# Patient Record
Sex: FEMALE | Race: WHITE | Employment: OTHER | URBAN - METROPOLITAN AREA
[De-identification: names, ages, dates, MRNs, and addresses within clinical notes are randomized per-mention and may not be internally consistent; named-entity substitution may affect disease eponyms.]

---

## 2018-07-20 ENCOUNTER — HOSPITAL ENCOUNTER (OUTPATIENT)
Age: 83
Setting detail: OBSERVATION
Discharge: HOME | End: 2018-07-21
Attending: EMERGENCY MEDICINE | Admitting: INTERNAL MEDICINE
Payer: MEDICARE

## 2018-07-20 ENCOUNTER — APPOINTMENT (OUTPATIENT)
Dept: GENERAL RADIOLOGY | Age: 83
End: 2018-07-20
Payer: MEDICARE

## 2018-07-20 ENCOUNTER — APPOINTMENT (OUTPATIENT)
Dept: CT IMAGING | Age: 83
End: 2018-07-20
Payer: MEDICARE

## 2018-07-20 DIAGNOSIS — S42.021A: ICD-10-CM

## 2018-07-20 DIAGNOSIS — M25.552 ACUTE HIP PAIN, LEFT: ICD-10-CM

## 2018-07-20 DIAGNOSIS — S09.90XA INJURY OF HEAD, INITIAL ENCOUNTER: ICD-10-CM

## 2018-07-20 DIAGNOSIS — R55 SYNCOPE AND COLLAPSE: Primary | ICD-10-CM

## 2018-07-20 LAB
ANION GAP SERPL CALCULATED.3IONS-SCNC: 14 MMOL/L (ref 7–16)
BASOPHILS ABSOLUTE: 0.06 E9/L (ref 0–0.2)
BASOPHILS RELATIVE PERCENT: 0.6 % (ref 0–2)
BUN BLDV-MCNC: 14 MG/DL (ref 8–23)
CALCIUM SERPL-MCNC: 9.4 MG/DL (ref 8.6–10.2)
CHLORIDE BLD-SCNC: 98 MMOL/L (ref 98–107)
CO2: 24 MMOL/L (ref 22–29)
CREAT SERPL-MCNC: 0.6 MG/DL (ref 0.5–1)
EOSINOPHILS ABSOLUTE: 0.08 E9/L (ref 0.05–0.5)
EOSINOPHILS RELATIVE PERCENT: 0.8 % (ref 0–6)
GFR AFRICAN AMERICAN: >60
GFR NON-AFRICAN AMERICAN: >60 ML/MIN/1.73
GLUCOSE BLD-MCNC: 122 MG/DL (ref 74–109)
HCT VFR BLD CALC: 44.8 % (ref 34–48)
HEMOGLOBIN: 14.9 G/DL (ref 11.5–15.5)
IMMATURE GRANULOCYTES #: 0.03 E9/L
IMMATURE GRANULOCYTES %: 0.3 % (ref 0–5)
LYMPHOCYTES ABSOLUTE: 1.58 E9/L (ref 1.5–4)
LYMPHOCYTES RELATIVE PERCENT: 15.1 % (ref 20–42)
MCH RBC QN AUTO: 29.7 PG (ref 26–35)
MCHC RBC AUTO-ENTMCNC: 33.3 % (ref 32–34.5)
MCV RBC AUTO: 89.4 FL (ref 80–99.9)
MONOCYTES ABSOLUTE: 0.61 E9/L (ref 0.1–0.95)
MONOCYTES RELATIVE PERCENT: 5.8 % (ref 2–12)
NEUTROPHILS ABSOLUTE: 8.13 E9/L (ref 1.8–7.3)
NEUTROPHILS RELATIVE PERCENT: 77.4 % (ref 43–80)
PDW BLD-RTO: 13.5 FL (ref 11.5–15)
PLATELET # BLD: 272 E9/L (ref 130–450)
PMV BLD AUTO: 8.9 FL (ref 7–12)
POTASSIUM SERPL-SCNC: 4.2 MMOL/L (ref 3.5–5)
RBC # BLD: 5.01 E12/L (ref 3.5–5.5)
SODIUM BLD-SCNC: 136 MMOL/L (ref 132–146)
TROPONIN: <0.01 NG/ML (ref 0–0.03)
WBC # BLD: 10.5 E9/L (ref 4.5–11.5)

## 2018-07-20 PROCEDURE — 96376 TX/PRO/DX INJ SAME DRUG ADON: CPT

## 2018-07-20 PROCEDURE — 99285 EMERGENCY DEPT VISIT HI MDM: CPT

## 2018-07-20 PROCEDURE — G0378 HOSPITAL OBSERVATION PER HR: HCPCS

## 2018-07-20 PROCEDURE — 73030 X-RAY EXAM OF SHOULDER: CPT

## 2018-07-20 PROCEDURE — 96374 THER/PROPH/DIAG INJ IV PUSH: CPT

## 2018-07-20 PROCEDURE — 85025 COMPLETE CBC W/AUTO DIFF WBC: CPT

## 2018-07-20 PROCEDURE — 70450 CT HEAD/BRAIN W/O DYE: CPT

## 2018-07-20 PROCEDURE — 84484 ASSAY OF TROPONIN QUANT: CPT

## 2018-07-20 PROCEDURE — 73502 X-RAY EXAM HIP UNI 2-3 VIEWS: CPT

## 2018-07-20 PROCEDURE — 80048 BASIC METABOLIC PNL TOTAL CA: CPT

## 2018-07-20 PROCEDURE — 6360000002 HC RX W HCPCS: Performed by: STUDENT IN AN ORGANIZED HEALTH CARE EDUCATION/TRAINING PROGRAM

## 2018-07-20 RX ORDER — LEVOTHYROXINE SODIUM 0.07 MG/1
75 TABLET ORAL DAILY
COMMUNITY

## 2018-07-20 RX ORDER — MORPHINE SULFATE 2 MG/ML
2 INJECTION, SOLUTION INTRAMUSCULAR; INTRAVENOUS ONCE
Status: COMPLETED | OUTPATIENT
Start: 2018-07-20 | End: 2018-07-20

## 2018-07-20 RX ORDER — HYDROCODONE BITARTRATE AND ACETAMINOPHEN 5; 325 MG/1; MG/1
1 TABLET ORAL 4 TIMES DAILY PRN
Status: ON HOLD | COMMUNITY
End: 2022-03-09 | Stop reason: HOSPADM

## 2018-07-20 RX ADMIN — MORPHINE SULFATE 2 MG: 2 INJECTION, SOLUTION INTRAMUSCULAR; INTRAVENOUS at 21:05

## 2018-07-20 RX ADMIN — MORPHINE SULFATE 2 MG: 2 INJECTION, SOLUTION INTRAMUSCULAR; INTRAVENOUS at 22:39

## 2018-07-20 ASSESSMENT — PAIN DESCRIPTION - LOCATION: LOCATION: NECK;SHOULDER;HIP

## 2018-07-20 ASSESSMENT — ENCOUNTER SYMPTOMS
SINUS PAIN: 0
SINUS PRESSURE: 0
ABDOMINAL PAIN: 0
APNEA: 0
EYE REDNESS: 0
EYE PAIN: 0
BOWEL INCONTINENCE: 0
CHOKING: 0
COUGH: 0
SHORTNESS OF BREATH: 0
BACK PAIN: 0
CHEST TIGHTNESS: 0
PHOTOPHOBIA: 0
ABDOMINAL DISTENTION: 0
FACIAL SWELLING: 0
NAUSEA: 1
VOMITING: 0
SORE THROAT: 0

## 2018-07-20 ASSESSMENT — PAIN DESCRIPTION - FREQUENCY
FREQUENCY: CONTINUOUS
FREQUENCY: CONTINUOUS

## 2018-07-20 ASSESSMENT — PAIN DESCRIPTION - ORIENTATION: ORIENTATION: RIGHT;LEFT

## 2018-07-20 ASSESSMENT — PAIN DESCRIPTION - DESCRIPTORS
DESCRIPTORS: ACHING;CONSTANT;DISCOMFORT;TENDER
DESCRIPTORS: ACHING

## 2018-07-20 ASSESSMENT — PAIN DESCRIPTION - PAIN TYPE
TYPE: ACUTE PAIN
TYPE: ACUTE PAIN

## 2018-07-20 ASSESSMENT — PAIN SCALES - GENERAL
PAINLEVEL_OUTOF10: 2
PAINLEVEL_OUTOF10: 4
PAINLEVEL_OUTOF10: 2
PAINLEVEL_OUTOF10: 3

## 2018-07-20 ASSESSMENT — PAIN DESCRIPTION - ONSET: ONSET: ON-GOING

## 2018-07-20 ASSESSMENT — PAIN DESCRIPTION - PROGRESSION: CLINICAL_PROGRESSION: NOT CHANGED

## 2018-07-21 VITALS
TEMPERATURE: 97.8 F | DIASTOLIC BLOOD PRESSURE: 90 MMHG | RESPIRATION RATE: 18 BRPM | WEIGHT: 145 LBS | BODY MASS INDEX: 25.69 KG/M2 | SYSTOLIC BLOOD PRESSURE: 180 MMHG | OXYGEN SATURATION: 93 % | HEART RATE: 68 BPM | HEIGHT: 63 IN

## 2018-07-21 LAB
T4 TOTAL: 7.1 MCG/DL (ref 4.5–11.7)
TSH SERPL DL<=0.05 MIU/L-ACNC: 2.1 UIU/ML (ref 0.27–4.2)

## 2018-07-21 PROCEDURE — 23500 CLTX CLAVICULAR FX W/O MNPJ: CPT | Performed by: ORTHOPAEDIC SURGERY

## 2018-07-21 PROCEDURE — 6370000000 HC RX 637 (ALT 250 FOR IP): Performed by: INTERNAL MEDICINE

## 2018-07-21 PROCEDURE — 84436 ASSAY OF TOTAL THYROXINE: CPT

## 2018-07-21 PROCEDURE — G0378 HOSPITAL OBSERVATION PER HR: HCPCS

## 2018-07-21 PROCEDURE — 84443 ASSAY THYROID STIM HORMONE: CPT

## 2018-07-21 PROCEDURE — 99202 OFFICE O/P NEW SF 15 MIN: CPT | Performed by: ORTHOPAEDIC SURGERY

## 2018-07-21 PROCEDURE — 96372 THER/PROPH/DIAG INJ SC/IM: CPT

## 2018-07-21 PROCEDURE — 36415 COLL VENOUS BLD VENIPUNCTURE: CPT

## 2018-07-21 PROCEDURE — 6360000002 HC RX W HCPCS: Performed by: INTERNAL MEDICINE

## 2018-07-21 PROCEDURE — 2580000003 HC RX 258: Performed by: INTERNAL MEDICINE

## 2018-07-21 RX ORDER — HYDROCODONE BITARTRATE AND ACETAMINOPHEN 5; 325 MG/1; MG/1
1 TABLET ORAL 4 TIMES DAILY PRN
Status: DISCONTINUED | OUTPATIENT
Start: 2018-07-21 | End: 2018-07-21 | Stop reason: HOSPADM

## 2018-07-21 RX ORDER — SODIUM CHLORIDE 0.9 % (FLUSH) 0.9 %
10 SYRINGE (ML) INJECTION PRN
Status: DISCONTINUED | OUTPATIENT
Start: 2018-07-21 | End: 2018-07-21 | Stop reason: HOSPADM

## 2018-07-21 RX ORDER — CHOLECALCIFEROL (VITAMIN D3) 50 MCG
10000 TABLET ORAL DAILY
Status: DISCONTINUED | OUTPATIENT
Start: 2018-07-21 | End: 2018-07-21 | Stop reason: HOSPADM

## 2018-07-21 RX ORDER — ACETAMINOPHEN 325 MG/1
650 TABLET ORAL EVERY 4 HOURS PRN
Status: DISCONTINUED | OUTPATIENT
Start: 2018-07-21 | End: 2018-07-21 | Stop reason: HOSPADM

## 2018-07-21 RX ORDER — LEVOTHYROXINE SODIUM 0.07 MG/1
75 TABLET ORAL DAILY
Status: DISCONTINUED | OUTPATIENT
Start: 2018-07-21 | End: 2018-07-21 | Stop reason: HOSPADM

## 2018-07-21 RX ORDER — SODIUM CHLORIDE 0.9 % (FLUSH) 0.9 %
10 SYRINGE (ML) INJECTION EVERY 12 HOURS SCHEDULED
Status: DISCONTINUED | OUTPATIENT
Start: 2018-07-21 | End: 2018-07-21 | Stop reason: HOSPADM

## 2018-07-21 RX ADMIN — CHOLECALCIFEROL TAB 50 MCG (2000 UNIT) 10000 UNITS: 50 TAB at 08:38

## 2018-07-21 RX ADMIN — ENOXAPARIN SODIUM 40 MG: 40 INJECTION, SOLUTION INTRAVENOUS; SUBCUTANEOUS at 08:38

## 2018-07-21 RX ADMIN — Medication 10 ML: at 08:38

## 2018-07-21 RX ADMIN — LEVOTHYROXINE SODIUM 75 MCG: 75 TABLET ORAL at 06:45

## 2018-07-21 RX ADMIN — HYDROCODONE BITARTRATE AND ACETAMINOPHEN 1 TABLET: 5; 325 TABLET ORAL at 08:37

## 2018-07-21 RX ADMIN — HYDROCODONE BITARTRATE AND ACETAMINOPHEN 1 TABLET: 5; 325 TABLET ORAL at 03:58

## 2018-07-21 ASSESSMENT — PAIN SCALES - GENERAL
PAINLEVEL_OUTOF10: 0
PAINLEVEL_OUTOF10: 5
PAINLEVEL_OUTOF10: 6
PAINLEVEL_OUTOF10: 2
PAINLEVEL_OUTOF10: 3

## 2018-07-21 ASSESSMENT — PAIN DESCRIPTION - LOCATION: LOCATION: HEAD;SHOULDER

## 2018-07-21 ASSESSMENT — PAIN DESCRIPTION - ONSET: ONSET: ON-GOING

## 2018-07-21 ASSESSMENT — PAIN DESCRIPTION - PROGRESSION: CLINICAL_PROGRESSION: GRADUALLY WORSENING

## 2018-07-21 ASSESSMENT — PAIN DESCRIPTION - FREQUENCY: FREQUENCY: CONTINUOUS

## 2018-07-21 ASSESSMENT — PAIN DESCRIPTION - DESCRIPTORS: DESCRIPTORS: HEADACHE;ACHING;DISCOMFORT;SORE

## 2018-07-21 ASSESSMENT — PAIN DESCRIPTION - PAIN TYPE: TYPE: ACUTE PAIN

## 2018-07-21 ASSESSMENT — PAIN DESCRIPTION - ORIENTATION: ORIENTATION: ANTERIOR;RIGHT

## 2018-07-21 NOTE — ED PROVIDER NOTES
non distended  Musc-Skel:  full range of motion, normal appearing, no deformities/edema/ ecchymosis  Skin:  warm and dry, normal turgor, no rashes   Neurologic: awake and alert, cooperative, Cranial Nerves II-XII grossly intact, motor& sensory grossly intact x4   Psychiatric: orientated x3, answers questions appropriately, judgment & affect grossly appropriate  Heme/ Lymph: no visible adenopathy, ecchymosis, pettichiae    ------------------------- NURSING NOTES AND VITALS REVIEWED ---------------------------  Date / Time Roomed:  7/20/2018  7:07 PM  ED Bed Assignment:  01/01    The nursing notes within the ED encounter and vital signs as below have been reviewed. Patient Vitals for the past 24 hrs:   BP Temp Temp src Pulse Resp SpO2 Height Weight   07/20/18 1804 (!) 145/77 98.2 °F (36.8 °C) Oral 71 16 92 % 5' 3\" (1.6 m) 141 lb (64 kg)       Oxygen Saturation Interpretation: Normal      Assessment and Plan: Will admit for syncope workup and observation      Impression:   1. Syncope and collapse    2. Injury of head, initial encounter    3. Fracture of clavicular shaft, closed, right, closed, initial encounter    4. Acute hip pain, left            I have reviewed the patient's medications, vital signs, and diagnostic studies available to me in the medical record at the time of the patient encounter.           Darby Gleason MD  08/27/18 0528
mmol/L    Glucose 122 (H) 74 - 109 mg/dL    BUN 14 8 - 23 mg/dL    CREATININE 0.6 0.5 - 1.0 mg/dL    GFR Non-African American >60 >=60 mL/min/1.73    GFR African American >60     Calcium 9.4 8.6 - 10.2 mg/dL   Troponin   Result Value Ref Range    Troponin <0.01 0.00 - 0.03 ng/mL   EKG 12 Lead   Result Value Ref Range    Ventricular Rate 73 BPM    Atrial Rate 73 BPM    P-R Interval 186 ms    QRS Duration 82 ms    Q-T Interval 380 ms    QTc Calculation (Bazett) 418 ms    P Axis 72 degrees    R Axis -26 degrees    T Axis 67 degrees       RADIOLOGY:  XR SHOULDER RIGHT (MIN 2 VIEWS)   Final Result   Fracture at the junction of the middle and lateral right clavicle with   minimal offset. Probable atelectasis in the lower right chest.      XR HIP LEFT (2-3 VIEWS)   Final Result   Degenerative changes in the left hip joint in mild to   moderate degree. No acute fractures or dislocations in the left hip. CT HEAD WO CONTRAST   Final Result          EKG Interpretation    Interpreted by emergency department physician    Rhythm: normal sinus   Rate: normal  Axis: normal  Ectopy: none  Conduction: normal  ST Segments: no acute change  T Waves: no acute change  Q Waves: none    Clinical Impression: no acute changes, normal EKG    Biju Noonan      ------------------------- NURSING NOTES AND VITALS REVIEWED ---------------------------  Date / Time Roomed:  7/20/2018  7:07 PM  ED Bed Assignment:  01/01    The nursing notes within the ED encounter and vital signs as below have been reviewed.      Patient Vitals for the past 24 hrs:   BP Temp Temp src Pulse Resp SpO2 Height Weight   07/20/18 1804 (!) 145/77 98.2 °F (36.8 °C) Oral 71 16 92 % 5' 3\" (1.6 m) 141 lb (64 kg)       Oxygen Saturation Interpretation: Normal    ------------------------------------------ PROGRESS NOTES ------------------------------------------  Re-evaluation(s):  Time: 2100  Patients symptoms are improving  Repeat physical examination is not

## 2018-07-21 NOTE — PROGRESS NOTES
[facility] Quality Flow/Interdisciplinary Rounds Progress Note        Quality Flow Rounds held on July 21, 2018    Disciplines Attending:  Bedside Nurse, ,  and Nursing Unit Leadership    Westley Lord was admitted on 7/20/2018  7:07 PM    Anticipated Discharge Date:  Expected Discharge Date: 07/22/18    Disposition:    Wilner Score:  Wilner Scale Score: 20    Readmission Risk              Risk of Unplanned Readmission:        8             Discussed patient goal for the day, patient clinical progression, and barriers to discharge. The following Goal(s) of the Day/Commitment(s) have been identified: discharge today.      Stefanie Lam  July 21, 2018

## 2018-07-21 NOTE — H&P
CHIEF COMPLAINT: fall        HISTORY OF PRESENT ILLNESS:      The patient is a 80 y.o. female patient with sudden onset of vertigo. She had this before. Awoke and when turned head to side she became dizzy. She has no chest pain. No vision issues. No stroke or central symptoms. She fell and broke collarbone. She ambulates fine some slight hip pain    Past Medical History:    Past Medical History:   Diagnosis Date    Thyroid disease        Past Surgical History:    Past Surgical History:   Procedure Laterality Date    HERNIA REPAIR         Medications Prior to Admission:    Prescriptions Prior to Admission: Cholecalciferol (VITAMIN D3) 24918 units CAPS, Take 10,000 Units by mouth daily  levothyroxine (SYNTHROID) 75 MCG tablet, Take 75 mcg by mouth Daily  HYDROcodone-acetaminophen (NORCO) 5-325 MG per tablet, Take 1 tablet by mouth 4 times daily as needed for Pain. .    Allergies:    Patient has no known allergies. Social History:    reports that she has quit smoking. She has never used smokeless tobacco. She reports that she does not drink alcohol or use drugs. Family History:   family history is not on file. REVIEW OF SYSTEMS:  As above in the HPI, otherwise negative    PHYSICAL EXAM:    Vitals:  BP (!) 155/74   Pulse 69   Temp 98.4 °F (36.9 °C) (Oral)   Resp 16   Ht 5' 3\" (1.6 m)   Wt 145 lb (65.8 kg)   SpO2 93%   BMI 25.69 kg/m²     General:  Awake, alert, oriented X 3. Well developed, well nourished, well groomed. No apparent distress. HEENT:  Normocephalic, atraumatic. Pupils equal, round, reactive to light. No scleral icterus. No conjunctival injection. Normal lips, teeth, and gums. No nasal discharge. Neck:  Supple  Heart:  RRR, no murmurs, gallops, rubs  Lungs:  CTA bilaterally, bilat symmetrical expansion, no wheeze, rales, or rhonchi  Abdomen:   Bowel sounds present, soft, nontender, no masses, no organomegaly, no peritoneal signs  Extremities:  No clubbing, cyanosis, or edema  Skin:  Warm and dry, no open lesions or rash  Neuro:  Cranial nerves 2-12 intact, no focal deficits  Breast: deferred  Rectal: deferred  Genitalia:  deferred    LABS:  CBC with Differential:  Lab Results   Component Value Date    WBC 10.5 07/20/2018    RBC 5.01 07/20/2018    HGB 14.9 07/20/2018    HCT 44.8 07/20/2018     07/20/2018    MCV 89.4 07/20/2018    MCH 29.7 07/20/2018    MCHC 33.3 07/20/2018    RDW 13.5 07/20/2018    LYMPHOPCT 15.1 07/20/2018    MONOPCT 5.8 07/20/2018    BASOPCT 0.6 07/20/2018    MONOSABS 0.61 07/20/2018    LYMPHSABS 1.58 07/20/2018    EOSABS 0.08 07/20/2018    BASOSABS 0.06 07/20/2018     CMP:  Lab Results   Component Value Date     07/20/2018    K 4.2 07/20/2018    CL 98 07/20/2018    CO2 24 07/20/2018    BUN 14 07/20/2018    CREATININE 0.6 07/20/2018    GFRAA >60 07/20/2018    LABGLOM >60 07/20/2018    GLUCOSE 122 07/20/2018    CALCIUM 9.4 07/20/2018     PT/INR:  No results found for: PROTIME, INR  @cktotal:3,ckmb:3,ckmbindex:3,troponini:3@  U/A:  No results found for: NITRU, COLORU, PHUR, LABCAST, WBCUA, RBCUA, MUCUS, TRICHOMONAS, YEAST, BACTERIA, CLARITYU, SPECGRAV, UROBILINOGEN, BILIRUBINUR, BLOODU, GLUCOSEU, KETUA, AMORPHOUS       ASSESSMENT:      Active Problems:    Near syncope  Resolved Problems:    * No resolved hospital problems.  *            PLAN:    Fall from benign postional vertigo  Now resolved  Broken clavicle and left hip pain  Ask ortho to address fracture home today    Aguilar Whitney DO  7:14 AM  7/21/2018

## 2018-07-26 LAB
EKG ATRIAL RATE: 73 BPM
EKG P AXIS: 72 DEGREES
EKG P-R INTERVAL: 186 MS
EKG Q-T INTERVAL: 380 MS
EKG QRS DURATION: 82 MS
EKG QTC CALCULATION (BAZETT): 418 MS
EKG R AXIS: -26 DEGREES
EKG T AXIS: 67 DEGREES
EKG VENTRICULAR RATE: 73 BPM

## 2018-08-07 ENCOUNTER — OFFICE VISIT (OUTPATIENT)
Dept: ORTHOPEDIC SURGERY | Age: 83
End: 2018-08-07

## 2018-08-07 VITALS
WEIGHT: 141 LBS | DIASTOLIC BLOOD PRESSURE: 78 MMHG | HEIGHT: 62 IN | HEART RATE: 84 BPM | BODY MASS INDEX: 25.95 KG/M2 | TEMPERATURE: 98.2 F | SYSTOLIC BLOOD PRESSURE: 138 MMHG

## 2018-08-07 DIAGNOSIS — M25.552 ACUTE HIP PAIN, LEFT: ICD-10-CM

## 2018-08-07 DIAGNOSIS — S42.024D CLOSED NONDISPLACED FRACTURE OF SHAFT OF RIGHT CLAVICLE WITH ROUTINE HEALING, SUBSEQUENT ENCOUNTER: Primary | ICD-10-CM

## 2018-08-07 DIAGNOSIS — G89.29 OTHER CHRONIC PAIN: ICD-10-CM

## 2018-08-07 PROCEDURE — 99024 POSTOP FOLLOW-UP VISIT: CPT | Performed by: ORTHOPAEDIC SURGERY

## 2018-09-27 ENCOUNTER — OFFICE VISIT (OUTPATIENT)
Dept: ORTHOPEDIC SURGERY | Age: 83
End: 2018-09-27

## 2018-09-27 VITALS
DIASTOLIC BLOOD PRESSURE: 80 MMHG | WEIGHT: 137 LBS | HEIGHT: 62 IN | BODY MASS INDEX: 25.21 KG/M2 | HEART RATE: 87 BPM | SYSTOLIC BLOOD PRESSURE: 134 MMHG | TEMPERATURE: 97.8 F

## 2018-09-27 DIAGNOSIS — S42.024D CLOSED NONDISPLACED FRACTURE OF SHAFT OF RIGHT CLAVICLE WITH ROUTINE HEALING, SUBSEQUENT ENCOUNTER: Primary | ICD-10-CM

## 2018-09-27 PROCEDURE — 99024 POSTOP FOLLOW-UP VISIT: CPT | Performed by: ORTHOPAEDIC SURGERY

## 2018-09-27 NOTE — PROGRESS NOTES
Enrique Mccloud  Is about 8 weeks after right midshaft clavicle fracture but steady clinical progress does not feel impaired by her right shoulder although she is having some discomfort especially at night, no numbness tingling in the hand or other joint complaints. Allergies; medications; past medical, surgical, family, and social history; and problem list have been reviewed today and updated as indicated in this encounter. Current Outpatient Prescriptions   Medication Sig Dispense Refill    Coenzyme Q10 (COQ10 PO) Take 1 capsule by mouth daily      B Complex Vitamins (VITAMIN B COMPLEX PO) Take 1 capsule by mouth daily      Cholecalciferol (VITAMIN D3) 16285 units CAPS Take 10,000 Units by mouth daily      levothyroxine (SYNTHROID) 75 MCG tablet Take 75 mcg by mouth Daily      HYDROcodone-acetaminophen (NORCO) 5-325 MG per tablet Take 1 tablet by mouth 4 times daily as needed for Pain. .       No current facility-administered medications for this visit. Exam: Right shoulder shows no visible or palpable deformity, the fracture site is palpable with mild tenderness but this appears to be stable with stress testing no crepitus. Glenohumeral joint shows good tolerance for passive and active rotation without pain mild subacromial crepitus. Radiographs: Two-view x-rays right clavicle today show the midshaft clavicle fracture remain well aligned with some early callus formation incomplete as yet. Teresa Meyersing was seen today for clavicle injury. Diagnoses and all orders for this visit:    Closed nondisplaced fracture of shaft of right clavicle with routine healing, subsequent encounter  -     XR Clavicle Right; Future     Patient continue with protected activities And she will follow-up here in 6 weeks for clinical and x-ray exams right clavicle. Return in about 6 weeks (around 11/8/2018) for xray.

## 2018-11-08 ENCOUNTER — OFFICE VISIT (OUTPATIENT)
Dept: ORTHOPEDIC SURGERY | Age: 83
End: 2018-11-08
Payer: MEDICARE

## 2018-11-08 VITALS
HEART RATE: 74 BPM | DIASTOLIC BLOOD PRESSURE: 90 MMHG | HEIGHT: 62 IN | SYSTOLIC BLOOD PRESSURE: 148 MMHG | TEMPERATURE: 96.9 F | WEIGHT: 137 LBS | BODY MASS INDEX: 25.21 KG/M2

## 2018-11-08 DIAGNOSIS — S42.024D CLOSED NONDISPLACED FRACTURE OF SHAFT OF RIGHT CLAVICLE WITH ROUTINE HEALING, SUBSEQUENT ENCOUNTER: Primary | ICD-10-CM

## 2018-11-08 PROCEDURE — 1101F PT FALLS ASSESS-DOCD LE1/YR: CPT | Performed by: ORTHOPAEDIC SURGERY

## 2018-11-08 PROCEDURE — 1090F PRES/ABSN URINE INCON ASSESS: CPT | Performed by: ORTHOPAEDIC SURGERY

## 2018-11-08 PROCEDURE — 99213 OFFICE O/P EST LOW 20 MIN: CPT | Performed by: ORTHOPAEDIC SURGERY

## 2018-11-08 PROCEDURE — G8427 DOCREV CUR MEDS BY ELIG CLIN: HCPCS | Performed by: ORTHOPAEDIC SURGERY

## 2018-11-08 PROCEDURE — 4040F PNEUMOC VAC/ADMIN/RCVD: CPT | Performed by: ORTHOPAEDIC SURGERY

## 2018-11-08 PROCEDURE — 1123F ACP DISCUSS/DSCN MKR DOCD: CPT | Performed by: ORTHOPAEDIC SURGERY

## 2018-11-08 PROCEDURE — G8484 FLU IMMUNIZE NO ADMIN: HCPCS | Performed by: ORTHOPAEDIC SURGERY

## 2018-11-08 PROCEDURE — G8419 CALC BMI OUT NRM PARAM NOF/U: HCPCS | Performed by: ORTHOPAEDIC SURGERY

## 2018-11-08 PROCEDURE — 1036F TOBACCO NON-USER: CPT | Performed by: ORTHOPAEDIC SURGERY

## 2018-11-08 RX ORDER — DEXTROAMPHETAMINE SACCHARATE, AMPHETAMINE ASPARTATE MONOHYDRATE, DEXTROAMPHETAMINE SULFATE AND AMPHETAMINE SULFATE 2.5; 2.5; 2.5; 2.5 MG/1; MG/1; MG/1; MG/1
CAPSULE, EXTENDED RELEASE ORAL
Refills: 0 | COMMUNITY
Start: 2018-09-28

## 2018-11-08 RX ORDER — LEVOTHYROXINE SODIUM 100 MCG
100 TABLET ORAL
Refills: 0 | COMMUNITY
Start: 2018-10-09

## 2018-11-14 ENCOUNTER — EVALUATION (OUTPATIENT)
Dept: PHYSICAL THERAPY | Age: 83
End: 2018-11-14
Payer: MEDICARE

## 2018-11-14 DIAGNOSIS — R55 NEAR SYNCOPE: ICD-10-CM

## 2018-11-14 PROCEDURE — G8984 CARRY CURRENT STATUS: HCPCS | Performed by: PHYSICAL THERAPIST

## 2018-11-14 PROCEDURE — 97110 THERAPEUTIC EXERCISES: CPT | Performed by: PHYSICAL THERAPIST

## 2018-11-14 PROCEDURE — G8985 CARRY GOAL STATUS: HCPCS | Performed by: PHYSICAL THERAPIST

## 2018-11-14 PROCEDURE — 97140 MANUAL THERAPY 1/> REGIONS: CPT | Performed by: PHYSICAL THERAPIST

## 2018-11-14 PROCEDURE — 97535 SELF CARE MNGMENT TRAINING: CPT | Performed by: PHYSICAL THERAPIST

## 2018-11-14 PROCEDURE — 97161 PT EVAL LOW COMPLEX 20 MIN: CPT | Performed by: PHYSICAL THERAPIST

## 2018-11-14 NOTE — PROGRESS NOTES
INITIAL EVALUATION:               Date:  2018   Patient: Lorie Jones  : 1931  MRN: [id number]  Physician: Rachel Mendez MD  [address] [place] [address]       Medical Diagnosis:   Y76.823G (ICD-10-CM) - Closed nondisplaced fracture of shaft of right clavicle with routine healing, subsequent encounter       Treatment Diagnosis: M25.611    Reason for referral/Mechanism of Injury: Patient states she became ill in the evening. She got up to walk and \"blacked out\" and fell. She then felt a cracking sensation. Onset date: 18     SUBJECTIVE:       Past Medical History:   Diagnosis Date    Thyroid disease      Past Surgical History:   Procedure Laterality Date    HERNIA REPAIR         Pain:  Current 0/10 Best  0/10  Worst 7/10    Symptom Type/Quality/Location/Behavior:  stiffness, intermittent pain R shoulder    Current ADL/Functional Status: Modified independence dressing, grooming. She is using her can in her R arm now. Modified independence sweeping/vacuuming. She is having a difficulty carrying things in the house. Unable to reach into cupboards for items and uses LUE right now. Unable to     Pre-morbid ADL/Functional Status:  Lives alone, ambulates with a SC. She normally is able to use RUE to reachin into cupboards, do housework independently. Occupation:Retired    ; Status:    Medical Management for Current Problem:  [] Chiro  []  CT:  []  Injection:   [x]  Meds: no longer taking anything for her shoulder  []  MRI:  [x]  Ortho  []  PCP  []  PT/OT  [x]  X-ray:  []  Surgery:  [x]  Other: heating pad at night. Outcome Measure: PT G-Codes  Functional Assessment Tool Used: QDASH  Score: 45%  Functional Limitation: Carrying, moving and handling objects  Carrying, Moving and Handling Objects Current Status (): At least 40 percent but less than 60 percent impaired, limited or restricted  Carrying, Moving and Handling Objects Goal Status ():  At least 20 exercise program    Method of Education: [x] Verbal  [x] Demo  [x] Written  Comprehension of Education:  [x] Verbalizes understanding. [] Demonstrates understanding. [x] Needs Review. [] Demonstrates/verbalizes understanding of HEP/Ed previously given. Frequency  3 x/week for 9 visits    Patient understands diagnosis/prognosis and consents to treatment, plan and goals: [x] Yes    [] No         Electronically signed by: Irma Miller, [address] Medicare Patients Only     Please sign Physician's Certification and return to: [address] [place] [address]  Dept: [phone]  Dept Fax: [phone] Certification / Comments     Frequency/Duration 3 / week for 9 visits. Certification period From: 18  To: 19    I have reviewed the Plan of Care established for skilled therapy services and certify that the services are required and that they will be provided while the patient is under my care.     Physician's Comments/Revisions:               Physician's Printed Name:                                           [de-identified] Signature:                                                               Date:

## 2018-11-19 ENCOUNTER — TREATMENT (OUTPATIENT)
Dept: PHYSICAL THERAPY | Age: 83
End: 2018-11-19
Payer: MEDICARE

## 2018-11-19 DIAGNOSIS — M25.611 DECREASED RANGE OF MOTION OF RIGHT SHOULDER: ICD-10-CM

## 2018-11-19 DIAGNOSIS — S42.024D: Primary | ICD-10-CM

## 2018-11-19 PROCEDURE — 97140 MANUAL THERAPY 1/> REGIONS: CPT | Performed by: PHYSICAL THERAPIST

## 2018-11-19 PROCEDURE — 97110 THERAPEUTIC EXERCISES: CPT | Performed by: PHYSICAL THERAPIST

## 2018-11-26 ENCOUNTER — TREATMENT (OUTPATIENT)
Dept: PHYSICAL THERAPY | Age: 83
End: 2018-11-26
Payer: MEDICARE

## 2018-11-26 DIAGNOSIS — S42.024D: Primary | ICD-10-CM

## 2018-11-26 DIAGNOSIS — M25.611 DECREASED RANGE OF MOTION OF RIGHT SHOULDER: ICD-10-CM

## 2018-11-26 DIAGNOSIS — R55 NEAR SYNCOPE: ICD-10-CM

## 2018-11-26 PROCEDURE — 97140 MANUAL THERAPY 1/> REGIONS: CPT

## 2018-11-26 PROCEDURE — 97110 THERAPEUTIC EXERCISES: CPT

## 2018-11-26 PROCEDURE — 97150 GROUP THERAPEUTIC PROCEDURES: CPT

## 2018-11-30 ENCOUNTER — TREATMENT (OUTPATIENT)
Dept: PHYSICAL THERAPY | Age: 83
End: 2018-11-30
Payer: MEDICARE

## 2018-11-30 DIAGNOSIS — M25.611 DECREASED RANGE OF MOTION OF RIGHT SHOULDER: ICD-10-CM

## 2018-11-30 DIAGNOSIS — R55 NEAR SYNCOPE: ICD-10-CM

## 2018-11-30 DIAGNOSIS — S42.024D: Primary | ICD-10-CM

## 2018-11-30 PROCEDURE — G8985 CARRY GOAL STATUS: HCPCS | Performed by: PHYSICAL THERAPIST

## 2018-11-30 PROCEDURE — 97140 MANUAL THERAPY 1/> REGIONS: CPT

## 2018-11-30 PROCEDURE — G8986 CARRY D/C STATUS: HCPCS | Performed by: PHYSICAL THERAPIST

## 2018-11-30 PROCEDURE — 97110 THERAPEUTIC EXERCISES: CPT

## 2019-09-15 ENCOUNTER — HOSPITAL ENCOUNTER (EMERGENCY)
Age: 84
Discharge: HOME | End: 2019-09-15
Attending: EMERGENCY MEDICINE
Payer: MEDICARE

## 2019-09-15 VITALS
HEIGHT: 61 IN | RESPIRATION RATE: 14 BRPM | HEART RATE: 82 BPM | SYSTOLIC BLOOD PRESSURE: 156 MMHG | TEMPERATURE: 98 F | BODY MASS INDEX: 26.24 KG/M2 | OXYGEN SATURATION: 93 % | WEIGHT: 139 LBS | DIASTOLIC BLOOD PRESSURE: 99 MMHG

## 2019-09-15 DIAGNOSIS — R53.83 FATIGUE, UNSPECIFIED TYPE: ICD-10-CM

## 2019-09-15 DIAGNOSIS — I10 ESSENTIAL HYPERTENSION: Primary | ICD-10-CM

## 2019-09-15 LAB
ALBUMIN SERPL-MCNC: 4.3 G/DL (ref 3.5–5.2)
ALP BLD-CCNC: 67 U/L (ref 35–104)
ALT SERPL-CCNC: 12 U/L (ref 0–32)
ANION GAP SERPL CALCULATED.3IONS-SCNC: 11 MMOL/L (ref 7–16)
AST SERPL-CCNC: 14 U/L (ref 0–31)
BACTERIA: ABNORMAL /HPF
BASOPHILS ABSOLUTE: 0.06 E9/L (ref 0–0.2)
BASOPHILS RELATIVE PERCENT: 0.8 % (ref 0–2)
BILIRUB SERPL-MCNC: 0.4 MG/DL (ref 0–1.2)
BILIRUBIN DIRECT: <0.2 MG/DL (ref 0–0.3)
BILIRUBIN URINE: NEGATIVE
BILIRUBIN, INDIRECT: NORMAL MG/DL (ref 0–1)
BLOOD, URINE: NEGATIVE
BUN BLDV-MCNC: 10 MG/DL (ref 8–23)
CALCIUM SERPL-MCNC: 9.1 MG/DL (ref 8.6–10.2)
CHLORIDE BLD-SCNC: 105 MMOL/L (ref 98–107)
CLARITY: CLEAR
CO2: 25 MMOL/L (ref 22–29)
COLOR: YELLOW
CREAT SERPL-MCNC: 0.6 MG/DL (ref 0.5–1)
EOSINOPHILS ABSOLUTE: 0.1 E9/L (ref 0.05–0.5)
EOSINOPHILS RELATIVE PERCENT: 1.4 % (ref 0–6)
GFR AFRICAN AMERICAN: >60
GFR NON-AFRICAN AMERICAN: >60 ML/MIN/1.73
GLUCOSE BLD-MCNC: 106 MG/DL (ref 74–99)
GLUCOSE URINE: NEGATIVE MG/DL
HCT VFR BLD CALC: 44.3 % (ref 34–48)
HEMOGLOBIN: 14.6 G/DL (ref 11.5–15.5)
IMMATURE GRANULOCYTES #: 0.03 E9/L
IMMATURE GRANULOCYTES %: 0.4 % (ref 0–5)
KETONES, URINE: NEGATIVE MG/DL
LEUKOCYTE ESTERASE, URINE: ABNORMAL
LIPASE: 24 U/L (ref 13–60)
LYMPHOCYTES ABSOLUTE: 1.27 E9/L (ref 1.5–4)
LYMPHOCYTES RELATIVE PERCENT: 17.7 % (ref 20–42)
MCH RBC QN AUTO: 29.4 PG (ref 26–35)
MCHC RBC AUTO-ENTMCNC: 33 % (ref 32–34.5)
MCV RBC AUTO: 89.1 FL (ref 80–99.9)
MONOCYTES ABSOLUTE: 0.55 E9/L (ref 0.1–0.95)
MONOCYTES RELATIVE PERCENT: 7.7 % (ref 2–12)
NEUTROPHILS ABSOLUTE: 5.17 E9/L (ref 1.8–7.3)
NEUTROPHILS RELATIVE PERCENT: 72 % (ref 43–80)
NITRITE, URINE: NEGATIVE
PDW BLD-RTO: 14.4 FL (ref 11.5–15)
PH UA: 7 (ref 5–9)
PLATELET # BLD: 257 E9/L (ref 130–450)
PMV BLD AUTO: 9 FL (ref 7–12)
POTASSIUM REFLEX MAGNESIUM: 3.8 MMOL/L (ref 3.5–5)
PROTEIN UA: NEGATIVE MG/DL
RBC # BLD: 4.97 E12/L (ref 3.5–5.5)
RBC UA: ABNORMAL /HPF (ref 0–2)
REASON FOR REJECTION: NORMAL
REJECTED TEST: NORMAL
SODIUM BLD-SCNC: 141 MMOL/L (ref 132–146)
SPECIFIC GRAVITY UA: 1.01 (ref 1–1.03)
TOTAL PROTEIN: 7.2 G/DL (ref 6.4–8.3)
UROBILINOGEN, URINE: 0.2 E.U./DL
WBC # BLD: 7.2 E9/L (ref 4.5–11.5)
WBC UA: ABNORMAL /HPF (ref 0–5)

## 2019-09-15 PROCEDURE — 36415 COLL VENOUS BLD VENIPUNCTURE: CPT

## 2019-09-15 PROCEDURE — 93005 ELECTROCARDIOGRAM TRACING: CPT | Performed by: EMERGENCY MEDICINE

## 2019-09-15 PROCEDURE — 85025 COMPLETE CBC W/AUTO DIFF WBC: CPT

## 2019-09-15 PROCEDURE — 80076 HEPATIC FUNCTION PANEL: CPT

## 2019-09-15 PROCEDURE — 99284 EMERGENCY DEPT VISIT MOD MDM: CPT

## 2019-09-15 PROCEDURE — 80048 BASIC METABOLIC PNL TOTAL CA: CPT

## 2019-09-15 PROCEDURE — 2580000003 HC RX 258: Performed by: EMERGENCY MEDICINE

## 2019-09-15 PROCEDURE — 83690 ASSAY OF LIPASE: CPT

## 2019-09-15 PROCEDURE — 81001 URINALYSIS AUTO W/SCOPE: CPT

## 2019-09-15 PROCEDURE — 6370000000 HC RX 637 (ALT 250 FOR IP): Performed by: EMERGENCY MEDICINE

## 2019-09-15 RX ORDER — ACETAMINOPHEN 500 MG
1000 TABLET ORAL ONCE
Status: COMPLETED | OUTPATIENT
Start: 2019-09-15 | End: 2019-09-15

## 2019-09-15 RX ORDER — 0.9 % SODIUM CHLORIDE 0.9 %
500 INTRAVENOUS SOLUTION INTRAVENOUS ONCE
Status: COMPLETED | OUTPATIENT
Start: 2019-09-15 | End: 2019-09-15

## 2019-09-15 RX ADMIN — ACETAMINOPHEN 1000 MG: 500 TABLET ORAL at 13:02

## 2019-09-15 RX ADMIN — SODIUM CHLORIDE 500 ML: 9 INJECTION, SOLUTION INTRAVENOUS at 13:05

## 2019-09-15 ASSESSMENT — ENCOUNTER SYMPTOMS
SHORTNESS OF BREATH: 0
DIARRHEA: 0
SORE THROAT: 0
ABDOMINAL PAIN: 0
TROUBLE SWALLOWING: 0
NAUSEA: 0
PHOTOPHOBIA: 0
WHEEZING: 0
COUGH: 0
VOMITING: 0
BLOOD IN STOOL: 0
ABDOMINAL DISTENTION: 0
SINUS PAIN: 0

## 2019-09-15 ASSESSMENT — PAIN DESCRIPTION - DESCRIPTORS
DESCRIPTORS: HEADACHE
DESCRIPTORS: HEADACHE

## 2019-09-15 ASSESSMENT — PAIN SCALES - GENERAL
PAINLEVEL_OUTOF10: 4
PAINLEVEL_OUTOF10: 2
PAINLEVEL_OUTOF10: 4

## 2019-09-15 ASSESSMENT — PAIN DESCRIPTION - LOCATION
LOCATION: HEAD
LOCATION: HEAD

## 2019-09-15 ASSESSMENT — PAIN DESCRIPTION - PAIN TYPE
TYPE: ACUTE PAIN
TYPE: ACUTE PAIN

## 2019-09-15 ASSESSMENT — PAIN DESCRIPTION - PROGRESSION: CLINICAL_PROGRESSION: GRADUALLY IMPROVING

## 2019-09-15 ASSESSMENT — PAIN DESCRIPTION - FREQUENCY
FREQUENCY: CONTINUOUS
FREQUENCY: CONTINUOUS

## 2019-09-16 LAB
EKG ATRIAL RATE: 69 BPM
EKG P AXIS: 65 DEGREES
EKG P-R INTERVAL: 192 MS
EKG Q-T INTERVAL: 386 MS
EKG QRS DURATION: 72 MS
EKG QTC CALCULATION (BAZETT): 413 MS
EKG R AXIS: -27 DEGREES
EKG T AXIS: 55 DEGREES
EKG VENTRICULAR RATE: 69 BPM

## 2019-09-16 PROCEDURE — 93010 ELECTROCARDIOGRAM REPORT: CPT | Performed by: INTERNAL MEDICINE

## 2019-12-08 ENCOUNTER — APPOINTMENT (OUTPATIENT)
Dept: NUCLEAR MEDICINE | Age: 84
End: 2019-12-08
Payer: MEDICARE

## 2019-12-08 ENCOUNTER — HOSPITAL ENCOUNTER (OUTPATIENT)
Age: 84
Setting detail: OBSERVATION
Discharge: HOME | End: 2019-12-08
Attending: EMERGENCY MEDICINE | Admitting: INTERNAL MEDICINE
Payer: MEDICARE

## 2019-12-08 ENCOUNTER — APPOINTMENT (OUTPATIENT)
Dept: GENERAL RADIOLOGY | Age: 84
End: 2019-12-08
Payer: MEDICARE

## 2019-12-08 VITALS
OXYGEN SATURATION: 93 % | WEIGHT: 137 LBS | RESPIRATION RATE: 18 BRPM | SYSTOLIC BLOOD PRESSURE: 131 MMHG | TEMPERATURE: 97.9 F | HEART RATE: 72 BPM | HEIGHT: 62 IN | BODY MASS INDEX: 25.21 KG/M2 | DIASTOLIC BLOOD PRESSURE: 61 MMHG

## 2019-12-08 DIAGNOSIS — R11.0 NAUSEA: ICD-10-CM

## 2019-12-08 DIAGNOSIS — R07.9 CHEST PAIN, UNSPECIFIED TYPE: Primary | ICD-10-CM

## 2019-12-08 PROBLEM — E78.5 HYPERLIPIDEMIA: Status: ACTIVE | Noted: 2019-12-08

## 2019-12-08 PROBLEM — I10 HYPERTENSION: Status: ACTIVE | Noted: 2019-12-08

## 2019-12-08 LAB
ALBUMIN SERPL-MCNC: 4 G/DL (ref 3.5–5.2)
ALP BLD-CCNC: 66 U/L (ref 35–104)
ALT SERPL-CCNC: 13 U/L (ref 0–32)
ANION GAP SERPL CALCULATED.3IONS-SCNC: 10 MMOL/L (ref 7–16)
AST SERPL-CCNC: 21 U/L (ref 0–31)
BACTERIA: ABNORMAL /HPF
BASOPHILS ABSOLUTE: 0.04 E9/L (ref 0–0.2)
BASOPHILS RELATIVE PERCENT: 0.6 % (ref 0–2)
BILIRUB SERPL-MCNC: 0.4 MG/DL (ref 0–1.2)
BILIRUBIN URINE: NEGATIVE
BLOOD, URINE: NEGATIVE
BUN BLDV-MCNC: 13 MG/DL (ref 8–23)
CALCIUM SERPL-MCNC: 9.1 MG/DL (ref 8.6–10.2)
CHLORIDE BLD-SCNC: 99 MMOL/L (ref 98–107)
CLARITY: CLEAR
CO2: 26 MMOL/L (ref 22–29)
COLOR: YELLOW
CREAT SERPL-MCNC: 0.6 MG/DL (ref 0.5–1)
EKG ATRIAL RATE: 71 BPM
EKG P AXIS: 89 DEGREES
EKG P-R INTERVAL: 190 MS
EKG Q-T INTERVAL: 374 MS
EKG QRS DURATION: 76 MS
EKG QTC CALCULATION (BAZETT): 406 MS
EKG R AXIS: 36 DEGREES
EKG T AXIS: 71 DEGREES
EKG VENTRICULAR RATE: 71 BPM
EOSINOPHILS ABSOLUTE: 0.1 E9/L (ref 0.05–0.5)
EOSINOPHILS RELATIVE PERCENT: 1.5 % (ref 0–6)
EPITHELIAL CELLS, UA: ABNORMAL /HPF
GFR AFRICAN AMERICAN: >60
GFR NON-AFRICAN AMERICAN: >60 ML/MIN/1.73
GLUCOSE BLD-MCNC: 119 MG/DL (ref 74–99)
GLUCOSE URINE: NEGATIVE MG/DL
HCT VFR BLD CALC: 42.4 % (ref 34–48)
HEMOGLOBIN: 13.4 G/DL (ref 11.5–15.5)
IMMATURE GRANULOCYTES #: 0.02 E9/L
IMMATURE GRANULOCYTES %: 0.3 % (ref 0–5)
KETONES, URINE: NEGATIVE MG/DL
LACTIC ACID: 1 MMOL/L (ref 0.5–2.2)
LEUKOCYTE ESTERASE, URINE: ABNORMAL
LIPASE: 29 U/L (ref 13–60)
LV EF: 82 %
LVEF MODALITY: NORMAL
LYMPHOCYTES ABSOLUTE: 1.28 E9/L (ref 1.5–4)
LYMPHOCYTES RELATIVE PERCENT: 18.7 % (ref 20–42)
MCH RBC QN AUTO: 28.6 PG (ref 26–35)
MCHC RBC AUTO-ENTMCNC: 31.6 % (ref 32–34.5)
MCV RBC AUTO: 90.4 FL (ref 80–99.9)
MONOCYTES ABSOLUTE: 0.51 E9/L (ref 0.1–0.95)
MONOCYTES RELATIVE PERCENT: 7.4 % (ref 2–12)
NEUTROPHILS ABSOLUTE: 4.9 E9/L (ref 1.8–7.3)
NEUTROPHILS RELATIVE PERCENT: 71.5 % (ref 43–80)
NITRITE, URINE: NEGATIVE
PDW BLD-RTO: 13.2 FL (ref 11.5–15)
PH UA: 5.5 (ref 5–9)
PLATELET # BLD: 258 E9/L (ref 130–450)
PMV BLD AUTO: 8.9 FL (ref 7–12)
POTASSIUM REFLEX MAGNESIUM: 4.4 MMOL/L (ref 3.5–5)
PROTEIN UA: NEGATIVE MG/DL
RBC # BLD: 4.69 E12/L (ref 3.5–5.5)
RBC UA: ABNORMAL /HPF (ref 0–2)
SODIUM BLD-SCNC: 135 MMOL/L (ref 132–146)
SPECIFIC GRAVITY UA: <=1.005 (ref 1–1.03)
TOTAL PROTEIN: 7 G/DL (ref 6.4–8.3)
TROPONIN: <0.01 NG/ML (ref 0–0.03)
TROPONIN: <0.01 NG/ML (ref 0–0.03)
UROBILINOGEN, URINE: 0.2 E.U./DL
WBC # BLD: 6.9 E9/L (ref 4.5–11.5)
WBC UA: ABNORMAL /HPF (ref 0–5)

## 2019-12-08 PROCEDURE — 84484 ASSAY OF TROPONIN QUANT: CPT

## 2019-12-08 PROCEDURE — 93016 CV STRESS TEST SUPVJ ONLY: CPT | Performed by: INTERNAL MEDICINE

## 2019-12-08 PROCEDURE — 81001 URINALYSIS AUTO W/SCOPE: CPT

## 2019-12-08 PROCEDURE — G0378 HOSPITAL OBSERVATION PER HR: HCPCS

## 2019-12-08 PROCEDURE — 93018 CV STRESS TEST I&R ONLY: CPT | Performed by: INTERNAL MEDICINE

## 2019-12-08 PROCEDURE — A9500 TC99M SESTAMIBI: HCPCS | Performed by: RADIOLOGY

## 2019-12-08 PROCEDURE — 93005 ELECTROCARDIOGRAM TRACING: CPT | Performed by: STUDENT IN AN ORGANIZED HEALTH CARE EDUCATION/TRAINING PROGRAM

## 2019-12-08 PROCEDURE — 93010 ELECTROCARDIOGRAM REPORT: CPT | Performed by: INTERNAL MEDICINE

## 2019-12-08 PROCEDURE — 80053 COMPREHEN METABOLIC PANEL: CPT

## 2019-12-08 PROCEDURE — 6360000002 HC RX W HCPCS: Performed by: STUDENT IN AN ORGANIZED HEALTH CARE EDUCATION/TRAINING PROGRAM

## 2019-12-08 PROCEDURE — 3430000000 HC RX DIAGNOSTIC RADIOPHARMACEUTICAL: Performed by: RADIOLOGY

## 2019-12-08 PROCEDURE — 96374 THER/PROPH/DIAG INJ IV PUSH: CPT

## 2019-12-08 PROCEDURE — 6370000000 HC RX 637 (ALT 250 FOR IP): Performed by: INTERNAL MEDICINE

## 2019-12-08 PROCEDURE — 99284 EMERGENCY DEPT VISIT MOD MDM: CPT

## 2019-12-08 PROCEDURE — 93017 CV STRESS TEST TRACING ONLY: CPT

## 2019-12-08 PROCEDURE — 71046 X-RAY EXAM CHEST 2 VIEWS: CPT

## 2019-12-08 PROCEDURE — 36415 COLL VENOUS BLD VENIPUNCTURE: CPT

## 2019-12-08 PROCEDURE — 83605 ASSAY OF LACTIC ACID: CPT

## 2019-12-08 PROCEDURE — 83690 ASSAY OF LIPASE: CPT

## 2019-12-08 PROCEDURE — 78452 HT MUSCLE IMAGE SPECT MULT: CPT

## 2019-12-08 PROCEDURE — 85025 COMPLETE CBC W/AUTO DIFF WBC: CPT

## 2019-12-08 RX ORDER — LEVOTHYROXINE SODIUM 0.1 MG/1
100 TABLET ORAL
Status: DISCONTINUED | OUTPATIENT
Start: 2019-12-14 | End: 2019-12-08 | Stop reason: HOSPADM

## 2019-12-08 RX ORDER — HYDROCODONE BITARTRATE AND ACETAMINOPHEN 5; 325 MG/1; MG/1
1 TABLET ORAL EVERY 6 HOURS PRN
Status: DISCONTINUED | OUTPATIENT
Start: 2019-12-08 | End: 2019-12-08 | Stop reason: HOSPADM

## 2019-12-08 RX ORDER — PANTOPRAZOLE SODIUM 40 MG/1
40 TABLET, DELAYED RELEASE ORAL
Qty: 30 TABLET | Refills: 0 | Status: SHIPPED | OUTPATIENT
Start: 2019-12-08

## 2019-12-08 RX ORDER — ASPIRIN 81 MG/1
81 TABLET, CHEWABLE ORAL DAILY
Status: DISCONTINUED | OUTPATIENT
Start: 2019-12-09 | End: 2019-12-08 | Stop reason: HOSPADM

## 2019-12-08 RX ORDER — ACETAMINOPHEN 325 MG/1
650 TABLET ORAL EVERY 4 HOURS PRN
Status: DISCONTINUED | OUTPATIENT
Start: 2019-12-08 | End: 2019-12-08 | Stop reason: HOSPADM

## 2019-12-08 RX ORDER — ONDANSETRON 2 MG/ML
4 INJECTION INTRAMUSCULAR; INTRAVENOUS EVERY 6 HOURS PRN
Status: DISCONTINUED | OUTPATIENT
Start: 2019-12-08 | End: 2019-12-08 | Stop reason: HOSPADM

## 2019-12-08 RX ORDER — ATORVASTATIN CALCIUM 40 MG/1
40 TABLET, FILM COATED ORAL NIGHTLY
Status: DISCONTINUED | OUTPATIENT
Start: 2019-12-08 | End: 2019-12-08 | Stop reason: HOSPADM

## 2019-12-08 RX ORDER — LEVOTHYROXINE SODIUM 0.03 MG/1
75 TABLET ORAL
Status: DISCONTINUED | OUTPATIENT
Start: 2019-12-09 | End: 2019-12-08 | Stop reason: HOSPADM

## 2019-12-08 RX ORDER — SODIUM CHLORIDE 0.9 % (FLUSH) 0.9 %
10 SYRINGE (ML) INJECTION EVERY 12 HOURS SCHEDULED
Status: DISCONTINUED | OUTPATIENT
Start: 2019-12-08 | End: 2019-12-08 | Stop reason: HOSPADM

## 2019-12-08 RX ORDER — ONDANSETRON 2 MG/ML
4 INJECTION INTRAMUSCULAR; INTRAVENOUS ONCE
Status: COMPLETED | OUTPATIENT
Start: 2019-12-08 | End: 2019-12-08

## 2019-12-08 RX ORDER — SODIUM CHLORIDE 0.9 % (FLUSH) 0.9 %
10 SYRINGE (ML) INJECTION PRN
Status: DISCONTINUED | OUTPATIENT
Start: 2019-12-08 | End: 2019-12-08 | Stop reason: HOSPADM

## 2019-12-08 RX ORDER — VALSARTAN 160 MG/1
160 TABLET ORAL DAILY
COMMUNITY

## 2019-12-08 RX ADMIN — ONDANSETRON 4 MG: 2 INJECTION INTRAMUSCULAR; INTRAVENOUS at 07:53

## 2019-12-08 RX ADMIN — Medication 10 MILLICURIE: at 12:05

## 2019-12-08 RX ADMIN — ACETAMINOPHEN 650 MG: 325 TABLET, FILM COATED ORAL at 14:01

## 2019-12-08 RX ADMIN — Medication 35 MILLICURIE: at 13:40

## 2019-12-08 ASSESSMENT — ENCOUNTER SYMPTOMS
PHOTOPHOBIA: 0
WHEEZING: 0
ABDOMINAL PAIN: 1
STRIDOR: 0
TROUBLE SWALLOWING: 0
SHORTNESS OF BREATH: 0
NAUSEA: 1
BACK PAIN: 0
SORE THROAT: 0
COUGH: 0

## 2019-12-08 ASSESSMENT — PAIN SCALES - GENERAL
PAINLEVEL_OUTOF10: 9
PAINLEVEL_OUTOF10: 0

## 2020-11-12 ENCOUNTER — APPOINTMENT (OUTPATIENT)
Dept: GENERAL RADIOLOGY | Age: 85
End: 2020-11-12
Payer: MEDICARE

## 2020-11-12 ENCOUNTER — HOSPITAL ENCOUNTER (EMERGENCY)
Age: 85
Discharge: HOME | End: 2020-11-12
Attending: EMERGENCY MEDICINE
Payer: MEDICARE

## 2020-11-12 ENCOUNTER — APPOINTMENT (OUTPATIENT)
Dept: CT IMAGING | Age: 85
End: 2020-11-12
Payer: MEDICARE

## 2020-11-12 VITALS
DIASTOLIC BLOOD PRESSURE: 69 MMHG | RESPIRATION RATE: 14 BRPM | WEIGHT: 130 LBS | HEART RATE: 80 BPM | BODY MASS INDEX: 23.92 KG/M2 | HEIGHT: 62 IN | SYSTOLIC BLOOD PRESSURE: 158 MMHG | TEMPERATURE: 98.3 F | OXYGEN SATURATION: 95 %

## 2020-11-12 LAB
ALBUMIN SERPL-MCNC: 4 G/DL (ref 3.5–5.2)
ALP BLD-CCNC: 54 U/L (ref 35–104)
ALT SERPL-CCNC: 13 U/L (ref 0–32)
ANION GAP SERPL CALCULATED.3IONS-SCNC: 11 MMOL/L (ref 7–16)
AST SERPL-CCNC: 30 U/L (ref 0–31)
BASOPHILS ABSOLUTE: 0.05 E9/L (ref 0–0.2)
BASOPHILS RELATIVE PERCENT: 0.7 % (ref 0–2)
BILIRUB SERPL-MCNC: 0.4 MG/DL (ref 0–1.2)
BILIRUBIN URINE: ABNORMAL
BLOOD, URINE: NEGATIVE
BUN BLDV-MCNC: 16 MG/DL (ref 8–23)
CALCIUM SERPL-MCNC: 9 MG/DL (ref 8.6–10.2)
CHLORIDE BLD-SCNC: 100 MMOL/L (ref 98–107)
CLARITY: CLEAR
CO2: 25 MMOL/L (ref 22–29)
COLOR: YELLOW
CREAT SERPL-MCNC: 0.6 MG/DL (ref 0.5–1)
EKG ATRIAL RATE: 65 BPM
EKG P AXIS: 25 DEGREES
EKG P-R INTERVAL: 166 MS
EKG Q-T INTERVAL: 404 MS
EKG QRS DURATION: 76 MS
EKG QTC CALCULATION (BAZETT): 420 MS
EKG R AXIS: -14 DEGREES
EKG T AXIS: 60 DEGREES
EKG VENTRICULAR RATE: 65 BPM
EOSINOPHILS ABSOLUTE: 0.05 E9/L (ref 0.05–0.5)
EOSINOPHILS RELATIVE PERCENT: 0.7 % (ref 0–6)
GFR AFRICAN AMERICAN: >60
GFR NON-AFRICAN AMERICAN: >60 ML/MIN/1.73
GLUCOSE BLD-MCNC: 81 MG/DL (ref 74–99)
GLUCOSE URINE: NEGATIVE MG/DL
HCT VFR BLD CALC: 41.1 % (ref 34–48)
HEMOGLOBIN: 13 G/DL (ref 11.5–15.5)
IMMATURE GRANULOCYTES #: 0.03 E9/L
IMMATURE GRANULOCYTES %: 0.4 % (ref 0–5)
KETONES, URINE: 40 MG/DL
LEUKOCYTE ESTERASE, URINE: NEGATIVE
LYMPHOCYTES ABSOLUTE: 1.03 E9/L (ref 1.5–4)
LYMPHOCYTES RELATIVE PERCENT: 14.6 % (ref 20–42)
MCH RBC QN AUTO: 28.8 PG (ref 26–35)
MCHC RBC AUTO-ENTMCNC: 31.6 % (ref 32–34.5)
MCV RBC AUTO: 91.1 FL (ref 80–99.9)
MONOCYTES ABSOLUTE: 0.46 E9/L (ref 0.1–0.95)
MONOCYTES RELATIVE PERCENT: 6.5 % (ref 2–12)
NEUTROPHILS ABSOLUTE: 5.43 E9/L (ref 1.8–7.3)
NEUTROPHILS RELATIVE PERCENT: 77.1 % (ref 43–80)
NITRITE, URINE: NEGATIVE
PDW BLD-RTO: 13.3 FL (ref 11.5–15)
PH UA: 5.5 (ref 5–9)
PLATELET # BLD: 264 E9/L (ref 130–450)
PMV BLD AUTO: 8.9 FL (ref 7–12)
POTASSIUM REFLEX MAGNESIUM: 5.3 MMOL/L (ref 3.5–5)
PROTEIN UA: NEGATIVE MG/DL
RBC # BLD: 4.51 E12/L (ref 3.5–5.5)
SARS-COV-2, NAAT: NOT DETECTED
SODIUM BLD-SCNC: 136 MMOL/L (ref 132–146)
SPECIFIC GRAVITY UA: 1.02 (ref 1–1.03)
TOTAL PROTEIN: 7.1 G/DL (ref 6.4–8.3)
TROPONIN: <0.01 NG/ML (ref 0–0.03)
UROBILINOGEN, URINE: 0.2 E.U./DL
WBC # BLD: 7.1 E9/L (ref 4.5–11.5)

## 2020-11-12 PROCEDURE — 71045 X-RAY EXAM CHEST 1 VIEW: CPT

## 2020-11-12 PROCEDURE — 71260 CT THORAX DX C+: CPT

## 2020-11-12 PROCEDURE — 93010 ELECTROCARDIOGRAM REPORT: CPT | Performed by: INTERNAL MEDICINE

## 2020-11-12 PROCEDURE — 93005 ELECTROCARDIOGRAM TRACING: CPT | Performed by: STUDENT IN AN ORGANIZED HEALTH CARE EDUCATION/TRAINING PROGRAM

## 2020-11-12 PROCEDURE — U0002 COVID-19 LAB TEST NON-CDC: HCPCS

## 2020-11-12 PROCEDURE — 6360000004 HC RX CONTRAST MEDICATION: Performed by: RADIOLOGY

## 2020-11-12 PROCEDURE — 99284 EMERGENCY DEPT VISIT MOD MDM: CPT

## 2020-11-12 PROCEDURE — 80053 COMPREHEN METABOLIC PANEL: CPT

## 2020-11-12 PROCEDURE — 84484 ASSAY OF TROPONIN QUANT: CPT

## 2020-11-12 PROCEDURE — 85025 COMPLETE CBC W/AUTO DIFF WBC: CPT

## 2020-11-12 PROCEDURE — 81003 URINALYSIS AUTO W/O SCOPE: CPT

## 2020-11-12 PROCEDURE — 70450 CT HEAD/BRAIN W/O DYE: CPT

## 2020-11-12 RX ORDER — ALBUTEROL SULFATE 90 UG/1
2 AEROSOL, METERED RESPIRATORY (INHALATION) 4 TIMES DAILY PRN
Qty: 1 INHALER | Refills: 0 | Status: SHIPPED | OUTPATIENT
Start: 2020-11-12

## 2020-11-12 RX ORDER — DOXYCYCLINE HYCLATE 100 MG
100 TABLET ORAL 2 TIMES DAILY
Qty: 14 TABLET | Refills: 0 | Status: SHIPPED | OUTPATIENT
Start: 2020-11-12 | End: 2020-11-19

## 2020-11-12 RX ADMIN — IOPAMIDOL 75 ML: 755 INJECTION, SOLUTION INTRAVENOUS at 11:27

## 2020-11-12 ASSESSMENT — ENCOUNTER SYMPTOMS
TROUBLE SWALLOWING: 0
EYE PAIN: 0
COUGH: 0
PHOTOPHOBIA: 0
DIARRHEA: 0
CONSTIPATION: 0
SORE THROAT: 0
ABDOMINAL DISTENTION: 0
SHORTNESS OF BREATH: 0
ABDOMINAL PAIN: 0
VOMITING: 0
NAUSEA: 0

## 2020-11-12 ASSESSMENT — PAIN SCALES - GENERAL: PAINLEVEL_OUTOF10: 6

## 2020-11-12 ASSESSMENT — PAIN DESCRIPTION - LOCATION: LOCATION: HEAD

## 2020-11-12 ASSESSMENT — PAIN DESCRIPTION - PAIN TYPE: TYPE: ACUTE PAIN

## 2020-11-12 ASSESSMENT — PAIN DESCRIPTION - DESCRIPTORS: DESCRIPTORS: THROBBING

## 2020-11-12 NOTE — ED NOTES
Pt ambulated without difficulty, SpO2 95% during ambulation, resident notified     Nikki Zamorano RN  11/12/20 22 [phone]

## 2020-11-12 NOTE — ED PROVIDER NOTES
Patient is a 22-year-old female with a past medical history of hypothyroidism, hypertension, hyperlipidemia, arthritis who presented to the ED via ambulance for generalized weakness. She reports it has been going on for the past week. She had recently seen her primary physician who had switched her antihypertensive medication and is currently being treated for thrush. She reports feeling of upset stomach with the new hypertensive medication and has not been taking it. Vitals were within normal limit for the ambulance personnel except blood pressure 190/95. Reports having headache and suppressed appetite. Patient denies any visual disturbances, chest pain, shortness of breath, abdominal pain, dysuria, hematuria, diarrhea, lower leg swelling, or dizziness. Review of Systems   Constitutional: Negative for activity change, appetite change, chills, fatigue and fever. HENT: Negative for congestion, sore throat and trouble swallowing. Eyes: Negative for photophobia, pain and visual disturbance. Respiratory: Negative for cough and shortness of breath. Cardiovascular: Negative for chest pain, palpitations and leg swelling. Gastrointestinal: Negative for abdominal distention, abdominal pain, constipation, diarrhea, nausea and vomiting. Endocrine: Negative for heat intolerance and polydipsia. Genitourinary: Negative for difficulty urinating, dysuria, frequency and hematuria. Musculoskeletal: Negative for joint swelling, neck pain and neck stiffness. Skin: Negative for rash and wound. Neurological: Negative for dizziness, syncope, weakness, light-headedness, numbness and headaches. Psychiatric/Behavioral: Negative for agitation, behavioral problems and confusion. The patient is not nervous/anxious. Physical Exam  Constitutional:       General: She is not in acute distress. Appearance: Normal appearance. She is not ill-appearing.    HENT:      Head: Normocephalic and were unremarkable. Troponin negative. Urinalysis showed small bilirubin and ketones but otherwise unremarkable. COVID-19 negative. CT head negative for any acute intracranial abnormality. Atrophy and periventricular leukomalacia. Chest x-ray showed right lung base infiltrate. CT chest with contrast showed Small infiltrate in the right lower lobe. Significant chronic elevation of right hemidiaphragm favoring adjacent atelectasis. Large hiatal hernia. She ambulated on room air at 97% with no difficulty. Patient will be sent home on antibiotic for 7 weeks and Albuterol inhaler. Vies patient to take Tylenol for headache. Likely secondary to uncontrolled blood pressure. Patient resistant to taking BP medications at home. Will need to follow-up with PCP outpatient.                     --------------------------------------------- PAST HISTORY ---------------------------------------------  Past Medical History:  has a past medical history of Hyperlipidemia, Hypertension, Osteoporosis, Spinal stenosis, and Thyroid disease. Past Surgical History:  has a past surgical history that includes hernia repair. Social History:  reports that she has quit smoking. She has never used smokeless tobacco. She reports that she does not drink alcohol or use drugs. Family History: family history is not on file. The patients home medications have been reviewed. Allergies: Patient has no known allergies.     -------------------------------------------------- RESULTS -------------------------------------------------  Labs:  Results for orders placed or performed during the hospital encounter of 11/12/20   CBC Auto Differential   Result Value Ref Range    WBC 7.1 4.5 - 11.5 E9/L    RBC 4.51 3.50 - 5.50 E12/L    Hemoglobin 13.0 11.5 - 15.5 g/dL    Hematocrit 41.1 34.0 - 48.0 %    MCV 91.1 80.0 - 99.9 fL    MCH 28.8 26.0 - 35.0 pg    MCHC 31.6 (L) 32.0 - 34.5 %    RDW 13.3 11.5 - 15.0 fL    Platelets 078 587 - 667 QTc Calculation (Bazett) 420 ms    P Axis 25 degrees    R Axis -14 degrees    T Axis 60 degrees       Radiology:  CT CHEST W CONTRAST   Final Result   1. Small infiltrate seen within the right lower lobe best appreciated on the   sagittal images. 2. Significant chronic elevation of the right hemidiaphragm. There is   adjacent alveolar consolidation which could represent either an infiltrate or   adjacent compression atelectasis. I would favor adjacent compression   atelectasis. 3. Large hiatal hernia possibly a paraesophageal or Bochdalek hernia. There   is minimal adjacent atelectasis seen within the medial left lung base. CT Head WO Contrast   Final Result   1. There is no acute intracranial abnormality. Specifically, there is no   intracranial hemorrhage. 2. Atrophy and periventricular leukomalacia,         XR CHEST PORTABLE   Final Result   Right lung base infiltrate      Chronic elevation right hemidiaphragm      Large hiatal hernia             ------------------------- NURSING NOTES AND VITALS REVIEWED ---------------------------  Date / Time Roomed:  11/12/2020  7:53 AM  ED Bed Assignment:  15/15    The nursing notes within the ED encounter and vital signs as below have been reviewed. BP (!) 158/69   Pulse 80   Temp 98.3 °F (36.8 °C) (Oral)   Resp 14   Ht 5' 2\" (1.575 m)   Wt 130 lb (59 kg)   SpO2 95%   BMI 23.78 kg/m²   Oxygen Saturation Interpretation: Normal      ------------------------------------------ PROGRESS NOTES ------------------------------------------  12:59 PM EST  I have spoken with the patient and discussed todays results, in addition to providing specific details for the plan of care and counseling regarding the diagnosis and prognosis. Their questions are answered at this time and they are agreeable with the plan. I discussed at length with them reasons for immediate return here for re evaluation.  They will followup with their primary care physician by calling their office tomorrow. --------------------------------- ADDITIONAL PROVIDER NOTES ---------------------------------  At this time the patient is without objective evidence of an acute process requiring hospitalization or inpatient management. They have remained hemodynamically stable throughout their entire ED visit and are stable for discharge with outpatient follow-up. The plan has been discussed in detail and they are aware of the specific conditions for emergent return, as well as the importance of follow-up. New Prescriptions    ALBUTEROL SULFATE HFA (VENTOLIN HFA) 108 (90 BASE) MCG/ACT INHALER    Inhale 2 puffs into the lungs 4 times daily as needed for Wheezing    DOXYCYCLINE HYCLATE (VIBRA-TABS) 100 MG TABLET    Take 1 tablet by mouth 2 times daily for 7 days       Diagnosis:  1. Bronchitis    2. Chronic nonintractable headache, unspecified headache type        Disposition:  Patient's disposition: Discharge to home  Patient's condition is stable.          Mary Ellen Sims MD  Resident  11/12/20 [id number]

## 2020-11-12 NOTE — ED NOTES
Bed: 15  Expected date:   Expected time:   Means of arrival:   Comments:  Carlos Alberto Phipps RN  11/12/20 [id number]

## 2020-11-14 ENCOUNTER — APPOINTMENT (OUTPATIENT)
Dept: CT IMAGING | Age: 85
End: 2020-11-14
Payer: MEDICARE

## 2020-11-14 ENCOUNTER — HOSPITAL ENCOUNTER (EMERGENCY)
Age: 85
Discharge: HOME | End: 2020-11-14
Attending: EMERGENCY MEDICINE
Payer: MEDICARE

## 2020-11-14 VITALS
HEART RATE: 72 BPM | OXYGEN SATURATION: 93 % | TEMPERATURE: 98.1 F | RESPIRATION RATE: 16 BRPM | WEIGHT: 130 LBS | SYSTOLIC BLOOD PRESSURE: 164 MMHG | DIASTOLIC BLOOD PRESSURE: 83 MMHG | BODY MASS INDEX: 23.92 KG/M2 | HEIGHT: 62 IN

## 2020-11-14 LAB
ALBUMIN SERPL-MCNC: 3.9 G/DL (ref 3.5–5.2)
ALP BLD-CCNC: 58 U/L (ref 35–104)
ALT SERPL-CCNC: 12 U/L (ref 0–32)
ANION GAP SERPL CALCULATED.3IONS-SCNC: 12 MMOL/L (ref 7–16)
AST SERPL-CCNC: 18 U/L (ref 0–31)
BACTERIA: ABNORMAL /HPF
BASOPHILS ABSOLUTE: 0.05 E9/L (ref 0–0.2)
BASOPHILS RELATIVE PERCENT: 0.8 % (ref 0–2)
BILIRUB SERPL-MCNC: 0.3 MG/DL (ref 0–1.2)
BILIRUBIN URINE: NEGATIVE
BLOOD, URINE: NEGATIVE
BUN BLDV-MCNC: 11 MG/DL (ref 8–23)
CALCIUM SERPL-MCNC: 8.9 MG/DL (ref 8.6–10.2)
CHLORIDE BLD-SCNC: 100 MMOL/L (ref 98–107)
CLARITY: CLEAR
CO2: 24 MMOL/L (ref 22–29)
COLOR: YELLOW
CREAT SERPL-MCNC: 0.6 MG/DL (ref 0.5–1)
EOSINOPHILS ABSOLUTE: 0.08 E9/L (ref 0.05–0.5)
EOSINOPHILS RELATIVE PERCENT: 1.3 % (ref 0–6)
EPITHELIAL CELLS, UA: ABNORMAL /HPF
GFR AFRICAN AMERICAN: >60
GFR NON-AFRICAN AMERICAN: >60 ML/MIN/1.73
GLUCOSE BLD-MCNC: 80 MG/DL (ref 74–99)
GLUCOSE URINE: NEGATIVE MG/DL
HCT VFR BLD CALC: 42 % (ref 34–48)
HEMOGLOBIN: 13.2 G/DL (ref 11.5–15.5)
IMMATURE GRANULOCYTES #: 0.02 E9/L
IMMATURE GRANULOCYTES %: 0.3 % (ref 0–5)
KETONES, URINE: 15 MG/DL
LACTIC ACID: 0.7 MMOL/L (ref 0.5–2.2)
LEUKOCYTE ESTERASE, URINE: NEGATIVE
LIPASE: 30 U/L (ref 13–60)
LYMPHOCYTES ABSOLUTE: 1.02 E9/L (ref 1.5–4)
LYMPHOCYTES RELATIVE PERCENT: 16.5 % (ref 20–42)
MCH RBC QN AUTO: 28.7 PG (ref 26–35)
MCHC RBC AUTO-ENTMCNC: 31.4 % (ref 32–34.5)
MCV RBC AUTO: 91.3 FL (ref 80–99.9)
METER GLUCOSE: 171 MG/DL (ref 74–99)
MONOCYTES ABSOLUTE: 0.55 E9/L (ref 0.1–0.95)
MONOCYTES RELATIVE PERCENT: 8.9 % (ref 2–12)
NEUTROPHILS ABSOLUTE: 4.47 E9/L (ref 1.8–7.3)
NEUTROPHILS RELATIVE PERCENT: 72.2 % (ref 43–80)
NITRITE, URINE: NEGATIVE
PDW BLD-RTO: 13.3 FL (ref 11.5–15)
PH UA: 5.5 (ref 5–9)
PLATELET # BLD: 286 E9/L (ref 130–450)
PMV BLD AUTO: 8.9 FL (ref 7–12)
POTASSIUM REFLEX MAGNESIUM: 4.1 MMOL/L (ref 3.5–5)
PROTEIN UA: NEGATIVE MG/DL
RBC # BLD: 4.6 E12/L (ref 3.5–5.5)
RBC UA: ABNORMAL /HPF (ref 0–2)
SODIUM BLD-SCNC: 136 MMOL/L (ref 132–146)
SPECIFIC GRAVITY UA: 1.01 (ref 1–1.03)
TOTAL PROTEIN: 6.9 G/DL (ref 6.4–8.3)
TROPONIN: <0.01 NG/ML (ref 0–0.03)
UROBILINOGEN, URINE: 0.2 E.U./DL
WBC # BLD: 6.2 E9/L (ref 4.5–11.5)
WBC UA: ABNORMAL /HPF (ref 0–5)

## 2020-11-14 PROCEDURE — 96375 TX/PRO/DX INJ NEW DRUG ADDON: CPT

## 2020-11-14 PROCEDURE — 87088 URINE BACTERIA CULTURE: CPT

## 2020-11-14 PROCEDURE — 36415 COLL VENOUS BLD VENIPUNCTURE: CPT

## 2020-11-14 PROCEDURE — 6360000004 HC RX CONTRAST MEDICATION: Performed by: RADIOLOGY

## 2020-11-14 PROCEDURE — 83605 ASSAY OF LACTIC ACID: CPT

## 2020-11-14 PROCEDURE — 6360000002 HC RX W HCPCS: Performed by: EMERGENCY MEDICINE

## 2020-11-14 PROCEDURE — 99283 EMERGENCY DEPT VISIT LOW MDM: CPT

## 2020-11-14 PROCEDURE — 82962 GLUCOSE BLOOD TEST: CPT

## 2020-11-14 PROCEDURE — 80053 COMPREHEN METABOLIC PANEL: CPT

## 2020-11-14 PROCEDURE — 96374 THER/PROPH/DIAG INJ IV PUSH: CPT

## 2020-11-14 PROCEDURE — 85025 COMPLETE CBC W/AUTO DIFF WBC: CPT

## 2020-11-14 PROCEDURE — 84484 ASSAY OF TROPONIN QUANT: CPT

## 2020-11-14 PROCEDURE — 81001 URINALYSIS AUTO W/SCOPE: CPT

## 2020-11-14 PROCEDURE — 83690 ASSAY OF LIPASE: CPT

## 2020-11-14 PROCEDURE — 74177 CT ABD & PELVIS W/CONTRAST: CPT

## 2020-11-14 PROCEDURE — 6360000002 HC RX W HCPCS: Performed by: PHYSICIAN ASSISTANT

## 2020-11-14 RX ORDER — ONDANSETRON 2 MG/ML
4 INJECTION INTRAMUSCULAR; INTRAVENOUS ONCE
Status: COMPLETED | OUTPATIENT
Start: 2020-11-14 | End: 2020-11-14

## 2020-11-14 RX ORDER — GUAIFENESIN 400 MG/1
400 TABLET ORAL 4 TIMES DAILY PRN
Status: DISCONTINUED | OUTPATIENT
Start: 2020-11-14 | End: 2020-11-14 | Stop reason: HOSPADM

## 2020-11-14 RX ORDER — ONDANSETRON 4 MG/1
4 TABLET, FILM COATED ORAL 3 TIMES DAILY PRN
Qty: 15 TABLET | Refills: 0 | Status: SHIPPED | OUTPATIENT
Start: 2020-11-14

## 2020-11-14 RX ORDER — KETOROLAC TROMETHAMINE 30 MG/ML
15 INJECTION, SOLUTION INTRAMUSCULAR; INTRAVENOUS ONCE
Status: COMPLETED | OUTPATIENT
Start: 2020-11-14 | End: 2020-11-14

## 2020-11-14 RX ADMIN — IOPAMIDOL 75 ML: 755 INJECTION, SOLUTION INTRAVENOUS at 15:30

## 2020-11-14 RX ADMIN — ONDANSETRON 4 MG: 2 INJECTION INTRAMUSCULAR; INTRAVENOUS at 14:51

## 2020-11-14 RX ADMIN — KETOROLAC TROMETHAMINE 15 MG: 30 INJECTION, SOLUTION INTRAMUSCULAR at 16:14

## 2020-11-14 ASSESSMENT — PAIN DESCRIPTION - DESCRIPTORS: DESCRIPTORS: CRAMPING;ACHING;SHARP

## 2020-11-14 ASSESSMENT — ENCOUNTER SYMPTOMS
VOMITING: 1
DIARRHEA: 0
SHORTNESS OF BREATH: 0
NAUSEA: 1
CHEST TIGHTNESS: 0
WHEEZING: 0
COUGH: 0
EYE PAIN: 0
BACK PAIN: 0
ABDOMINAL PAIN: 1
RHINORRHEA: 1
TROUBLE SWALLOWING: 0
BLOOD IN STOOL: 0

## 2020-11-14 ASSESSMENT — PAIN DESCRIPTION - LOCATION: LOCATION: ABDOMEN

## 2020-11-14 ASSESSMENT — PAIN SCALES - GENERAL
PAINLEVEL_OUTOF10: 5
PAINLEVEL_OUTOF10: 5

## 2020-11-14 ASSESSMENT — PAIN DESCRIPTION - PAIN TYPE: TYPE: ACUTE PAIN

## 2020-11-14 ASSESSMENT — PAIN DESCRIPTION - FREQUENCY: FREQUENCY: INTERMITTENT

## 2020-11-14 NOTE — ED NOTES
FIRST PROVIDER CONTACT ASSESSMENT NOTE      Department of Emergency Medicine   ED  First Provider Note   11/14/20  1:37 PM EST    Chief Complaint: Dizziness (Was seen here 2 days ago, not getting better, got script but has not taken them yet) and Abdominal Pain      History of Present Illness:    Chemo Uribe is a 80 y.o. female who presents to the ED by private car for this, nausea, vomiting and diarrhea. Patient was seen previously over the last 2 days and was diagnosed with bronchitis. She was given doxycycline and adjust started the antibiotic last night. Patient's son is present to help give history. He states that she is waking up with severe abdominal pain and clutching it. He is concerned about her abdomen and his scan was not done the last time she was in the emergency department. Focused Screening Exam:  Constitutional:  Alert, appears stated age and is in no distress. *ALLERGIES*     Patient has no known allergies.      ED Triage Vitals [11/14/20 1334]   BP Temp Temp Source Pulse Resp SpO2 Height Weight   (!) 164/83 98.1 °F (36.7 °C) Temporal 72 16 93 % 5' 2\" (1.575 m) 130 lb (59 kg)        Initial Plan of Care:  Initiate Treatment-Testing, Proceed toTreatment Area When Bed Available for ED Attending/MLP to Continue Care    -----------------END OF FIRST PROVIDER CONTACT ASSESSMENT NOTE--------------  Electronically signed by Rachna Elder PA-C   DD: 11/14/20       Rachna Elder PA-C  11/14/20 [phone]

## 2020-11-14 NOTE — ED PROVIDER NOTES
[facility]  eMERGENCY dEPARTMENT eNCOUnter      Pt Name: Prabhjot Mirza  MRN: [id number]  [place] 12/19/1931  Date of evaluation: 11/14/2020  Provider: Dolores Carver MD     CHIEF COMPLAINT       Chief Complaint   Patient presents with    Dizziness     Was seen here 2 days ago, not getting better, got script but has not taken them yet    Abdominal Pain         HISTORY OF PRESENT ILLNESS   (Location/Symptom, Timing/Onset,Context/Setting, Quality, Duration, Modifying Factors, Severity) Note limiting factors. Patient presents here with multiple complaints. Patient was seen here 2 days ago for the same things. She states that she has nausea today and continues to have abdominal pain. She also has mouth pain secondary to thrush. She is very difficult to focus on her history. She states that she did get prescriptions when she was here 2 days ago but has not taken them yet. She thinks her lisinopril caused her to have thrush. She will not accept any other alternative diagnosis. She has not had a fever. She has not had a cough. She states that she threw up once today. She has not had any diarrhea. She denies any abdominal pain. She does have a headache. She says she has postnasal drainage and if she does not have Mucinex it gets worse and then she throws up. Prabhjot Mirza is a 80 y.o. female who presents to the emergency department      Nursing Notes were reviewed. REVIEW OF SYSTEMS    (2+ for4; 10+ for level 5)     Review of Systems   Constitutional: Positive for fatigue. Negative for appetite change, chills, fever and unexpected weight change. HENT: Positive for congestion, postnasal drip and rhinorrhea. Negative for drooling, nosebleeds and trouble swallowing. Eyes: Negative for pain and visual disturbance. Respiratory: Negative for cough, chest tightness, shortness of breath and wheezing.     Cardiovascular: Negative for chest pain, palpitations and leg swelling. Gastrointestinal: Positive for abdominal pain, nausea and vomiting. Negative for blood in stool and diarrhea. Endocrine: Negative for polydipsia and polyuria. Genitourinary: Negative for difficulty urinating, dysuria, flank pain, frequency, hematuria and urgency. Musculoskeletal: Negative for arthralgias, back pain, myalgias and neck pain. Skin: Negative for pallor and rash. Allergic/Immunologic: Negative for environmental allergies. Neurological: Positive for dizziness and weakness. Negative for syncope, speech difficulty, light-headedness, numbness and headaches. Hematological: Does not bruise/bleed easily. Psychiatric/Behavioral: Negative for confusion and decreased concentration. All other systems reviewed and are negative. PAST MEDICAL HISTORY     Past Medical History:   Diagnosis Date    Hyperlipidemia     Hypertension     Osteoporosis     Spinal stenosis     Thyroid disease        SURGICALHISTORY       Past Surgical History:   Procedure Laterality Date    HERNIA REPAIR         CURRENT MEDICATIONS       Previous Medications    ALBUTEROL SULFATE HFA (VENTOLIN HFA) 108 (90 BASE) MCG/ACT INHALER    Inhale 2 puffs into the lungs 4 times daily as needed for Wheezing    AMPHETAMINE-DEXTROAMPHETAMINE (ADDERALL XR) 10 MG EXTENDED RELEASE CAPSULE    take 1 capsule by mouth once daily    B COMPLEX VITAMINS (VITAMIN B COMPLEX PO)    Take 1 capsule by mouth daily    CHOLECALCIFEROL (VITAMIN D3) 10847 UNITS CAPS    Take 10,000 Units by mouth daily    COENZYME Q10 (COQ10 PO)    Take 1 capsule by mouth daily    DOXYCYCLINE HYCLATE (VIBRA-TABS) 100 MG TABLET    Take 1 tablet by mouth 2 times daily for 7 days    HYDROCODONE-ACETAMINOPHEN (NORCO) 5-325 MG PER TABLET    Take 1 tablet by mouth 4 times daily as needed for Pain. Lindsay Ochoa     LEVOTHYROXINE (SYNTHROID) 75 MCG TABLET    Take 75 mcg by mouth Daily    PANTOPRAZOLE (PROTONIX) 40 MG TABLET    Take 1 tablet by mouth every morning (before breakfast)    SYNTHROID 100 MCG TABLET    Take 100 mcg by mouth Takes 2 days per week Saturday and Sunday    VALSARTAN (DIOVAN) 160 MG TABLET    Take 160 mg by mouth daily            Patient has no known allergies. FAMILY HISTORY     History reviewed. No pertinent family history. SOCIAL HISTORY       Social History     Socioeconomic History    Marital status:      Spouse name: None    Number of children: None    Years of education: None    Highest education level: None   Occupational History    None   Social Needs    Financial resource strain: None    Food insecurity     Worry: None     Inability: None    Transportation needs     Medical: None     Non-medical: None   Tobacco Use    Smoking status: Former Smoker    Smokeless tobacco: Never Used   Substance and Sexual Activity    Alcohol use: No    Drug use: No    Sexual activity: None   Lifestyle    Physical activity     Days per week: None     Minutes per session: None    Stress: None   Relationships    Social connections     Talks on phone: None     Gets together: None     Attends Anabaptism service: None     Active member of club or organization: None     Attends meetings of clubs or organizations: None     Relationship status: None    Intimate partner violence     Fear of current or ex partner: None     Emotionally abused: None     Physically abused: None     Forced sexual activity: None   Other Topics Concern    None   Social History Narrative    None       SCREENINGS      @FLOW([id number])@    PHYSICAL EXAM    (5+ for level 4, 8+ for level 5)     ED Triage Vitals [11/14/20 1334]   BP Temp Temp Source Pulse Resp SpO2 Height Weight   (!) 164/83 98.1 °F (36.7 °C) Temporal 72 16 93 % 5' 2\" (1.575 m) 130 lb (59 kg)       Physical Exam  Vitals signs and nursing note reviewed. Constitutional:       General: She is not in acute distress. Appearance: She is well-developed.  She is not ill-appearing or diaphoretic. HENT:      Head: Normocephalic and atraumatic. Nose: Nose normal.      Mouth/Throat:      Mouth: Mucous membranes are moist.      Pharynx: No oropharyngeal exudate. Comments: I do not see any evidence of acute candidiasis intraorally. Eyes:      General: No scleral icterus. Right eye: No discharge. Left eye: No discharge. Conjunctiva/sclera: Conjunctivae normal.      Pupils: Pupils are equal, round, and reactive to light. Neck:      Musculoskeletal: Normal range of motion and neck supple. Thyroid: No thyromegaly. Vascular: No JVD. Trachea: No tracheal deviation. Cardiovascular:      Rate and Rhythm: Normal rate and regular rhythm. Heart sounds: Normal heart sounds. No murmur. No gallop. Pulmonary:      Effort: Pulmonary effort is normal. No respiratory distress. Breath sounds: Normal breath sounds. No stridor. No wheezing or rales. Chest:      Chest wall: No tenderness. Abdominal:      General: Abdomen is flat. There is no distension. Palpations: Abdomen is soft. There is no mass. Tenderness: There is no abdominal tenderness. There is no guarding or rebound. Musculoskeletal: Normal range of motion. General: No tenderness or deformity. Lymphadenopathy:      Cervical: No cervical adenopathy. Skin:     General: Skin is warm and dry. Coloration: Skin is not jaundiced or pale. Findings: No erythema or rash. Neurological:      General: No focal deficit present. Mental Status: She is alert and oriented to person, place, and time. Mental status is at baseline. Cranial Nerves: No cranial nerve deficit. Motor: No abnormal muscle tone. Coordination: Coordination normal.   Psychiatric:         Mood and Affect: Mood normal.         Behavior: Behavior normal.         Thought Content:  Thought content normal.         Judgment: Judgment normal.         DIAGNOSTIC RESULTS     EKG (Per Emergency Physician):       RADIOLOGY (Per Ozzie Tucker): Interpretation per the Radiologist below, if available at the time of this note:  Ct Abdomen Pelvis W Iv Contrast Additional Contrast? None    Result Date: 11/14/2020  EXAMINATION: CT OF THE ABDOMEN AND PELVIS WITH CONTRAST 11/14/2020 3:31 pm TECHNIQUE: CT of the abdomen and pelvis was performed with the administration of intravenous contrast. Multiplanar reformatted images are provided for review. Dose modulation, iterative reconstruction, and/or weight based adjustment of the mA/kV was utilized to reduce the radiation dose to as low as reasonably achievable. HISTORY: ORDERING SYSTEM PROVIDED HISTORY: ab pain with N/V/D TECHNOLOGIST PROVIDED HISTORY: Additional Contrast?->None Reason for exam:->ab pain with N/V/D IV contrast 75 mL of Isovue 370 FINDINGS: There is an eccentric to the left convex scoliotic curvature for the thoracolumbar spine. There are normal size enhancement for the liver, spleen. The gallbladder is normally distended, biliary tree and pancreatic duct systems are not dilated. There is some fat replacement of the pancreas. There is a moderate size hiatal hernia. Kidneys are preserved size and cortical thickness. Simple cysts are seen in the left kidney the small 1 is more difficult to be characterized. No obstructive uropathy is seen in the right and in the left kidneys. The kidneys have normal size and cortical enhancement. No obstruction seen. There is unremarkable appearance for the bladder. Uterus and ovaries appear unremarkable considering the age group. No acute inflammatory changes seen the mid mesentery fat planes, no free intraperitoneal air, size indication for bowel obstruction. There is no pericolonic inflammatory process. Appendix seen appear unremarkable. There is no mesenteric adenopathy. The There are some ectasia of the aorta following the curvature of the spine. The no aneurysm formation is seen.   No midline retroperitoneal adenopathy. Uncomplicated extensive diverticulosis seen in the left-sided colon. The there is no signs for acute diverticulitis. There is a small midline umbilical hernia with herniation of the preperitoneal fat. There is no incarceration. Areas of atelectasis are seen in both bases more in the left lower lobe in part relate with the hiatal hernia compressing the medial anterior aspect of the left lower lobe. There is no pleural effusions. No indication for an acute intraperitoneal retroperitoneal process in the abdomen.       :  Labs Reviewed   CBC WITH AUTO DIFFERENTIAL - Abnormal; Notable for the following components:       Result Value    MCHC 31.4 (*)     Lymphocytes % 16.5 (*)     Lymphocytes Absolute 1.02 (*)     All other components within normal limits   URINALYSIS WITH MICROSCOPIC - Abnormal; Notable for the following components:    Ketones, Urine 15 (*)     All other components within normal limits   POCT GLUCOSE - Abnormal; Notable for the following components:    Meter Glucose 171 (*)     All other components within normal limits   CULTURE, URINE   COMPREHENSIVE METABOLIC PANEL W/ REFLEX TO MG FOR LOW K   LIPASE   TROPONIN   LACTIC ACID, PLASMA       All other labs were within normal range or not returned as of this dictation.     EMERGENCY DEPARTMENT COURSE and DIFFERENTIALDIAGNOSIS/MDM:   Vitals:    Vitals:    11/14/20 1334   BP: (!) 164/83   Pulse: 72   Resp: 16   Temp: 98.1 °F (36.7 °C)   TempSrc: Temporal   SpO2: 93%   Weight: 130 lb (59 kg)   Height: 5' 2\" (1.575 m)       Medications   guaiFENesin tablet 400 mg (has no administration in time range)   ondansetron (ZOFRAN) injection 4 mg (4 mg Intravenous Given 11/14/20 1451)   iopamidol (ISOVUE-370) 76 % injection 75 mL (75 mLs Intravenous Given 11/14/20 1530)   ketorolac (TORADOL) injection 15 mg (15 mg Intravenous Given 11/14/20 1614)       MDM  Number of Diagnoses or Management Options  Generalized abdominal pain:   Nausea: Diagnosis management comments: Was evaluated for her complaints labs and a CT with IV contrast were ordered from triage. The patient at this time continues to be in no acute distress. She has a normal neurologic exam.  She is awake and alert. She is ambulating around the department. She was given some Zofran for nausea which apparently improved her symptoms and also given Mucinex. At this time is felt that she can be discharged to follow-up with her physician. She has an appointment on Monday and on Tuesday with 2 different physicians. She refuses to take her blood pressure medicine because she does not want to cause more thrush. I did advise her that this could be accounting for her elevated blood pressure and headache and she acknowledges this. At this time the patient is discharged. Have spoken with the patient and discussed todays results, in addition to providing specific details for the plan of care and counseling regarding the diagnosis and prognosis. Their questions are answered at this time and they are agreeable with the plan. At this time the patient is without objective evidence of an acute process requiring hospitalization or inpatient management. They have remained hemodynamically stable throughout their entire ED visit and are stable for discharge with outpatient follow-up  . CONSULTS:  None    PROCEDURES:  Unless otherwise noted below, none     Procedures    FINAL IMPRESSION      1. Nausea    2.  Generalized abdominal pain        DISPOSITION/PLAN   DISPOSITION Decision To Discharge 11/14/2020 05:46:33 PM      PATIENT REFERRED TO:  Trung Gordillo DO  [address] [id number] [phone]      keep appt      DISCHARGE MEDICATIONS:  New Prescriptions    ONDANSETRON (ZOFRAN) 4 MG TABLET    Take 1 tablet by mouth 3 times daily as needed for Nausea or Vomiting          (Please note:  Portions of this note were completed with a voice recognition program.  Efforts were made to edit thedictations but occasionally words and phrases are mis-transcribed.)    Form v2016. J.5-cn    Miranda Harrell MD (electronically signed)  Emergency Medicine Provider          Miranda Harrell MD  11/14/20 1800

## 2020-11-14 NOTE — ED NOTES
Pt's family Katherin Goodman (son and POA) contact number [phone]. Please update family when appropriate.      Neisha Patino RN  11/14/20 5661

## 2020-11-16 LAB — URINE CULTURE, ROUTINE: NORMAL

## 2021-03-04 ENCOUNTER — OFFICE VISIT (OUTPATIENT)
Dept: FAMILY MEDICINE CLINIC | Age: 86
End: 2021-03-04
Payer: MEDICARE

## 2021-03-04 VITALS
BODY MASS INDEX: 22.08 KG/M2 | RESPIRATION RATE: 18 BRPM | OXYGEN SATURATION: 95 % | WEIGHT: 120 LBS | TEMPERATURE: 97.8 F | HEIGHT: 62 IN | DIASTOLIC BLOOD PRESSURE: 88 MMHG | HEART RATE: 77 BPM | SYSTOLIC BLOOD PRESSURE: 166 MMHG

## 2021-03-04 DIAGNOSIS — M25.552 LEFT HIP PAIN: Primary | ICD-10-CM

## 2021-03-04 DIAGNOSIS — M25.572 ACUTE LEFT ANKLE PAIN: ICD-10-CM

## 2021-03-04 DIAGNOSIS — M25.562 ACUTE PAIN OF LEFT KNEE: ICD-10-CM

## 2021-03-04 PROCEDURE — 4040F PNEUMOC VAC/ADMIN/RCVD: CPT | Performed by: PHYSICIAN ASSISTANT

## 2021-03-04 PROCEDURE — 96372 THER/PROPH/DIAG INJ SC/IM: CPT | Performed by: PHYSICIAN ASSISTANT

## 2021-03-04 PROCEDURE — G8484 FLU IMMUNIZE NO ADMIN: HCPCS | Performed by: PHYSICIAN ASSISTANT

## 2021-03-04 PROCEDURE — G8427 DOCREV CUR MEDS BY ELIG CLIN: HCPCS | Performed by: PHYSICIAN ASSISTANT

## 2021-03-04 PROCEDURE — G8420 CALC BMI NORM PARAMETERS: HCPCS | Performed by: PHYSICIAN ASSISTANT

## 2021-03-04 PROCEDURE — 1036F TOBACCO NON-USER: CPT | Performed by: PHYSICIAN ASSISTANT

## 2021-03-04 PROCEDURE — 1123F ACP DISCUSS/DSCN MKR DOCD: CPT | Performed by: PHYSICIAN ASSISTANT

## 2021-03-04 PROCEDURE — 99214 OFFICE O/P EST MOD 30 MIN: CPT | Performed by: PHYSICIAN ASSISTANT

## 2021-03-04 PROCEDURE — 1090F PRES/ABSN URINE INCON ASSESS: CPT | Performed by: PHYSICIAN ASSISTANT

## 2021-03-04 RX ORDER — KETOROLAC TROMETHAMINE 30 MG/ML
10 INJECTION, SOLUTION INTRAMUSCULAR; INTRAVENOUS ONCE
Status: COMPLETED | OUTPATIENT
Start: 2021-03-04 | End: 2021-03-04

## 2021-03-04 RX ORDER — TIZANIDINE 2 MG/1
2 TABLET ORAL NIGHTLY PRN
Qty: 15 TABLET | Refills: 0 | Status: SHIPPED | OUTPATIENT
Start: 2021-03-04

## 2021-03-04 RX ADMIN — KETOROLAC TROMETHAMINE 9.9 MG: 30 INJECTION, SOLUTION INTRAMUSCULAR; INTRAVENOUS at 16:39

## 2021-03-04 NOTE — PROGRESS NOTES
3/4/21  Nohemi Landau : 1931 Sex: female  Age 80 y.o. Subjective:  Chief Complaint   Patient presents with    Leg Pain     left leg pain for a week          HPI:   Nohemi Landau , 80 y.o. female presents to express care for evaluation of left leg pain. The patient points to the left hip area, the left knee and the left ankle as the areas of pain. She has been exercising and working out more so over the last couple of weeks and believes that she may have aggravated it. No falls. No direct trauma. No swelling of the leg. The patient is not having any specific calf pain. The patient denies any chest pain, shortness of breath. The patient has not really been taking anything at home. The patient states that she cannot take steroids. ROS:   Unless otherwise stated in this report the patient's positive and negative responses for review of systems for constitutional, eyes, ENT, cardiovascular, respiratory, gastrointestinal, neurological, , musculoskeletal, and integument systems and related systems to the presenting problem are either stated in the history of present illness or were not pertinent or were negative for the symptoms and/or complaints related to the presenting medical problem. Positives and pertinent negatives as per HPI. All others reviewed and are negative. PMH:     Past Medical History:   Diagnosis Date    Hyperlipidemia     Hypertension     Osteoporosis     Spinal stenosis     Thyroid disease        Past Surgical History:   Procedure Laterality Date    HERNIA REPAIR         No family history on file.     Medications:     Current Outpatient Medications:     tiZANidine (ZANAFLEX) 2 MG tablet, Take 1 tablet by mouth nightly as needed (left leg pain), Disp: 15 tablet, Rfl: 0    ondansetron (ZOFRAN) 4 MG tablet, Take 1 tablet by mouth 3 times daily as needed for Nausea or Vomiting, Disp: 15 tablet, Rfl: 0    albuterol sulfate HFA (VENTOLIN HFA) 108 (90 Base) clear to auscultation, no wheezing, crackles or rhonchi  Back: Non-tender, no CVA tenderness bilaterally to percussion. GI: Normal bowel sounds, no tenderness to palpation, no masses appreciated. No rebound, guarding, or rigidity noted. Musculoskeletal: No obvious deformity noted to the left lower extremity or to the right lower extremity. No shortening or rotation noted. The patient does have reproducible pain to the left lateral hip and to the left knee as well as the left ankle. There is not 1 specific area that seems to be bothering her worse. There is no significant edema noted. No calf tenderness. Negative Homans' sign. The patient pulses intact the DP/PT 2+. The patient had no cyanosis or mottling. Neurological: A&O x4, normal speech      Testing:           Medical Decision Making:     Vital signs reviewed    Past medical history reviewed. Allergies reviewed. Medications reviewed. Patient on arrival does not appear to be in any apparent distress or discomfort. I discussed differential diagnosis with the patient. Offered ultrasound of the left lower extremity. Also offered images of the left hip, the left knee and left ankle and the patient declined. The patient believes that this is all from her working out and she may have strained these areas. The patient will be given 10 mg of ketorolac here in the office. The patient additionally will be given Zanaflex to use at night. The patient may use Tylenol. The patient is to use ice to the affected areas. Follow-up with PCP. Call with any questions or concerns      Clinical Impression:   Cari Pablo was seen today for leg pain. Diagnoses and all orders for this visit:    Left hip pain    Acute pain of left knee    Acute left ankle pain    Other orders  -     ketorolac (TORADOL) injection 9.9 mg  -     tiZANidine (ZANAFLEX) 2 MG tablet;  Take 1 tablet by mouth nightly as needed (left leg pain)        The patient is to call for any

## 2021-08-22 ENCOUNTER — HOSPITAL ENCOUNTER (EMERGENCY)
Age: 86
Discharge: HOME | End: 2021-08-22
Attending: EMERGENCY MEDICINE
Payer: MEDICARE

## 2021-08-22 ENCOUNTER — APPOINTMENT (OUTPATIENT)
Dept: CT IMAGING | Age: 86
End: 2021-08-22
Payer: MEDICARE

## 2021-08-22 VITALS
RESPIRATION RATE: 16 BRPM | TEMPERATURE: 97.3 F | HEIGHT: 62 IN | WEIGHT: 118 LBS | SYSTOLIC BLOOD PRESSURE: 147 MMHG | DIASTOLIC BLOOD PRESSURE: 63 MMHG | OXYGEN SATURATION: 96 % | HEART RATE: 65 BPM | BODY MASS INDEX: 21.71 KG/M2

## 2021-08-22 DIAGNOSIS — R51.9 ACUTE NONINTRACTABLE HEADACHE, UNSPECIFIED HEADACHE TYPE: Primary | ICD-10-CM

## 2021-08-22 DIAGNOSIS — I10 HYPERTENSION, UNSPECIFIED TYPE: ICD-10-CM

## 2021-08-22 LAB
ALBUMIN SERPL-MCNC: 4.1 G/DL (ref 3.5–5.2)
ALP BLD-CCNC: 57 U/L (ref 35–104)
ALT SERPL-CCNC: 14 U/L (ref 0–32)
ANION GAP SERPL CALCULATED.3IONS-SCNC: 9 MMOL/L (ref 7–16)
AST SERPL-CCNC: 17 U/L (ref 0–31)
BACTERIA: ABNORMAL /HPF
BASOPHILS ABSOLUTE: 0.07 E9/L (ref 0–0.2)
BASOPHILS RELATIVE PERCENT: 1.2 % (ref 0–2)
BILIRUB SERPL-MCNC: 0.2 MG/DL (ref 0–1.2)
BILIRUBIN URINE: NEGATIVE
BLOOD, URINE: NEGATIVE
BUN BLDV-MCNC: 23 MG/DL (ref 6–23)
CALCIUM SERPL-MCNC: 9.2 MG/DL (ref 8.6–10.2)
CHLORIDE BLD-SCNC: 103 MMOL/L (ref 98–107)
CLARITY: CLEAR
CO2: 26 MMOL/L (ref 22–29)
COLOR: YELLOW
CREAT SERPL-MCNC: 0.7 MG/DL (ref 0.5–1)
EKG ATRIAL RATE: 69 BPM
EKG P AXIS: 69 DEGREES
EKG P-R INTERVAL: 196 MS
EKG Q-T INTERVAL: 372 MS
EKG QRS DURATION: 82 MS
EKG QTC CALCULATION (BAZETT): 398 MS
EKG R AXIS: -5 DEGREES
EKG T AXIS: 42 DEGREES
EKG VENTRICULAR RATE: 69 BPM
EOSINOPHILS ABSOLUTE: 0.26 E9/L (ref 0.05–0.5)
EOSINOPHILS RELATIVE PERCENT: 4.4 % (ref 0–6)
GFR AFRICAN AMERICAN: >60
GFR NON-AFRICAN AMERICAN: >60 ML/MIN/1.73
GLUCOSE BLD-MCNC: 101 MG/DL (ref 74–99)
GLUCOSE URINE: NEGATIVE MG/DL
HCT VFR BLD CALC: 36 % (ref 34–48)
HEMOGLOBIN: 11.3 G/DL (ref 11.5–15.5)
IMMATURE GRANULOCYTES #: 0.01 E9/L
IMMATURE GRANULOCYTES %: 0.2 % (ref 0–5)
KETONES, URINE: NEGATIVE MG/DL
LEUKOCYTE ESTERASE, URINE: ABNORMAL
LYMPHOCYTES ABSOLUTE: 1.1 E9/L (ref 1.5–4)
LYMPHOCYTES RELATIVE PERCENT: 18.6 % (ref 20–42)
MCH RBC QN AUTO: 28.3 PG (ref 26–35)
MCHC RBC AUTO-ENTMCNC: 31.4 % (ref 32–34.5)
MCV RBC AUTO: 90 FL (ref 80–99.9)
MONOCYTES ABSOLUTE: 0.56 E9/L (ref 0.1–0.95)
MONOCYTES RELATIVE PERCENT: 9.5 % (ref 2–12)
NEUTROPHILS ABSOLUTE: 3.91 E9/L (ref 1.8–7.3)
NEUTROPHILS RELATIVE PERCENT: 66.1 % (ref 43–80)
NITRITE, URINE: NEGATIVE
PDW BLD-RTO: 14.6 FL (ref 11.5–15)
PH UA: 6.5 (ref 5–9)
PLATELET # BLD: 286 E9/L (ref 130–450)
PMV BLD AUTO: 9 FL (ref 7–12)
POTASSIUM REFLEX MAGNESIUM: 4.1 MMOL/L (ref 3.5–5)
PROTEIN UA: NEGATIVE MG/DL
RBC # BLD: 4 E12/L (ref 3.5–5.5)
RBC UA: ABNORMAL /HPF (ref 0–2)
SODIUM BLD-SCNC: 138 MMOL/L (ref 132–146)
SPECIFIC GRAVITY UA: 1.01 (ref 1–1.03)
TOTAL PROTEIN: 7 G/DL (ref 6.4–8.3)
UROBILINOGEN, URINE: 0.2 E.U./DL
WBC # BLD: 5.9 E9/L (ref 4.5–11.5)
WBC UA: ABNORMAL /HPF (ref 0–5)

## 2021-08-22 PROCEDURE — 99285 EMERGENCY DEPT VISIT HI MDM: CPT

## 2021-08-22 PROCEDURE — 80053 COMPREHEN METABOLIC PANEL: CPT

## 2021-08-22 PROCEDURE — 81001 URINALYSIS AUTO W/SCOPE: CPT

## 2021-08-22 PROCEDURE — 6370000000 HC RX 637 (ALT 250 FOR IP): Performed by: EMERGENCY MEDICINE

## 2021-08-22 PROCEDURE — 85025 COMPLETE CBC W/AUTO DIFF WBC: CPT

## 2021-08-22 PROCEDURE — 96374 THER/PROPH/DIAG INJ IV PUSH: CPT

## 2021-08-22 PROCEDURE — 6360000002 HC RX W HCPCS: Performed by: EMERGENCY MEDICINE

## 2021-08-22 PROCEDURE — 70450 CT HEAD/BRAIN W/O DYE: CPT

## 2021-08-22 PROCEDURE — 93005 ELECTROCARDIOGRAM TRACING: CPT | Performed by: EMERGENCY MEDICINE

## 2021-08-22 RX ORDER — ONDANSETRON 2 MG/ML
4 INJECTION INTRAMUSCULAR; INTRAVENOUS ONCE
Status: COMPLETED | OUTPATIENT
Start: 2021-08-22 | End: 2021-08-22

## 2021-08-22 RX ORDER — HYDROCODONE BITARTRATE AND ACETAMINOPHEN 5; 325 MG/1; MG/1
1 TABLET ORAL ONCE
Status: COMPLETED | OUTPATIENT
Start: 2021-08-22 | End: 2021-08-22

## 2021-08-22 RX ADMIN — ALUMINUM HYDROXIDE, MAGNESIUM HYDROXIDE, AND SIMETHICONE: 200; 200; 20 SUSPENSION ORAL at 10:00

## 2021-08-22 RX ADMIN — ONDANSETRON 4 MG: 2 INJECTION INTRAMUSCULAR; INTRAVENOUS at 09:59

## 2021-08-22 RX ADMIN — HYDROCODONE BITARTRATE AND ACETAMINOPHEN 1 TABLET: 5; 325 TABLET ORAL at 12:51

## 2021-08-22 ASSESSMENT — PAIN SCALES - GENERAL: PAINLEVEL_OUTOF10: 8

## 2021-08-22 NOTE — ED PROVIDER NOTES
HPI:  8/22/21,   Time: 9:34 AM EDT         Lexy Shea is a 80 y.o. female presenting to the ED for blood pressure headache bilateral lower extremity numbness and nausea, beginning 1 day ago. The complaint has been intermittent, moderate in severity, and worsened by nothing. Patient is currently on Augmentin for a sinusitis and she feels that her nausea is secondary to that. She states that she has a pinched nerve in her leg and that she has chronic numbness in her leg but it is different today. Patient also complains that she had very high blood pressure throughout the night but she took an extra dose of her blood pressure medication and her pressure is now essentially normal.  She denies chest pain shortness of breath vomiting or diarrhea. She has had no fever or chills    ROS:   Pertinent positives and negatives are stated within HPI, all other systems reviewed and are negative.  --------------------------------------------- PAST HISTORY ---------------------------------------------  Past Medical History:  has a past medical history of Hyperlipidemia, Hypertension, Osteoporosis, Spinal stenosis, and Thyroid disease. Past Surgical History:  has a past surgical history that includes hernia repair. Social History:  reports that she has quit smoking. She has never used smokeless tobacco. She reports that she does not drink alcohol and does not use drugs. Family History: family history is not on file. The patients home medications have been reviewed. Allergies: Patient has no known allergies.     -------------------------------------------------- RESULTS -------------------------------------------------  All laboratory and radiology results have been personally reviewed by myself   LABS:  Results for orders placed or performed during the hospital encounter of 08/22/21   CBC Auto Differential   Result Value Ref Range    WBC 5.9 4.5 - 11.5 E9/L    RBC 4.00 3.50 - 5.50 E12/L    Hemoglobin 11.3 (L) 11.5 - 15.5 g/dL    Hematocrit 36.0 34.0 - 48.0 %    MCV 90.0 80.0 - 99.9 fL    MCH 28.3 26.0 - 35.0 pg    MCHC 31.4 (L) 32.0 - 34.5 %    RDW 14.6 11.5 - 15.0 fL    Platelets 093 341 - 203 E9/L    MPV 9.0 7.0 - 12.0 fL    Neutrophils % 66.1 43.0 - 80.0 %    Immature Granulocytes % 0.2 0.0 - 5.0 %    Lymphocytes % 18.6 (L) 20.0 - 42.0 %    Monocytes % 9.5 2.0 - 12.0 %    Eosinophils % 4.4 0.0 - 6.0 %    Basophils % 1.2 0.0 - 2.0 %    Neutrophils Absolute 3.91 1.80 - 7.30 E9/L    Immature Granulocytes # 0.01 E9/L    Lymphocytes Absolute 1.10 (L) 1.50 - 4.00 E9/L    Monocytes Absolute 0.56 0.10 - 0.95 E9/L    Eosinophils Absolute 0.26 0.05 - 0.50 E9/L    Basophils Absolute 0.07 0.00 - 0.20 E9/L   Comprehensive Metabolic Panel w/ Reflex to MG   Result Value Ref Range    Sodium 138 132 - 146 mmol/L    Potassium reflex Magnesium 4.1 3.5 - 5.0 mmol/L    Chloride 103 98 - 107 mmol/L    CO2 26 22 - 29 mmol/L    Anion Gap 9 7 - 16 mmol/L    Glucose 101 (H) 74 - 99 mg/dL    BUN 23 6 - 23 mg/dL    CREATININE 0.7 0.5 - 1.0 mg/dL    GFR Non-African American >60 >=60 mL/min/1.73    GFR African American >60     Calcium 9.2 8.6 - 10.2 mg/dL    Total Protein 7.0 6.4 - 8.3 g/dL    Albumin 4.1 3.5 - 5.2 g/dL    Total Bilirubin 0.2 0.0 - 1.2 mg/dL    Alkaline Phosphatase 57 35 - 104 U/L    ALT 14 0 - 32 U/L    AST 17 0 - 31 U/L   Urinalysis, reflex to microscopic   Result Value Ref Range    Color, UA Yellow Straw/Yellow    Clarity, UA Clear Clear    Glucose, Ur Negative Negative mg/dL    Bilirubin Urine Negative Negative    Ketones, Urine Negative Negative mg/dL    Specific Gravity, UA 1.010 1.005 - 1.030    Blood, Urine Negative Negative    pH, UA 6.5 5.0 - 9.0    Protein, UA Negative Negative mg/dL    Urobilinogen, Urine 0.2 <2.0 E.U./dL    Nitrite, Urine Negative Negative    Leukocyte Esterase, Urine TRACE (A) Negative   Microscopic Urinalysis   Result Value Ref Range    WBC, UA 0-1 0 - 5 /HPF    RBC, UA NONE 0 - 2 /HPF Bacteria, UA RARE (A) None Seen /HPF   EKG 12 Lead   Result Value Ref Range    Ventricular Rate 69 BPM    Atrial Rate 69 BPM    P-R Interval 196 ms    QRS Duration 82 ms    Q-T Interval 372 ms    QTc Calculation (Bazett) 398 ms    P Axis 69 degrees    R Axis -5 degrees    T Axis 42 degrees   EKG: This EKG is signed and interpreted by me. Rate: 69  Rhythm: Sinus  Interpretation: no acute changes  Comparison: stable as compared to patient's most recent EKG    RADIOLOGY:  Interpreted by Radiologist.  CT Head WO Contrast   Final Result   No acute intracranial abnormality. No significant changes since the November 12, 2020 study. ------------------------- NURSING NOTES AND VITALS REVIEWED ---------------------------   The nursing notes within the ED encounter and vital signs as below have been reviewed. BP (!) 147/63   Pulse 65   Temp 97.3 °F (36.3 °C) (Oral)   Resp 16   Ht 5' 2\" (1.575 m)   Wt 118 lb (53.5 kg)   SpO2 96%   BMI 21.58 kg/m²   Oxygen Saturation Interpretation: Normal      ---------------------------------------------------PHYSICAL EXAM--------------------------------------      Constitutional/General: Alert and oriented x3, well appearing, non toxic in NAD  Head: NC/AT  Eyes: PERRL, EOMI  Mouth: Oropharynx clear, handling secretions, no trismus  Neck: Supple, full ROM, no meningeal signs  Pulmonary: Lungs clear to auscultation bilaterally, no wheezes, rales, or rhonchi. Not in respiratory distress  Cardiovascular:  Regular rate and rhythm, no murmurs, gallops, or rubs. 2+ distal pulses  Abdomen: Soft, non tender, non distended,   Extremities: Moves all extremities x 4.  Warm and well perfused  Skin: warm and dry without rash  Neurologic: GCS 15, cranial nerves II through XII intact 5+ strength normal gait no pronator drift no Romberg sign normal sensation all extremities  Psych: Anxious angry affect      ------------------------------ ED COURSE/MEDICAL DECISION MAKING----------------------  Medications   ondansetron (ZOFRAN) injection 4 mg (4 mg Intravenous Given 8/22/21 0985)   aluminum & magnesium hydroxide-simethicone (MAALOX) 30 mL, lidocaine viscous hcl (XYLOCAINE) 5 mL (GI COCKTAIL) ( Oral Given 8/22/21 1000)   HYDROcodone-acetaminophen (NORCO) 5-325 MG per tablet 1 tablet (1 tablet Oral Given 8/22/21 1251)         Medical Decision Making:    Patient was medicated for nausea given a GI cocktail diagnostic testing was  Performed. Testing was reassuring. Patient still had a mild headache for which she was given a hydrocodone tablet. Patient was discharged to follow-up with her primary care doctor and continue her meds. Counseling: The emergency provider has spoken with the patient and discussed todays results, in addition to providing specific details for the plan of care and counseling regarding the diagnosis and prognosis. Questions are answered at this time and they are agreeable with the plan.      --------------------------------- IMPRESSION AND DISPOSITION ---------------------------------    IMPRESSION  1. Acute nonintractable headache, unspecified headache type    2.  Hypertension, unspecified type        DISPOSITION  Disposition: Discharge to home  Patient condition is stable                  Lalo Kong MD  08/22/21 2602

## 2021-08-22 NOTE — ED NOTES
Bed: 11  Expected date:   Expected time:   Means of arrival:   Comments:  EMS     Sandra Gregg, RN  08/22/21 9046

## 2022-03-07 ENCOUNTER — APPOINTMENT (OUTPATIENT)
Dept: GENERAL RADIOLOGY | Age: 87
DRG: 482 | End: 2022-03-07
Payer: MEDICARE

## 2022-03-07 ENCOUNTER — HOSPITAL ENCOUNTER (INPATIENT)
Age: 87
LOS: 4 days | Discharge: NURSING, REHAB OR LONG-TERM CARE | DRG: 482 | End: 2022-03-11
Attending: STUDENT IN AN ORGANIZED HEALTH CARE EDUCATION/TRAINING PROGRAM | Admitting: INTERNAL MEDICINE
Payer: MEDICARE

## 2022-03-07 DIAGNOSIS — S72.144A CLOSED NONDISPLACED INTERTROCHANTERIC FRACTURE OF RIGHT FEMUR, INITIAL ENCOUNTER (HCC): Primary | ICD-10-CM
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
## 2022-03-07 PROBLEM — S72.141A INTERTROCHANTERIC FRACTURE OF RIGHT HIP, CLOSED, INITIAL ENCOUNTER (HCC): Status: ACTIVE | Noted: 2022-03-07

## 2022-03-07 PROBLEM — E03.9 HYPOTHYROIDISM: Status: ACTIVE | Noted: 2022-03-07

## 2022-03-07 PROBLEM — S72.141A INTERTROCHANTERIC FRACTURE OF RIGHT FEMUR, CLOSED, INITIAL ENCOUNTER (HCC): Status: ACTIVE | Noted: 2022-03-07

## 2022-03-07 PROBLEM — K21.9 GASTROESOPHAGEAL REFLUX DISEASE WITHOUT ESOPHAGITIS: Status: ACTIVE | Noted: 2022-03-07

## 2022-03-07 PROBLEM — I10 PRIMARY HYPERTENSION: Status: ACTIVE | Noted: 2022-03-07

## 2022-03-07 LAB
ABO/RH: NORMAL
ALBUMIN SERPL-MCNC: 4.2 G/DL (ref 3.5–5.2)
ALP BLD-CCNC: 51 U/L (ref 35–104)
ALT SERPL-CCNC: 19 U/L (ref 0–32)
ANION GAP SERPL CALCULATED.3IONS-SCNC: 14 MMOL/L (ref 7–16)
ANTIBODY SCREEN: NORMAL
APTT: 28.7 SEC (ref 24.5–35.1)
AST SERPL-CCNC: 20 U/L (ref 0–31)
BASOPHILS ABSOLUTE: 0.03 E9/L (ref 0–0.2)
BASOPHILS RELATIVE PERCENT: 0.3 % (ref 0–2)
BILIRUB SERPL-MCNC: 0.3 MG/DL (ref 0–1.2)
BILIRUBIN DIRECT: <0.2 MG/DL (ref 0–0.3)
BILIRUBIN, INDIRECT: NORMAL MG/DL (ref 0–1)
BUN BLDV-MCNC: 16 MG/DL (ref 6–23)
CALCIUM SERPL-MCNC: 9.4 MG/DL (ref 8.6–10.2)
CHLORIDE BLD-SCNC: 102 MMOL/L (ref 98–107)
CO2: 25 MMOL/L (ref 22–29)
CREAT SERPL-MCNC: 0.7 MG/DL (ref 0.5–1)
EOSINOPHILS ABSOLUTE: 0.08 E9/L (ref 0.05–0.5)
EOSINOPHILS RELATIVE PERCENT: 0.8 % (ref 0–6)
GFR AFRICAN AMERICAN: >60
GFR NON-AFRICAN AMERICAN: >60 ML/MIN/1.73
GLUCOSE BLD-MCNC: 90 MG/DL (ref 74–99)
HCT VFR BLD CALC: 37.4 % (ref 34–48)
HEMOGLOBIN: 11.8 G/DL (ref 11.5–15.5)
IMMATURE GRANULOCYTES #: 0.07 E9/L
IMMATURE GRANULOCYTES %: 0.7 % (ref 0–5)
INR BLD: 1
LYMPHOCYTES ABSOLUTE: 1.24 E9/L (ref 1.5–4)
LYMPHOCYTES RELATIVE PERCENT: 11.6 % (ref 20–42)
MCH RBC QN AUTO: 27.3 PG (ref 26–35)
MCHC RBC AUTO-ENTMCNC: 31.6 % (ref 32–34.5)
MCV RBC AUTO: 86.6 FL (ref 80–99.9)
MONOCYTES ABSOLUTE: 0.58 E9/L (ref 0.1–0.95)
MONOCYTES RELATIVE PERCENT: 5.4 % (ref 2–12)
NEUTROPHILS ABSOLUTE: 8.65 E9/L (ref 1.8–7.3)
NEUTROPHILS RELATIVE PERCENT: 81.2 % (ref 43–80)
PDW BLD-RTO: 16.5 FL (ref 11.5–15)
PLATELET # BLD: 234 E9/L (ref 130–450)
PMV BLD AUTO: 8.9 FL (ref 7–12)
POTASSIUM REFLEX MAGNESIUM: 4 MMOL/L (ref 3.5–5)
PROTHROMBIN TIME: 11.6 SEC (ref 9.3–12.4)
RBC # BLD: 4.32 E12/L (ref 3.5–5.5)
SODIUM BLD-SCNC: 141 MMOL/L (ref 132–146)
TOTAL PROTEIN: 7.4 G/DL (ref 6.4–8.3)
WBC # BLD: 10.7 E9/L (ref 4.5–11.5)

## 2022-03-07 PROCEDURE — 71045 X-RAY EXAM CHEST 1 VIEW: CPT

## 2022-03-07 PROCEDURE — 80076 HEPATIC FUNCTION PANEL: CPT

## 2022-03-07 PROCEDURE — 93005 ELECTROCARDIOGRAM TRACING: CPT | Performed by: STUDENT IN AN ORGANIZED HEALTH CARE EDUCATION/TRAINING PROGRAM

## 2022-03-07 PROCEDURE — 86900 BLOOD TYPING SEROLOGIC ABO: CPT

## 2022-03-07 PROCEDURE — 1200000000 HC SEMI PRIVATE

## 2022-03-07 PROCEDURE — 85610 PROTHROMBIN TIME: CPT

## 2022-03-07 PROCEDURE — 85730 THROMBOPLASTIN TIME PARTIAL: CPT

## 2022-03-07 PROCEDURE — 6370000000 HC RX 637 (ALT 250 FOR IP): Performed by: STUDENT IN AN ORGANIZED HEALTH CARE EDUCATION/TRAINING PROGRAM

## 2022-03-07 PROCEDURE — 85025 COMPLETE CBC W/AUTO DIFF WBC: CPT

## 2022-03-07 PROCEDURE — 73502 X-RAY EXAM HIP UNI 2-3 VIEWS: CPT

## 2022-03-07 PROCEDURE — 86901 BLOOD TYPING SEROLOGIC RH(D): CPT

## 2022-03-07 PROCEDURE — 80048 BASIC METABOLIC PNL TOTAL CA: CPT

## 2022-03-07 PROCEDURE — 73552 X-RAY EXAM OF FEMUR 2/>: CPT

## 2022-03-07 PROCEDURE — 99284 EMERGENCY DEPT VISIT MOD MDM: CPT

## 2022-03-07 PROCEDURE — 86850 RBC ANTIBODY SCREEN: CPT

## 2022-03-07 RX ORDER — B-COMPLEX WITH VITAMIN C
1 TABLET ORAL DAILY
Status: DISCONTINUED | OUTPATIENT
Start: 2022-03-08 | End: 2022-03-07 | Stop reason: CLARIF

## 2022-03-07 RX ORDER — ALBUTEROL SULFATE 90 UG/1
2 AEROSOL, METERED RESPIRATORY (INHALATION) EVERY 6 HOURS PRN
Status: DISCONTINUED | OUTPATIENT
Start: 2022-03-07 | End: 2022-03-07 | Stop reason: CLARIF

## 2022-03-07 RX ORDER — SODIUM CHLORIDE 9 MG/ML
25 INJECTION, SOLUTION INTRAVENOUS PRN
Status: DISCONTINUED | OUTPATIENT
Start: 2022-03-07 | End: 2022-03-11 | Stop reason: HOSPADM

## 2022-03-07 RX ORDER — OXYCODONE HYDROCHLORIDE AND ACETAMINOPHEN 5; 325 MG/1; MG/1
1 TABLET ORAL ONCE
Status: COMPLETED | OUTPATIENT
Start: 2022-03-07 | End: 2022-03-07

## 2022-03-07 RX ORDER — PROCHLORPERAZINE EDISYLATE 5 MG/ML
5 INJECTION INTRAMUSCULAR; INTRAVENOUS EVERY 6 HOURS PRN
Status: DISCONTINUED | OUTPATIENT
Start: 2022-03-07 | End: 2022-03-11 | Stop reason: HOSPADM

## 2022-03-07 RX ORDER — SODIUM CHLORIDE 0.9 % (FLUSH) 0.9 %
5-40 SYRINGE (ML) INJECTION PRN
Status: DISCONTINUED | OUTPATIENT
Start: 2022-03-07 | End: 2022-03-11 | Stop reason: HOSPADM

## 2022-03-07 RX ORDER — PANTOPRAZOLE SODIUM 40 MG/1
40 TABLET, DELAYED RELEASE ORAL
Status: DISCONTINUED | OUTPATIENT
Start: 2022-03-08 | End: 2022-03-11 | Stop reason: HOSPADM

## 2022-03-07 RX ORDER — MORPHINE SULFATE 1 MG/ML
1 INJECTION, SOLUTION EPIDURAL; INTRATHECAL; INTRAVENOUS EVERY 6 HOURS PRN
Status: DISCONTINUED | OUTPATIENT
Start: 2022-03-07 | End: 2022-03-11 | Stop reason: HOSPADM

## 2022-03-07 RX ORDER — ACETAMINOPHEN 325 MG/1
650 TABLET ORAL EVERY 6 HOURS PRN
Status: DISCONTINUED | OUTPATIENT
Start: 2022-03-07 | End: 2022-03-11 | Stop reason: HOSPADM

## 2022-03-07 RX ORDER — MORPHINE SULFATE 2 MG/ML
2 INJECTION, SOLUTION INTRAMUSCULAR; INTRAVENOUS EVERY 6 HOURS PRN
Status: DISCONTINUED | OUTPATIENT
Start: 2022-03-07 | End: 2022-03-11 | Stop reason: HOSPADM

## 2022-03-07 RX ORDER — SODIUM CHLORIDE 0.9 % (FLUSH) 0.9 %
5-40 SYRINGE (ML) INJECTION EVERY 12 HOURS SCHEDULED
Status: DISCONTINUED | OUTPATIENT
Start: 2022-03-07 | End: 2022-03-11 | Stop reason: HOSPADM

## 2022-03-07 RX ORDER — ALBUTEROL SULFATE 2.5 MG/3ML
2.5 SOLUTION RESPIRATORY (INHALATION) EVERY 6 HOURS PRN
Status: DISCONTINUED | OUTPATIENT
Start: 2022-03-07 | End: 2022-03-11 | Stop reason: HOSPADM

## 2022-03-07 RX ORDER — VALSARTAN 160 MG/1
160 TABLET ORAL DAILY
Status: DISCONTINUED | OUTPATIENT
Start: 2022-03-08 | End: 2022-03-11 | Stop reason: HOSPADM

## 2022-03-07 RX ORDER — ACETAMINOPHEN 650 MG/1
650 SUPPOSITORY RECTAL EVERY 6 HOURS PRN
Status: DISCONTINUED | OUTPATIENT
Start: 2022-03-07 | End: 2022-03-11 | Stop reason: HOSPADM

## 2022-03-07 RX ORDER — BISACODYL 10 MG
10 SUPPOSITORY, RECTAL RECTAL DAILY PRN
Status: DISCONTINUED | OUTPATIENT
Start: 2022-03-07 | End: 2022-03-11 | Stop reason: HOSPADM

## 2022-03-07 RX ADMIN — OXYCODONE AND ACETAMINOPHEN 1 TABLET: 5; 325 TABLET ORAL at 19:16

## 2022-03-07 ASSESSMENT — ENCOUNTER SYMPTOMS
BACK PAIN: 0
ABDOMINAL PAIN: 0
EYE DISCHARGE: 0
SORE THROAT: 0
COUGH: 0
NAUSEA: 0
WHEEZING: 0
SINUS PRESSURE: 0
DIARRHEA: 0
SHORTNESS OF BREATH: 0
VOMITING: 0
EYE PAIN: 0

## 2022-03-07 ASSESSMENT — PAIN SCALES - GENERAL
PAINLEVEL_OUTOF10: 4
PAINLEVEL_OUTOF10: 8

## 2022-03-07 NOTE — ED PROVIDER NOTES
70-year-old female presenting to the emergency department for mechanical fall at home, states she was wearing fuzzy socks that were slippery and she fell, landed on her right side, complaining of right hip pain, worsens with palpation and external/internal rotation, moderate in severity, constant, sudden onset. She denies any head, denies being on any blood thinners. Only other complaint is chronic left-sided knee pain from a pinched nerve per her. Review of Systems   Constitutional: Negative for chills and fever. HENT: Negative for ear pain, sinus pressure and sore throat. Eyes: Negative for pain and discharge. Respiratory: Negative for cough, shortness of breath and wheezing. Cardiovascular: Negative for chest pain. Gastrointestinal: Negative for abdominal pain, diarrhea, nausea and vomiting. Genitourinary: Negative for dysuria and frequency. Musculoskeletal: Positive for arthralgias (Right hip pain, left knee pain chronic). Negative for back pain. Skin: Negative for rash and wound. Neurological: Negative for weakness and headaches. Hematological: Negative for adenopathy. All other systems reviewed and are negative. Physical Exam  Vitals and nursing note reviewed. Constitutional:       Appearance: Normal appearance. HENT:      Head: Normocephalic and atraumatic. Right Ear: External ear normal.      Left Ear: External ear normal.      Nose: Nose normal.      Mouth/Throat:      Mouth: Mucous membranes are moist.   Eyes:      Extraocular Movements: Extraocular movements intact. Pupils: Pupils are equal, round, and reactive to light. Cardiovascular:      Rate and Rhythm: Normal rate and regular rhythm. Pulses: Normal pulses. Heart sounds: Normal heart sounds. Pulmonary:      Effort: Pulmonary effort is normal.      Breath sounds: Normal breath sounds. Abdominal:      General: Abdomen is flat.  Bowel sounds are normal.      Palpations: Abdomen is soft.   Musculoskeletal:         General: Tenderness (External/internal rotation on the right hip hurts) present. Cervical back: Normal range of motion and neck supple. Skin:     General: Skin is warm and dry. Neurological:      General: No focal deficit present. Mental Status: She is alert and oriented to person, place, and time. Cranial Nerves: No cranial nerve deficit. Sensory: No sensory deficit. Motor: Weakness (Hip extension secondary to pain) present. Procedures     [facility]     ED Course as of 03/07/22 2212   [facility] Mar 07, 2022   9875 Patient is seen Dr. Mariposa Hatch before, appears to have an intertrochanteric fracture, will admit, put out consult orthopedic surgery. Patient is stable at this time [JG]   2144 Patient is stable at this time. [JG]   2212 EKG: This EKG is signed by emergency department physician. Rate: 91  Rhythm: Sinus  Interpretation: Normal sinus rhythm, nonspecific ST abnormality  Comparison: changes compared to previous EKG      [JG]   [age]year-old female presenting emerged part for fall, slipped on the floor due to slippery socks, landed on right side, found had a right-sided intertrochanteric fracture, consult orthopedic surgery, admitted to hospital for further work-up management. [JG]      ED Course User Index  [JG] Virgilio Mendoza MD      25-year-old female presenting emerged part for fall, slipped on the floor due to slippery socks, landed on right side, found had a right-sided intertrochanteric fracture, consult orthopedic surgery, admitted to hospital for further work-up management. ED Course as of 03/07/22 2212   [facility] Mckenzie 07, 2022   3641 Patient is seen Dr. Mariposa Hatch before, appears to have an intertrochanteric fracture, will admit, put out consult orthopedic surgery. Patient is stable at this time [JG]   2144 Patient is stable at this time. [JG]   2212 EKG: This EKG is signed by emergency department physician.     Rate: 91  Rhythm: Sinus  Interpretation: Normal sinus rhythm, nonspecific ST abnormality  Comparison: changes compared to previous EKG      [JG]   [age]year-old female presenting emerged part for fall, slipped on the floor due to slippery socks, landed on right side, found had a right-sided intertrochanteric fracture, consult orthopedic surgery, admitted to hospital for further work-up management. [JG]      ED Course User Index  [JG] Willodean Kussmaul, MD       --------------------------------------------- PAST HISTORY ---------------------------------------------  Past Medical History:  has a past medical history of Hyperlipidemia, Hypertension, Osteoporosis, Spinal stenosis, and Thyroid disease. Past Surgical History:  has a past surgical history that includes hernia repair. Social History:  reports that she has quit smoking. She has never used smokeless tobacco. She reports that she does not drink alcohol and does not use drugs. Family History: family history is not on file. The patients home medications have been reviewed. Allergies: Patient has no known allergies.     -------------------------------------------------- RESULTS -------------------------------------------------    LABS:  Results for orders placed or performed during the hospital encounter of 03/07/22   CBC with Auto Differential   Result Value Ref Range    WBC 10.7 4.5 - 11.5 E9/L    RBC 4.32 3.50 - 5.50 E12/L    Hemoglobin 11.8 11.5 - 15.5 g/dL    Hematocrit 37.4 34.0 - 48.0 %    MCV 86.6 80.0 - 99.9 fL    MCH 27.3 26.0 - 35.0 pg    MCHC 31.6 (L) 32.0 - 34.5 %    RDW 16.5 (H) 11.5 - 15.0 fL    Platelets 622 278 - 256 E9/L    MPV 8.9 7.0 - 12.0 fL    Neutrophils % 81.2 (H) 43.0 - 80.0 %    Immature Granulocytes % 0.7 0.0 - 5.0 %    Lymphocytes % 11.6 (L) 20.0 - 42.0 %    Monocytes % 5.4 2.0 - 12.0 %    Eosinophils % 0.8 0.0 - 6.0 %    Basophils % 0.3 0.0 - 2.0 %    Neutrophils Absolute 8.65 (H) 1.80 - 7.30 E9/L    Immature Granulocytes # 0.07 E9/L Lymphocytes Absolute 1.24 (L) 1.50 - 4.00 E9/L    Monocytes Absolute 0.58 0.10 - 0.95 E9/L    Eosinophils Absolute 0.08 0.05 - 0.50 E9/L    Basophils Absolute 0.03 0.00 - 0.20 W6/W   Basic Metabolic Panel w/ Reflex to MG   Result Value Ref Range    Sodium 141 132 - 146 mmol/L    Potassium reflex Magnesium 4.0 3.5 - 5.0 mmol/L    Chloride 102 98 - 107 mmol/L    CO2 25 22 - 29 mmol/L    Anion Gap 14 7 - 16 mmol/L    Glucose 90 74 - 99 mg/dL    BUN 16 6 - 23 mg/dL    CREATININE 0.7 0.5 - 1.0 mg/dL    GFR Non-African American >60 >=60 mL/min/1.73    GFR African American >60     Calcium 9.4 8.6 - 10.2 mg/dL   Hepatic Function Panel   Result Value Ref Range    Total Protein 7.4 6.4 - 8.3 g/dL    Albumin 4.2 3.5 - 5.2 g/dL    Alkaline Phosphatase 51 35 - 104 U/L    ALT 19 0 - 32 U/L    AST 20 0 - 31 U/L    Total Bilirubin 0.3 0.0 - 1.2 mg/dL    Bilirubin, Direct <0.2 0.0 - 0.3 mg/dL    Bilirubin, Indirect see below 0.0 - 1.0 mg/dL   Protime-INR   Result Value Ref Range    Protime 11.6 9.3 - 12.4 sec    INR 1.0    APTT   Result Value Ref Range    aPTT 28.7 24.5 - 35.1 sec   TYPE AND SCREEN   Result Value Ref Range    ABO/Rh A POS     Antibody Screen NEG        RADIOLOGY:  XR CHEST PORTABLE   Final Result   Elevation right hemidiaphragm. No acute pulmonary or cardiac abnormalities. XR HIP 2-3 VW W PELVIS RIGHT   Final Result   Nondisplaced intertrochanteric fracture at the right femur. 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 XR FEMUR RIGHT (MIN 2 VIEWS)   Final Result   Nondisplaced intertrochanteric fracture at the right femur. 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               ------------------------- NURSING NOTES AND VITALS REVIEWED ---------------------------  Date / Time Roomed:  3/7/2022  5:15 PM  ED Bed Assignment:  [facility]    The nursing notes within the ED encounter and vital signs as below have been reviewed.      Patient Vitals for the past 24 hrs:   BP Temp Temp src Pulse Resp SpO2   03/07/22 1721 (!) 158/72 97.8 °F (36.6 °C) Oral 80 17 97 %       Oxygen Saturation Interpretation: Normal    ------------------------------------------ PROGRESS NOTES ------------------------------------------      Counseling:  I have spoken with the patient and discussed todays results, in addition to providing specific details for the plan of care and counseling regarding the diagnosis and prognosis. Their questions are answered at this time and they are agreeable with the plan of admission.    --------------------------------- ADDITIONAL PROVIDER NOTES ---------------------------------  Consultations:  Time: 2212. Spoke with NATALI. Discussed case. They will admit the patient. This patient's ED course included: a personal history and physicial examination, re-evaluation prior to disposition, multiple bedside re-evaluations, cardiac monitoring and continuous pulse oximetry    This patient has remained hemodynamically stable during their ED course. Diagnosis:  1. Closed nondisplaced intertrochanteric fracture of right femur, initial encounter ([facility]    2. Fall, initial encounter        Disposition:  Patient's disposition: Admit to med/surg floor  Patient's condition is stable.             Rj Chisholm MD  Resident  03/07/22 3800

## 2022-03-07 NOTE — LETTER
Left hand laceration with a table saw.  Bleeding controlled.  Tetanus last 2007.  Patient alert and oriented x3.  Airway, breathing and circulation intact.   [place] Department Medicaid  CERTIFICATION OF NECESSITY  FOR NON-EMERGENCY TRANSPORTATION   BY GROUND AMBULANCE      Individual Information   1. Name: Gutierrez Jennings 2. [place] Medicaid Billing Number:    3. Address: [address] [place] [address]      Transportation Provider Information   4. Provider Name:    5. [place] Medicaid Provider Number:  National Provider Identifier (NPI):      Certification  7. Criteria:  During transport, this individual requires:  [x] Medical treatment or continuous     supervision by an EMT. [] The administration or regulation of oxygen by another person. [] Supervised protective restraint. 8. Period Beginning Date: 3/9/2022   9. Length  [x] Not more than 7 day(s)  [] One Year     Additional Information Relevant to Certification   10. Comments or Explanations, If Necessary or Appropriate ITALIA Khan Practitioner Information   11. Name of Practitioner: DR. CONSTANTINO ZULETA MD   12. [place] Medicaid Provider Number, If Applicable:  [address] Provider Identifier (NPI):      Signature Information   14. Date of Signature: 3/9/2022 15. Name of Person Signing: Prosper Sweet   16. Signature and Professional Designation: Electronically signed by JOSE GUADALUPE Hunt on 3/9/2022 at 2:08 PM       [facility] [address]  Rev. 7/2015    [facility] Encounter Date/Time: 3/7/2022 Cristiana Vargas 53 Account: [de-identified]    MRN: [id number]    Patient: Gutierrez Jennings    Contact Serial #: [id number]      ENCOUNTER          Patient Class: I Private Enc?   No Unit  BD: [address] [facility] Service: MED   Encounter DX: Closed nondisplaced inte*   ADM Provider: Carmel Tran MD   Procedure:     ATT Provider: Carmel Tran MD   REF Provider:        Admission DX: Closed nondisplaced intertrochanteric fracture of right femur, initial encounter ([facility] Fall, initial encounter, Intertrochanteric fracture of right hip, closed, initial encounter ([facility] Intertrochanteric fracture of right femur, closed, initial encounter ([facility] and DX codes: S72.144A, W19. John Duran J47.980P, R98.730C      PATIENT                 Name: Ida Rivera : 1931 (89 yrs)   Address: [place] [address] Sex: Female   [facility] [address]         Marital Status:    Employer: RETIRED         Jain: None   Primary Care Provider: Shyanne Woo DO         Primary Phone: [phone]   EMERGENCY CONTACT   Contact Name Legal Guardian? Relationship to Patient Home Phone Work Phone   1. Emily Shrestha    No Child  Child [phone]              GUARANTOR            Guarantor: Ida Rivera     : 1931   Address: [address] [place] [address] Sex: Female   Sarah [place] [id number]     Relation to Patient: Self       Home Phone: [phone]   Guarantor ID: [id number]       Work Phone:     Guarantor Employer: RETIRED         Status: RETIRED      COVERAGE        PRIMARY INSURANCE   Payor: MEDICARE Plan: MEDICARE PART A AND B   Payor Address: [place] [address],  [facility]       Group Number:   Insurance Type: INDEMNITY   Subscriber Name: Sultana Key : 1931   Subscriber ID: [id number] Eldon Roberts. Rel. to Sub: Self   SECONDARY INSURANCE   Payor: [facility] HEALTHCARE Plan: [address] [place] [address] Address:  [facility] [id number], [address] [place] [id number]          Group Number: [id number] Insurance Type: INDEMNITY   Subscriber Name: Sultana Key : 1931   Subscriber ID: [id number] Pat.  Rel. to Sub: SELF           CSN: [id number]

## 2022-03-08 ENCOUNTER — ANESTHESIA (OUTPATIENT)
Dept: OPERATING ROOM | Age: 87
DRG: 482 | End: 2022-03-08
Payer: MEDICARE

## 2022-03-08 ENCOUNTER — APPOINTMENT (OUTPATIENT)
Dept: GENERAL RADIOLOGY | Age: 87
DRG: 482 | End: 2022-03-08
Payer: MEDICARE

## 2022-03-08 ENCOUNTER — ANESTHESIA EVENT (OUTPATIENT)
Dept: OPERATING ROOM | Age: 87
DRG: 482 | End: 2022-03-08
Payer: MEDICARE

## 2022-03-08 PROBLEM — S72.144A CLOSED NONDISPLACED INTERTROCHANTERIC FRACTURE OF RIGHT FEMUR (HCC): Status: ACTIVE | Noted: 2022-03-07

## 2022-03-08 LAB
ALBUMIN SERPL-MCNC: 3.7 G/DL (ref 3.5–5.2)
ALP BLD-CCNC: 42 U/L (ref 35–104)
ALT SERPL-CCNC: 14 U/L (ref 0–32)
ANION GAP SERPL CALCULATED.3IONS-SCNC: 13 MMOL/L (ref 7–16)
ANION GAP SERPL CALCULATED.3IONS-SCNC: 7 MMOL/L (ref 7–16)
APTT: 28.7 SEC (ref 24.5–35.1)
AST SERPL-CCNC: 14 U/L (ref 0–31)
BASOPHILS ABSOLUTE: 0.05 E9/L (ref 0–0.2)
BASOPHILS RELATIVE PERCENT: 0.7 % (ref 0–2)
BILIRUB SERPL-MCNC: 0.3 MG/DL (ref 0–1.2)
BUN BLDV-MCNC: 19 MG/DL (ref 6–23)
BUN BLDV-MCNC: 19 MG/DL (ref 6–23)
CALCIUM SERPL-MCNC: 8.7 MG/DL (ref 8.6–10.2)
CALCIUM SERPL-MCNC: 9 MG/DL (ref 8.6–10.2)
CHLORIDE BLD-SCNC: 103 MMOL/L (ref 98–107)
CHLORIDE BLD-SCNC: 104 MMOL/L (ref 98–107)
CO2: 23 MMOL/L (ref 22–29)
CO2: 28 MMOL/L (ref 22–29)
CREAT SERPL-MCNC: 0.8 MG/DL (ref 0.5–1)
CREAT SERPL-MCNC: 0.9 MG/DL (ref 0.5–1)
EOSINOPHILS ABSOLUTE: 0.04 E9/L (ref 0.05–0.5)
EOSINOPHILS RELATIVE PERCENT: 0.5 % (ref 0–6)
GFR AFRICAN AMERICAN: >60
GFR AFRICAN AMERICAN: >60
GFR NON-AFRICAN AMERICAN: 59 ML/MIN/1.73
GFR NON-AFRICAN AMERICAN: >60 ML/MIN/1.73
GLUCOSE BLD-MCNC: 183 MG/DL (ref 74–99)
GLUCOSE BLD-MCNC: 192 MG/DL (ref 74–99)
HCT VFR BLD CALC: 33.3 % (ref 34–48)
HEMOGLOBIN: 10.3 G/DL (ref 11.5–15.5)
IMMATURE GRANULOCYTES #: 0.04 E9/L
IMMATURE GRANULOCYTES %: 0.5 % (ref 0–5)
LYMPHOCYTES ABSOLUTE: 0.52 E9/L (ref 1.5–4)
LYMPHOCYTES RELATIVE PERCENT: 6.8 % (ref 20–42)
MCH RBC QN AUTO: 26.8 PG (ref 26–35)
MCHC RBC AUTO-ENTMCNC: 30.9 % (ref 32–34.5)
MCV RBC AUTO: 86.7 FL (ref 80–99.9)
MONOCYTES ABSOLUTE: 0.55 E9/L (ref 0.1–0.95)
MONOCYTES RELATIVE PERCENT: 7.2 % (ref 2–12)
NEUTROPHILS ABSOLUTE: 6.47 E9/L (ref 1.8–7.3)
NEUTROPHILS RELATIVE PERCENT: 84.3 % (ref 43–80)
OVALOCYTES: ABNORMAL
PDW BLD-RTO: 16.7 FL (ref 11.5–15)
PLATELET # BLD: 205 E9/L (ref 130–450)
PMV BLD AUTO: 9.3 FL (ref 7–12)
POIKILOCYTES: ABNORMAL
POTASSIUM REFLEX MAGNESIUM: 4 MMOL/L (ref 3.5–5)
POTASSIUM SERPL-SCNC: 4 MMOL/L (ref 3.5–5)
RBC # BLD: 3.84 E12/L (ref 3.5–5.5)
SODIUM BLD-SCNC: 139 MMOL/L (ref 132–146)
SODIUM BLD-SCNC: 139 MMOL/L (ref 132–146)
TOTAL PROTEIN: 6.5 G/DL (ref 6.4–8.3)
TROPONIN, HIGH SENSITIVITY: 51 NG/L (ref 0–9)
TROPONIN, HIGH SENSITIVITY: 67 NG/L (ref 0–9)
WBC # BLD: 7.7 E9/L (ref 4.5–11.5)

## 2022-03-08 PROCEDURE — 80053 COMPREHEN METABOLIC PANEL: CPT

## 2022-03-08 PROCEDURE — APPSS60 APP SPLIT SHARED TIME 46-60 MINUTES: Performed by: PHYSICIAN ASSISTANT

## 2022-03-08 PROCEDURE — 2580000003 HC RX 258: Performed by: NURSE PRACTITIONER

## 2022-03-08 PROCEDURE — 6360000002 HC RX W HCPCS: Performed by: NURSE PRACTITIONER

## 2022-03-08 PROCEDURE — 84484 ASSAY OF TROPONIN QUANT: CPT

## 2022-03-08 PROCEDURE — 85025 COMPLETE CBC W/AUTO DIFF WBC: CPT

## 2022-03-08 PROCEDURE — 99221 1ST HOSP IP/OBS SF/LOW 40: CPT | Performed by: ORTHOPAEDIC SURGERY

## 2022-03-08 PROCEDURE — 1200000000 HC SEMI PRIVATE

## 2022-03-08 PROCEDURE — 85730 THROMBOPLASTIN TIME PARTIAL: CPT

## 2022-03-08 PROCEDURE — 6370000000 HC RX 637 (ALT 250 FOR IP): Performed by: NURSE PRACTITIONER

## 2022-03-08 PROCEDURE — 80048 BASIC METABOLIC PNL TOTAL CA: CPT

## 2022-03-08 PROCEDURE — 71045 X-RAY EXAM CHEST 1 VIEW: CPT

## 2022-03-08 PROCEDURE — 36415 COLL VENOUS BLD VENIPUNCTURE: CPT

## 2022-03-08 PROCEDURE — 6370000000 HC RX 637 (ALT 250 FOR IP): Performed by: PHYSICAL THERAPY ASSISTANT

## 2022-03-08 PROCEDURE — 99223 1ST HOSP IP/OBS HIGH 75: CPT | Performed by: INTERNAL MEDICINE

## 2022-03-08 RX ORDER — MEPERIDINE HYDROCHLORIDE 25 MG/ML
12.5 INJECTION INTRAMUSCULAR; INTRAVENOUS; SUBCUTANEOUS
Status: DISCONTINUED | OUTPATIENT
Start: 2022-03-08 | End: 2022-03-08 | Stop reason: HOSPADM

## 2022-03-08 RX ORDER — SODIUM CHLORIDE 9 MG/ML
25 INJECTION, SOLUTION INTRAVENOUS PRN
Status: DISCONTINUED | OUTPATIENT
Start: 2022-03-08 | End: 2022-03-08 | Stop reason: HOSPADM

## 2022-03-08 RX ORDER — GABAPENTIN 300 MG/1
300 CAPSULE ORAL 3 TIMES DAILY
Status: DISCONTINUED | OUTPATIENT
Start: 2022-03-08 | End: 2022-03-08

## 2022-03-08 RX ORDER — GABAPENTIN 300 MG/1
300 CAPSULE ORAL 3 TIMES DAILY
Status: DISCONTINUED | OUTPATIENT
Start: 2022-03-08 | End: 2022-03-11 | Stop reason: HOSPADM

## 2022-03-08 RX ORDER — SODIUM CHLORIDE 0.9 % (FLUSH) 0.9 %
5-40 SYRINGE (ML) INJECTION PRN
Status: DISCONTINUED | OUTPATIENT
Start: 2022-03-08 | End: 2022-03-08 | Stop reason: HOSPADM

## 2022-03-08 RX ORDER — ONDANSETRON 2 MG/ML
4 INJECTION INTRAMUSCULAR; INTRAVENOUS
Status: DISCONTINUED | OUTPATIENT
Start: 2022-03-08 | End: 2022-03-08 | Stop reason: HOSPADM

## 2022-03-08 RX ORDER — SODIUM CHLORIDE 0.9 % (FLUSH) 0.9 %
5-40 SYRINGE (ML) INJECTION EVERY 12 HOURS SCHEDULED
Status: DISCONTINUED | OUTPATIENT
Start: 2022-03-08 | End: 2022-03-08 | Stop reason: HOSPADM

## 2022-03-08 RX ORDER — SODIUM CHLORIDE 9 MG/ML
INJECTION, SOLUTION INTRAVENOUS CONTINUOUS
Status: DISCONTINUED | OUTPATIENT
Start: 2022-03-08 | End: 2022-03-11 | Stop reason: HOSPADM

## 2022-03-08 RX ADMIN — MORPHINE SULFATE 2 MG: 2 INJECTION, SOLUTION INTRAMUSCULAR; INTRAVENOUS at 19:15

## 2022-03-08 RX ADMIN — GABAPENTIN 300 MG: 300 CAPSULE ORAL at 17:22

## 2022-03-08 RX ADMIN — MORPHINE SULFATE 2 MG: 2 INJECTION, SOLUTION INTRAMUSCULAR; INTRAVENOUS at 00:55

## 2022-03-08 RX ADMIN — ACETAMINOPHEN 650 MG: 325 TABLET ORAL at 17:09

## 2022-03-08 RX ADMIN — MORPHINE SULFATE 1 MG: 1 INJECTION, SOLUTION EPIDURAL; INTRATHECAL; INTRAVENOUS at 11:52

## 2022-03-08 RX ADMIN — SODIUM CHLORIDE, PRESERVATIVE FREE 10 ML: 5 INJECTION INTRAVENOUS at 00:56

## 2022-03-08 ASSESSMENT — PAIN DESCRIPTION - ORIENTATION
ORIENTATION: RIGHT

## 2022-03-08 ASSESSMENT — PAIN DESCRIPTION - DESCRIPTORS
DESCRIPTORS: ACHING;DISCOMFORT;CONSTANT
DESCRIPTORS: ACHING;CONSTANT;DISCOMFORT
DESCRIPTORS: ACHING;CONSTANT;DISCOMFORT

## 2022-03-08 ASSESSMENT — PAIN - FUNCTIONAL ASSESSMENT: PAIN_FUNCTIONAL_ASSESSMENT: ACTIVITIES ARE NOT PREVENTED

## 2022-03-08 ASSESSMENT — PAIN DESCRIPTION - FREQUENCY
FREQUENCY: CONTINUOUS

## 2022-03-08 ASSESSMENT — PAIN SCALES - GENERAL
PAINLEVEL_OUTOF10: 8
PAINLEVEL_OUTOF10: 0
PAINLEVEL_OUTOF10: 8
PAINLEVEL_OUTOF10: 0
PAINLEVEL_OUTOF10: 7
PAINLEVEL_OUTOF10: 6
PAINLEVEL_OUTOF10: 3

## 2022-03-08 ASSESSMENT — PAIN DESCRIPTION - PROGRESSION: CLINICAL_PROGRESSION: NOT CHANGED

## 2022-03-08 ASSESSMENT — PAIN DESCRIPTION - LOCATION
LOCATION: HIP

## 2022-03-08 ASSESSMENT — PAIN DESCRIPTION - ONSET: ONSET: ON-GOING

## 2022-03-08 NOTE — ED PROVIDER NOTES
ATTENDING PROVIDER ATTESTATION:     Hu Rene presented to the emergency department for evaluation of Fall (SLIPPED ON FLOOR, C/O RIGHT LEG PAIN)   and was initially evaluated by the Medical Resident. See Original ED Note for H&P and ED course above. I have reviewed and discussed the case, including pertinent history (medical, surgical, family and social) and exam findings with the Medical Resident assigned to Hu Rene. I have personally performed and/or participated in the history, exam, medical decision making, and procedures and agree with all pertinent clinical information and any additional changes or corrections are noted below in my assessment and plan. I have discussed this patient in detail with the resident, and provided the instruction and education,    I have reviewed my findings and recommendations with the assigned Medical Resident, Hu Rene and members of family present at the time of disposition. I have performed a history and physical examination of this patient and directly supervised the resident caring for this patient. History of Present Illness: This patient presents to the ED for Acute mechanical fall after slipping while walking. The patient does wear stockings on her wood floor and tripped and struck her right hip. She denies sustaining other injuries with this episode. Denies any numbness or paresthesias in the lower extremities. She does have right hip pain. Makes her symptoms better or worse they are constant duration they are mild to moderate in severity.       Review of Systems:   A complete review of systems was performed and pertinent positives and negatives are stated within HPI, all other systems reviewed and are negative.    --------------------------------------------- PAST HISTORY ---------------------------------------------  Past Medical History:  has a past medical history of Hyperlipidemia, Hypertension, Osteoporosis, Spinal stenosis, and Thyroid disease. Past Surgical History:  has a past surgical history that includes hernia repair. Social History:  reports that she has quit smoking. She has never used smokeless tobacco. She reports that she does not drink alcohol and does not use drugs. Family History: family history is not on file. Unless otherwise noted, family history is non contributory    The patients home medications have been reviewed. Allergies: Patient has no known allergies. Physical Exam:  Constitutional: Appears in no distress  Head: Normocephalic, atraumatic  Eyes: Non-icteric slcera, no conjunctival injection  ENT: Moist mucous membranes,  Neck: Trachea midline, no JVD  Respiratory: Nonlabored respirations. Lungs clear to auscultation bilaterally, no wheezes, rales, or rhonchi. Cardiovascular: Regular rate. Regular rhythm. No murmurs, no gallops, no rubs. Gastrointestinal: Abdomen Soft, Non tender, Non distended. No rebound tenderness, guarding, or rigidity. Extremities: No lower extremity edema  Genitourinary: No CVA tenderness, no suprapubic tenderness  Musculoskeletal: Pain with logroll the right lower extremity, there is 2 out of 4 dorsalis pedis and posterior tibial pulses in the bilateral lower extremities  Skin: Pink, warm, dry without rash. Neurologic: Alert, symmetric facies, no aphasia    My Medical Decision Making:   Patient presents emergency department after a mechanical fall. She has significant pain with logroll but is neurovascularly intact in the bilateral lower extremities. Patient is found to have a nondisplaced right intertrochanteric fracture. She will be admitted for further evaluation management and orthopedic surgery evaluation. IMPRESSION:   1. Closed nondisplaced intertrochanteric fracture of right femur, initial encounter ([facility] [address]    2. Fall, initial encounter        I, Dr. Tristin Gonzales, am the primary provider of record.     I directly supervised any procedures performed by the resident and was present for the procedure including all critical portions of the procedure. Hayley Peters DO  Emergency Medicine    NOTE: This report was transcribed using voice recognition software.  Every effort was made to ensure accuracy; however, inadvertent computerized transcription errors may be present       Jessica Monreal DO  03/07/22 [id number]

## 2022-03-08 NOTE — ED NOTES
SBAR faxed to floor. Attempted to call report with no answer. Pt in for transport.      Bob Drake RN  03/07/22 9018

## 2022-03-08 NOTE — H&P
[facility]  History and Physical      CHIEF COMPLAINT:    Chief Complaint   Patient presents with   [address], C/O RIGHT LEG PAIN        Patient of Jonatan Cotto DO presents with: Intertrochanteric fracture of right femur, closed, initial encounter ([facility] [address]    History of Present Illness:   Patient is a 27-year-old female with past medical history of hyperlipidemia, hypertension, osteoporosis, and hypothyroidism. Patient presented to the ED with complaints of a mechanical fall and pain in her right leg. Patient is quite confused on examination found daughter for information. Daughter states patient fell 3/7/2022 in the afternoon and she fell and neighbor to have them call 911 for her. Patient does live alone. Daughter states the the patient is usually alert and oriented and is compliant with all her medications. Patient has been receiving steroid injections in her back and leg for her arthritis. ER work-up reveals right femur fracture. Patient was admitted to [place] [address] unit for further testing and treatment. REVIEW OF SYSTEMS:  Pertinent negatives are above in HPI. 10 point ROS otherwise negative.       Past Medical History:   Diagnosis Date    Hyperlipidemia     Hypertension     Osteoporosis     Spinal stenosis     Thyroid disease          Past Surgical History:   Procedure Laterality Date    HERNIA REPAIR         Medications Prior to Admission:    Medications Prior to Admission: tiZANidine (ZANAFLEX) 2 MG tablet, Take 1 tablet by mouth nightly as needed (left leg pain)  ondansetron (ZOFRAN) 4 MG tablet, Take 1 tablet by mouth 3 times daily as needed for Nausea or Vomiting  albuterol sulfate HFA (VENTOLIN HFA) 108 (90 Base) MCG/ACT inhaler, Inhale 2 puffs into the lungs 4 times daily as needed for Wheezing  valsartan (DIOVAN) 160 MG tablet, Take 160 mg by mouth daily  pantoprazole (PROTONIX) 40 MG tablet, Take 1 tablet by mouth every morning (before breakfast)  amphetamine-dextroamphetamine (ADDERALL XR) 10 MG extended release capsule, take 1 capsule by mouth once daily  SYNTHROID 100 MCG tablet, Take 100 mcg by mouth Takes 2 days per week Saturday and Sunday  Coenzyme Q10 (COQ10 PO), Take 1 capsule by mouth daily  B Complex Vitamins (VITAMIN B COMPLEX PO), Take 1 capsule by mouth daily  Cholecalciferol (VITAMIN D3) 12472 units CAPS, Take 10,000 Units by mouth daily  levothyroxine (SYNTHROID) 75 MCG tablet, Take 75 mcg by mouth Daily  HYDROcodone-acetaminophen (NORCO) 5-325 MG per tablet, Take 1 tablet by mouth 4 times daily as needed for Pain. .    Note that the patient's home medications were reviewed and the above list is accurate to the best of my knowledge at the time of the exam.    Allergies:    Patient has no known allergies. Social History:    reports that she has quit smoking. She has never used smokeless tobacco. She reports that she does not drink alcohol and does not use drugs. Family History:   Unable to obtain at this time      PHYSICAL EXAM:    Vitals:  BP (!) 102/55   Pulse 91   Temp 98.8 °F (37.1 °C) (Temporal)   Resp 16   Ht 5' 2\" (1.575 m)   SpO2 92%   BMI 21.58 kg/m²       General appearance: NAD, conversant, confused   Eyes: Sclerae anicteric, PERRLA  HEENT: AT/NC, MMM  Neck: FROM, supple, no thyromegaly  Lymph: No cervical / supraclavicular lymphadenopathy  Lungs: Clear to auscultation, WOB normal  CV: RRR, no MRGs, no lower extremity edema  Abdomen: Soft, non-tender; no masses or HSM, +BS  Extremities: Right lower extremity shortened and externally rotated. No clubbing or cyanosis of the hands. Skin: no rash, induration, lesions, or ulcers  Psych: Calm and cooperative. Confused  Neuro: Pleasantly confused not alert to self. LABS:  All labs reviewed.   Of note:  CBC with Differential:    Lab Results   Component Value Date    WBC 7.7 03/08/2022    RBC 3.84 03/08/2022    HGB 10.3 03/08/2022    HCT 33.3 03/08/2022     03/08/2022    MCV 86.7 03/08/2022    MCH 26.8 03/08/2022    MCHC 30.9 03/08/2022    RDW 16.7 03/08/2022    LYMPHOPCT 6.8 03/08/2022    MONOPCT 7.2 03/08/2022    BASOPCT 0.7 03/08/2022    MONOSABS 0.55 03/08/2022    LYMPHSABS 0.52 03/08/2022    EOSABS 0.04 03/08/2022    BASOSABS 0.05 03/08/2022     CMP:    Lab Results   Component Value Date     03/08/2022    K 4.0 03/08/2022    K 4.0 03/08/2022     03/08/2022    CO2 28 03/08/2022    BUN 19 03/08/2022    CREATININE 0.9 03/08/2022    GFRAA >60 03/08/2022    LABGLOM 59 03/08/2022    GLUCOSE 183 03/08/2022    PROT 6.5 03/08/2022    LABALBU 3.7 03/08/2022    CALCIUM 9.0 03/08/2022    BILITOT 0.3 03/08/2022    ALKPHOS 42 03/08/2022    AST 14 03/08/2022    ALT 14 03/08/2022       Imaging:  X-ray right femur: Nondisplaced intertrochanteric fracture at the right femur. Osteoarthritis of the right hip and right knee. CXR: Elevation right hemidiaphragm. Large hiatus hernia containing stomach and colon. EKG:  NSR with possible inferior infarct      ASSESSMENT/PLAN:  Principal Problem:    Intertrochanteric fracture of right femur, closed, initial encounter ([facility] [address]  Active Problems:    Gastroesophageal reflux disease without esophagitis    Primary hypertension    Hypothyroidism    Fall  Resolved Problems:    * No resolved hospital problems. *    66-year-old female with past medical history of hypertension and osteoporosis admitted to [place] [address] unit with    Intertrochanteric fracture of the right femur  Pain control  IV hydration  Keep patient n.p.o. until cleared by orthopedic surgery  Consult orthopedic surgery -plan for ORIF  Monitor labs. Hgb A1C -     Medication for other comorbidities continue as appropriate dose adjustment as necessary. DVT prophylaxis - PCD's  PT OT  Discharge planning  Case discussed with attending and agreed upon plan of care.     Code status: Full  Requires inpatient level of care  JAXSON Salas CNP    8:40 AM  3/8/2022 Above note edited to reflect my thoughts     On my evaluation, patient is alert awake oriented x3 and answering all questions appropriately  She denies any chest pain or shortness of breath   From a medicine standpoint she is okay to proceed with orthopedic surgery however anesthesia would like cardiology clearance prior to proceeding-  Minimal elevation of troponin-not really telling of much especially because we do not have the second 1 to compare  Await cardiology input    I personally saw, examined and provided care for the patient. Radiographs, labs and medication list were reviewed by me independently. The case was discussed in detail and plans for care were established. Review of Aleksandra PURI- CNP, documentation was conducted and revisions were made as appropriate directly by me. I agree with the above documented exam, problem list, and plan of care.      Siva Wilson MD  1:57 PM  3/8/2022

## 2022-03-08 NOTE — CONSULTS
Department of Orthopedic Surgery  Resident Consult Note          Reason for Consult: Right hip pain after fall    HISTORY OF PRESENT ILLNESS:       Patient is a 80 y.o. female past medical history of hypertension hyperlipidemia who presents with right hip pain after fall. Per chart review patient was wearing socks on a slippery surface when she fell onto her right side. She subsequently presented to [facility] for further evaluation and management. Edema the patient in the room this morning she is disoriented and has difficulty giving an adequate history and participating in physical exam.  Majority of the history was obtained through chart review. Is not currently on any blood thinners. Denies numbness/tingling/paresthesias. Denies any other orthopedic complaints at this time.       Past Medical History:        Diagnosis Date    Hyperlipidemia     Hypertension     Osteoporosis     Spinal stenosis     Thyroid disease      Past Surgical History:        Procedure Laterality Date    HERNIA REPAIR       Current Medications:   Current Facility-Administered Medications: 0.9 % sodium chloride infusion, , IntraVENous, Continuous  sodium chloride flush 0.9 % injection 5-40 mL, 5-40 mL, IntraVENous, 2 times per day  sodium chloride flush 0.9 % injection 5-40 mL, 5-40 mL, IntraVENous, PRN  0.9 % sodium chloride infusion, 25 mL, IntraVENous, PRN  enoxaparin (LOVENOX) injection 40 mg, 40 mg, SubCUTAneous, Daily  acetaminophen (TYLENOL) tablet 650 mg, 650 mg, Oral, Q6H PRN **OR** acetaminophen (TYLENOL) suppository 650 mg, 650 mg, Rectal, Q6H PRN  bisacodyl (DULCOLAX) suppository 10 mg, 10 mg, Rectal, Daily PRN  prochlorperazine (COMPAZINE) injection 5 mg, 5 mg, IntraVENous, Q6H PRN  pantoprazole (PROTONIX) tablet 40 mg, 40 mg, Oral, QAM AC  valsartan (DIOVAN) tablet 160 mg, 160 mg, Oral, Daily  morphine (PF) injection 1 mg, 1 mg, IntraVENous, Q6H PRN  morphine (PF) injection 2 mg, 2 mg, IntraVENous, Q6H PRN  albuterol (PROVENTIL) nebulizer solution 2.5 mg, 2.5 mg, Nebulization, Q6H PRN  Allergies:  Patient has no known allergies. Social History:   TOBACCO:   reports that she has quit smoking. She has never used smokeless tobacco.  ETOH:   reports no history of alcohol use. DRUGS:   reports no history of drug use. ACTIVITIES OF DAILY LIVING:    OCCUPATION:    Family History:   History reviewed. No pertinent family history. REVIEW OF SYSTEMS:  CONSTITUTIONAL:  negative for  fevers, chills  EYES:  negative for acute vision changes  HEENT:  negative for cough, hearing loss  RESPIRATORY:  negative for  dyspnea, wheezing  CARDIOVASCULAR:  negative for  chest pain  GASTROINTESTINAL:  negative for nausea, vomiting  GENITOURINARY:  negative for frequency, urinary incontinence  HEMATOLOGIC/LYMPHATIC:  negative for bleeding  MUSCULOSKELETAL:  positive for right hip pain  NEUROLOGICAL:  negative for headaches, dizziness  BEHAVIOR/PSYCH:  negative for increased agitation and anxiety    PHYSICAL EXAM:    VITALS:  BP (!) 148/82   Pulse 88   Temp 98 °F (36.7 °C) (Temporal)   Resp 16   Ht 5' 2\" (1.575 m)   SpO2 97%   BMI 21.58 kg/m²   CONSTITUTIONAL: Sleeping, awakens to name, alert and oriented to self, does not adequately participate with physical exam  MUSCULOSKELETAL:  Right lower Extremity:  · Skin examination reveals no superficial lacerations or abrasions. Mild ecchymosis noted over the right hip. Patient rest with her left hip flexed while her right hip is extended. · TTP over the right hip, mentions palpation over the thigh, knee, shin, ankle, foot, toes  · Pain with logroll  · Sensation unable to be adequately assessed due to patient status  · Motor function unable to be adequately assessed due to patient status  · Compartment soft and compressible  · +2/4 DP and PT pulses, foot warm and well-perfused    Secondary Exam:   · bilateralUE: No obvious signs of trauma.   -TTP to fingers, hand, wrist, forearm, elbow, humerus, shoulder or clavicle. · leftLE: No obvious signs of trauma. -TTP to foot, ankle, leg, knee, thigh, hip. · Pelvis: -TTP, -Log roll and  -Heel strike on the left     DATA:    CBC:   Lab Results   Component Value Date    WBC 7.7 03/08/2022    RBC 3.84 03/08/2022    HGB 10.3 03/08/2022    HCT 33.3 03/08/2022    MCV 86.7 03/08/2022    MCH 26.8 03/08/2022    MCHC 30.9 03/08/2022    RDW 16.7 03/08/2022     03/08/2022    MPV 9.3 03/08/2022     PT/INR:    Lab Results   Component Value Date    PROTIME 11.6 03/07/2022    INR 1.0 03/07/2022       Radiology Review:  XR AP pelvis and 2 views of the right hip reviewed demonstrating a nondisplaced intertrochanteric femur fracture on the right. No other acute fracture dislocations noted osteopenia noted. No other bony or soft tissue abnormalities noted. 4 views of the right femur reviewed demonstrating the nondisplaced intertrochanteric femur fracture as above. No other acute fracture dislocations noted. No other bony or soft tissue abnormalities noted. IMPRESSION:  · Closed, right intertrochanteric femur fracture    PLAN:  · Nonweightbearing right lower extremity  · Pain control per primary  · Antibiotics preoperatively for surgical prophylaxis  · Hold anticoagulants  · N.p.o. now  · Medical clearance  · Perioperative medical optimization  · Completion of preop labs and imaging  · Plan for open reduction internal fixation of right intertrochanteric femur fracture  · All patient/family questions have been answered and patient is currently agreeable to plan.   · Discuss with Attending      Electronically signed by Erin Brito DO on 3/8/2022 at 6:29 AM

## 2022-03-08 NOTE — PROGRESS NOTES
Ortho consult received. Imaging reviewed, demonstrating a non displaced Right intertrochanteric femur fx.     NWB RLE  NPO  Hold anticoags  Multimodal pain control    Formal consult to follow     Electronically signed by Xena Quinteros DO on 3/8/2022 at 5:45 AM

## 2022-03-08 NOTE — H&P
Department of Orthopedic Surgery  Resident Consult Note          Reason for Consult: Right hip pain after fall    HISTORY OF PRESENT ILLNESS:       Patient is a 80 y.o. female past medical history of hypertension hyperlipidemia who presents with right hip pain after fall. Per chart review patient was wearing socks on a slippery surface when she fell onto her right side. She subsequently presented to [facility] for further evaluation and management. Edema the patient in the room this morning she is disoriented and has difficulty giving an adequate history and participating in physical exam.  Majority of the history was obtained through chart review. Is not currently on any blood thinners. Denies numbness/tingling/paresthesias. Denies any other orthopedic complaints at this time.       Past Medical History:        Diagnosis Date    Hyperlipidemia     Hypertension     Osteoporosis     Spinal stenosis     Thyroid disease      Past Surgical History:        Procedure Laterality Date    HERNIA REPAIR       Current Medications:   Current Facility-Administered Medications: 0.9 % sodium chloride infusion, , IntraVENous, Continuous  sodium chloride flush 0.9 % injection 5-40 mL, 5-40 mL, IntraVENous, 2 times per day  sodium chloride flush 0.9 % injection 5-40 mL, 5-40 mL, IntraVENous, PRN  0.9 % sodium chloride infusion, 25 mL, IntraVENous, PRN  enoxaparin (LOVENOX) injection 40 mg, 40 mg, SubCUTAneous, Daily  acetaminophen (TYLENOL) tablet 650 mg, 650 mg, Oral, Q6H PRN **OR** acetaminophen (TYLENOL) suppository 650 mg, 650 mg, Rectal, Q6H PRN  bisacodyl (DULCOLAX) suppository 10 mg, 10 mg, Rectal, Daily PRN  prochlorperazine (COMPAZINE) injection 5 mg, 5 mg, IntraVENous, Q6H PRN  pantoprazole (PROTONIX) tablet 40 mg, 40 mg, Oral, QAM AC  valsartan (DIOVAN) tablet 160 mg, 160 mg, Oral, Daily  morphine (PF) injection 1 mg, 1 mg, IntraVENous, Q6H PRN  morphine (PF) injection 2 mg, 2 mg, IntraVENous, Q6H PRN  albuterol (PROVENTIL) nebulizer solution 2.5 mg, 2.5 mg, Nebulization, Q6H PRN  Allergies:  Patient has no known allergies. Social History:   TOBACCO:   reports that she has quit smoking. She has never used smokeless tobacco.  ETOH:   reports no history of alcohol use. DRUGS:   reports no history of drug use. ACTIVITIES OF DAILY LIVING:    OCCUPATION:    Family History:   History reviewed. No pertinent family history. REVIEW OF SYSTEMS:  CONSTITUTIONAL:  negative for  fevers, chills  EYES:  negative for acute vision changes  HEENT:  negative for cough, hearing loss  RESPIRATORY:  negative for  dyspnea, wheezing  CARDIOVASCULAR:  negative for  chest pain  GASTROINTESTINAL:  negative for nausea, vomiting  GENITOURINARY:  negative for frequency, urinary incontinence  HEMATOLOGIC/LYMPHATIC:  negative for bleeding  MUSCULOSKELETAL:  positive for right hip pain  NEUROLOGICAL:  negative for headaches, dizziness  BEHAVIOR/PSYCH:  negative for increased agitation and anxiety    PHYSICAL EXAM:    VITALS:  BP (!) 102/55   Pulse 91   Temp 98.8 °F (37.1 °C) (Temporal)   Resp 16   Ht 5' 2\" (1.575 m)   SpO2 92%   BMI 21.58 kg/m²   CONSTITUTIONAL: Sleeping, awakens to name, alert and oriented to self, does not adequately participate with physical exam  Physical Exam  Constitutional:       Appearance: She is not ill-appearing. HENT:      Head: Normocephalic and atraumatic. Eyes:      Extraocular Movements: Extraocular movements intact. Pupils: Pupils are equal, round, and reactive to light. Cardiovascular:      Rate and Rhythm: Normal rate and regular rhythm. Pulses: Normal pulses. Pulmonary:      Effort: Pulmonary effort is normal. No respiratory distress. Abdominal:      General: Abdomen is flat. There is no distension. Palpations: Abdomen is soft. Musculoskeletal:      Cervical back: Neck supple. No tenderness. Skin:     General: Skin is warm and dry.       Capillary Refill: Capillary refill takes less than 2 seconds. Neurological:      General: No focal deficit present. Mental Status: She is alert. MUSCULOSKELETAL:  Right lower Extremity:  · Skin examination reveals no superficial lacerations or abrasions. Mild ecchymosis noted over the right hip. Patient rest with her left hip flexed while her right hip is extended. · TTP over the right hip, mentions palpation over the thigh, knee, shin, ankle, foot, toes  · Pain with logroll  · Sensation unable to be adequately assessed due to patient status  · Motor function unable to be adequately assessed due to patient status  · Compartment soft and compressible  · +2/4 DP and PT pulses, foot warm and well-perfused    Secondary Exam:   · bilateralUE: No obvious signs of trauma. -TTP to fingers, hand, wrist, forearm, elbow, humerus, shoulder or clavicle. · leftLE: No obvious signs of trauma. -TTP to foot, ankle, leg, knee, thigh, hip. · Pelvis: -TTP, -Log roll and  -Heel strike on the left     DATA:    CBC:   Lab Results   Component Value Date    WBC 7.7 03/08/2022    RBC 3.84 03/08/2022    HGB 10.3 03/08/2022    HCT 33.3 03/08/2022    MCV 86.7 03/08/2022    MCH 26.8 03/08/2022    MCHC 30.9 03/08/2022    RDW 16.7 03/08/2022     03/08/2022    MPV 9.3 03/08/2022     PT/INR:    Lab Results   Component Value Date    PROTIME 11.6 03/07/2022    INR 1.0 03/07/2022       Radiology Review:  XR AP pelvis and 2 views of the right hip reviewed demonstrating a nondisplaced intertrochanteric femur fracture on the right. No other acute fracture dislocations noted osteopenia noted. No other bony or soft tissue abnormalities noted. 4 views of the right femur reviewed demonstrating the nondisplaced intertrochanteric femur fracture as above. No other acute fracture dislocations noted. No other bony or soft tissue abnormalities noted.     IMPRESSION:  · Closed, right intertrochanteric femur fracture    PLAN:  · Nonweightbearing right lower extremity  · Pain control per primary  · Antibiotics preoperatively for surgical prophylaxis  · Hold anticoagulants  · N.p.o. now  · Medical clearance  · Perioperative medical optimization  · Completion of preop labs and imaging  · Plan for open reduction internal fixation of right intertrochanteric femur fracture  · All patient/family questions have been answered and patient is currently agreeable to plan.   · Discuss with Attending      Electronically signed by Yunior Escobedo DO on 3/8/2022 at 8:58 AM

## 2022-03-08 NOTE — PROGRESS NOTES
Pharmacy Note    Horace Aj was ordered Vitamin B complex. As per the [address] [place] [address], herbals and certain dietary supplements will be discontinued.   The herbal or dietary supplement may be continued after discharge from the hospital.    Francisco McraeD, [facility], BCPS 3/7/2022 11:27 PM

## 2022-03-08 NOTE — PROGRESS NOTES
OCCUPATIONAL THERAPY TREATMENT NOTE    [facility] TREATMENT NOTE      Date:3/8/2022  Patient Name: Oswaldo Davison  MRN: [id number]  : 1931  Room: [address] [place] [address]     OT orders received & appreciated, chart reviewed. Will hold d/t pending OR this day. Will follow post-op in accordance with Ortho orders/POC/recommendations. Thank you,  Lauryn De Dios.  Cira OTR/L   License #  [id number]

## 2022-03-08 NOTE — PROGRESS NOTES
Cardiology consult needed to proceed with surgery per anesthesia request. Will plan for OR 3/9 am, awaiting formal consult. Ok for diet, NPO at midnight. Hold anticoags. D/w attending.      Vitals:    03/08/22 0730   BP: (!) 102/55   Pulse: 91   Resp: 16   Temp: 98.8 °F (37.1 °C)   SpO2: 92%     Electronically signed by Basia Cisneros DO on 3/8/2022 at 2:25 PM

## 2022-03-08 NOTE — CONSULTS
Inpatient [facility]iology Consultation      Reason for Consult: Cardiac risk stratification    Consulting Physician: Dr. Keyana Murcia    Requesting Physician: Dr. Enriquez Mayor    Date of Consultation: 3/8/2022    HISTORY OF PRESENT ILLNESS:   Patient is a 80year old WF not previously known to [facility]. She is being seen in consultation this hospital admission by Dr. Keyana Murcia for cardiac risk stratification. PMH: HTN, former tobacco smoker, vitamin D deficiency, hypothyroidism on replacement therapy and osteoporosis. Denies history of CAD, MI, CHF. Denies prior LHC. Patient [facility] to [facility] on March 7, 2022 after a mechanical fall resulting in right hip and right leg pain. · Of note, patient is alert and oriented to person and place, but seems confused at times and is in severe pain during my time on the floor. No family present at bedside. Per patient's nurse, she was initially A+O x 3 on admission, however she has been noted to be mildly confused since being given IV morphine. · Per patient, she lives in an apartment alone and does not require assistance with ambulation. She states she still drives, and can complete her ADLs, as well as walk up a flight of stairs without angina or anginal equivalent symptoms. · She states the day of hospital presentation, she slipped and fell at home onto her right leg and right hip. She denies chest pain, SOB, dizziness, palpitations, near syncope or syncope. She notes of her \"socks being slippery\" --> resulting in her falling onto the ground. She immediately noticed right hip and leg pain, and EMS was called. · Plan by orthopedic surgery is for ORIF, with anesthesia team requesting cardiac risk stratification prior to. Please note: past medical records were reviewed per electronic medical record (EMR) - see detailed reports under Past Medical/ Surgical History. PAST MEDICAL HISTORY:    · HTN.  · GERD. · Vitamin D deficiency.   · Hypothyroidism, on levothyroxine (SYNTHROID) 75 MCG tablet Take 75 mcg by mouth Daily    Historical Provider, MD   HYDROcodone-acetaminophen (NORCO) 5-325 MG per tablet Take 1 tablet by mouth 4 times daily as needed for Pain. Justice Gil     Historical Provider, MD       CURRENT MEDICATIONS:      Current Facility-Administered Medications:     ceFAZolin (ANCEF) 2000 mg in sterile water 20 mL IV syringe, 2,000 mg, IntraVENous, On Call to OR, Welda Milla, DO    0.9 % sodium chloride infusion, , IntraVENous, Continuous, Welda Milla, DO    sodium chloride flush 0.9 % injection 5-40 mL, 5-40 mL, IntraVENous, 2 times per day, April KAZ AveryN - CNP, 10 mL at 03/08/22 0056    sodium chloride flush 0.9 % injection 5-40 mL, 5-40 mL, IntraVENous, PRN, April KAZ AveryN - CNP    0.9 % sodium chloride infusion, 25 mL, IntraVENous, PRN, April KAZ AveryN - CNP    enoxaparin (LOVENOX) injection 40 mg, 40 mg, SubCUTAneous, Daily, April KAZ AveryN - CNP    acetaminophen (TYLENOL) tablet 650 mg, 650 mg, Oral, Q6H PRN **OR** acetaminophen (TYLENOL) suppository 650 mg, 650 mg, Rectal, Q6H PRN, April KAZ AveryN - CNP    bisacodyl (DULCOLAX) suppository 10 mg, 10 mg, Rectal, Daily PRN, April JAXSON Avery - NERY    prochlorperazine (COMPAZINE) injection 5 mg, 5 mg, IntraVENous, Q6H PRN, April Bennie APRN - CNP    pantoprazole (PROTONIX) tablet 40 mg, 40 mg, Oral, QAM AC, April KAZ AveryN - CNP    valsartan (DIOVAN) tablet 160 mg, 160 mg, Oral, Daily, April JAXSON Avery - CNP    morphine (PF) injection 1 mg, 1 mg, IntraVENous, Q6H PRN, April JAXSON Avery - CNP, 1 mg at 03/08/22 1152    morphine (PF) injection 2 mg, 2 mg, IntraVENous, Q6H PRN, JAXSON Fitzpatrick - CNP, 2 mg at 03/08/22 0055    albuterol (PROVENTIL) nebulizer solution 2.5 mg, 2.5 mg, Nebulization, Q6H PRN, Herminia Posey MD      ALLERGIES:  Patient has no known Purple DH (Discharge Huddle; Vulnerable Patient) allergies. SOCIAL HISTORY:    Lives alone in an apartment. Denied using assistance with ambulation. Prior tobacco abuse, does not provider further details. FAMILY HISTORY:   Non-contributory at this time due to patient's advanced age. REVIEW OF SYSTEMS:     Negative except as noted above in HPI. PHYSICAL EXAM:   BP (!) 102/55   Pulse 91   Temp 98.8 °F (37.1 °C) (Temporal)   Resp 16   Ht 5' 2\" (1.575 m)   Wt 126 lb 9 oz (57.4 kg)   SpO2 92%   BMI 23.15 kg/m²   CONST:  Well developed, frail elderly WF who appears stated age. Awake, alert, cooperative but confused. HEENT:   Head- Normocephalic, atraumatic. Eyes- Conjunctivae pink, anicteric. Neck-  No stridor, trachea midline, no apparent jugular venous distention. CHEST: Chest symmetrical and non-tender to palpation. No accessory muscle use or intercostal retractions. RESPIRATORY: Lung sounds - clear throughout fields. Diminished. CARDIOVASCULAR:     No noted carotid bruit. Heart Ausculation- Regular rate and rhythm, soft systolic murmur RUSB. PV: No lower extremity edema. No clubbing or cyanosis. ABDOMEN: Soft, non-tender to light palpation. Bowel sounds present. SKIN: Warm and dry. NEURO / PSYCH: Oriented to person, place. Speech clear and appropriate. Confused at times. DATA:      Diagnostic:  All diagnostic testing and lab work thus far this admission reviewed in detail. Right Femur XRAY 3/7/2022:  Impression:  Nondisplaced intertrochanteric fracture at the right femur.  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 Right Hip XRAY 3/7/2022:  Impression:  Nondisplaced intertrochanteric fracture at the right femur.  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 CXR 3/7/2022:  Impression:  Elevation right hemidiaphragm. No acute pulmonary or cardiac abnormalities.     CXR 3/8/2022:  Impression:  Worsening airspace disease on the right with stable atelectasis and or  infiltrate on the left.  Large hiatus hernia containing stomach and colon. Intake/Output Summary (Last 24 hours) at 3/8/2022 1413  Last data filed at 3/8/2022 1152  Gross per 24 hour   Intake 0 ml   Output --   Net 0 ml       Labs:   CBC:   Recent Labs     03/07/22 1945 03/08/22  0535   WBC 10.7 7.7   HGB 11.8 10.3*   HCT 37.4 33.3*    205     BMP:   Recent Labs     03/08/22  0535 03/08/22  0639    139   K 4.0 4.0   CO2 23 28   BUN 19 19   CREATININE 0.8 0.9   LABGLOM >60 59   CALCIUM 8.7 9.0     PT/INR:   Recent Labs     03/07/22 1945   PROTIME 11.6   INR 1.0     APTT:  Recent Labs     03/07/22 1945 03/08/22  0639   APTT 28.7 28.7     LIVER PROFILE:  Recent Labs     03/07/22 1945 03/08/22  0639   AST 20 14   ALT 19 14   LABALBU 4.2 3.7      Ref Range & Units 03/08/22 1108   Troponin, High Sensitivity 0 - 9 ng/L 51 High             Assessment and plan to follow as per Dr. Ranjana Quintero. NOTE: This report was transcribed using voice recognition software. Every effort was made to ensure accuracy; however, inadvertent computerized transcription errors may be present. Cecelia Buckley, [address] [place] [address] Cardiology    Electronically signed by Deysi Simpson PA-C on 3/8/2022 at 2:13 PM      I have personally seen and evaluated the patient. I personally obtained the history and performed the physical exam.  I personally reviewed all of the above labs, history, review of systems, and data. All of the assessments and recommendations are from me. All of the above cardiac medical decisions are from me. Please see my additional contributions to the history, physical exam, assessment, and recommendations below. History of chief complaint:  She was lying flat in bed. Her only complaint is her hip and leg pain. She denies any chest pain or shortness of breath. She is admitted because she slipped on a pair of fuzzy socks, fell, and resulted in an intertrochanteric femur fracture.   Cardiology is consulted for preop cardiac risk stratification. She states that she lives alone, she does household chores, she goes shopping, and she climbs 1 flight of stairs. She denies any chest discomfort or dyspnea with any of these activities. Review of systems:     Heart: as above   Lungs: as above   Eyes: denies changes in vision or discharge. Ears: denies changes in hearing or pain. Nose: denies epistaxis or masses   Throat: denies sore throat or trouble swallowing. Neuro: denies numbness, tingling, tremors. Skin: denies rashes or itching. : denies hematuria, dysuria   GI: denies vomiting, diarrhea   Psych: denies mood changed, anxiety, depression. Physical exam:  BP (!) 140/61   Pulse 80   Temp 99.6 °F (37.6 °C) (Temporal)   Resp 20   Ht 5' 2\" (1.575 m)   Wt 126 lb 9 oz (57.4 kg)   SpO2 91%   BMI 23.15 kg/m²   Constitutional: A&O x3, communicates well, no acute distress. Eyes: extraocular muscles intact, PERRL. Normal lids & conjunctiva. No icterus. ENT: clear, no bleeding. No external masses. Lips normal formation. Neck: supple, full ROM, no JVD, no bruits, no lymphadenopathy. No masses. trachea midline. Heart: regular rate & rhythm, normal S1 & S2, no abnormal murmurs. No heave. Lungs: CTA. No accessory muscles. Abd: soft, non-tender. Normal bowel sounds. Neuro: Full ROM X 4, EOMI, no tremors. EXT: No bilateral lower extremity edema  Skin: warm, dry, intact. Good turgor. Psych: A&O x 3, normal behavior, not anxious. Patient seen and examined. Chart, labs & data reviewed. A:  1. Borderline elevated troponin in the setting of the above. She denies any cardiac symptoms. 2. Fall with intertrochanteric fracture requiring ORIF. Not an elective procedure. 3. Hypertension, well controlled. 4. GERD. 5. Hypothyroidism. Remote former smoker. 6. She performs close to 4 METS of physical activities with no cardiac symptoms.   She had a stress test 12-19 which did not show any ischemia or myocardial infarction and a normal ejection fraction. However, she does have very advanced age and some risk factors. This combination would place her at low to intermediate risk for perioperative ischemic cardiac events. Rec:  1. Low to intermediate risk for perioperative ischemic cardiac events. No active cardiac symptoms. Her heart rate and blood pressure are well controlled. This is not an elective procedure and must be performed even if she was felt to be high risk. Therefore, I would not recommend any additional cardiac testing preoperatively. I would recommend careful monitoring fluids, heart rates, and blood pressure. 2. I spent a great deal of time discussing this in detail with her daughter and her. They understand. Electronically signed by Stuart Ndiaye DO on 3/8/2022 at 8:51 PM    Note: This report was completed using computerized voice recognition software. Every effort has been made to ensure accuracy, however; and invert and computerized transcription errors may be present.

## 2022-03-08 NOTE — PROGRESS NOTES
Physical Therapy    Date: 3/8/2022       Patient Name: Sukhdeep Alanis  : 1931      MRN: [id number]    PT order received. Chart has been reviewed. PT evaluation will be on hold due to possible OR 3/8/22. Will continue to follow and complete evaluation at later time.      Hayley Martinez, PT

## 2022-03-08 NOTE — PROGRESS NOTES
Patient refused meds this morning and refused boucher, stating, \"just let me be\". Patient seems more confused this morning and unable to sign treatment consent at this time.

## 2022-03-08 NOTE — CARE COORDINATION
3/8/2022 social work transition of care planning  Sw attempted to meet with pt, observed to be slightly confused. Sw spoke with Pt's son Pancho Mercer vis phone [phone]. Pt is from home alone and had been independent. Pt  Pcp is Dr. Yessy Mccloud. Explained sw role in transition of care planning. Pt for surgery today. Sw emailed skilled nursing list to pt's son at [email or web address]. Sw will leave a list in the room. The Plan for Transition of Care is related to the following treatment goals: improvement of functioning    The Patient and/or patient representative  was provided with a choice of provider and agrees   with the discharge plan. [x] Yes [] No    Freedom of choice list was provided with basic dialogue that supports the patient's individualized plan of care/goals, treatment preferences and shares the quality data associated with the providers.  [x] Yes [] No  Electronically signed by JOSE GUADALUPE Arias on 3/8/2022 at 2:23 PM

## 2022-03-09 ENCOUNTER — APPOINTMENT (OUTPATIENT)
Dept: GENERAL RADIOLOGY | Age: 87
DRG: 482 | End: 2022-03-09
Payer: MEDICARE

## 2022-03-09 VITALS
RESPIRATION RATE: 4 BRPM | OXYGEN SATURATION: 86 % | DIASTOLIC BLOOD PRESSURE: 87 MMHG | SYSTOLIC BLOOD PRESSURE: 182 MMHG

## 2022-03-09 LAB
ALBUMIN SERPL-MCNC: 3.3 G/DL (ref 3.5–5.2)
ALP BLD-CCNC: 44 U/L (ref 35–104)
ALT SERPL-CCNC: 56 U/L (ref 0–32)
ANION GAP SERPL CALCULATED.3IONS-SCNC: 7 MMOL/L (ref 7–16)
AST SERPL-CCNC: 54 U/L (ref 0–31)
BASOPHILS ABSOLUTE: 0.02 E9/L (ref 0–0.2)
BASOPHILS RELATIVE PERCENT: 0.2 % (ref 0–2)
BILIRUB SERPL-MCNC: 0.2 MG/DL (ref 0–1.2)
BUN BLDV-MCNC: 19 MG/DL (ref 6–23)
CALCIUM SERPL-MCNC: 8.1 MG/DL (ref 8.6–10.2)
CHLORIDE BLD-SCNC: 107 MMOL/L (ref 98–107)
CO2: 24 MMOL/L (ref 22–29)
CREAT SERPL-MCNC: 0.6 MG/DL (ref 0.5–1)
EOSINOPHILS ABSOLUTE: 0.01 E9/L (ref 0.05–0.5)
EOSINOPHILS RELATIVE PERCENT: 0.1 % (ref 0–6)
GFR AFRICAN AMERICAN: >60
GFR NON-AFRICAN AMERICAN: >60 ML/MIN/1.73
GLUCOSE BLD-MCNC: 225 MG/DL (ref 74–99)
HBA1C MFR BLD: 5.9 % (ref 4–5.6)
HCT VFR BLD CALC: 33.4 % (ref 34–48)
HEMOGLOBIN: 10.1 G/DL (ref 11.5–15.5)
HYPOCHROMIA: ABNORMAL
IMMATURE GRANULOCYTES #: 0.06 E9/L
IMMATURE GRANULOCYTES %: 0.7 % (ref 0–5)
LYMPHOCYTES ABSOLUTE: 0.32 E9/L (ref 1.5–4)
LYMPHOCYTES RELATIVE PERCENT: 3.6 % (ref 20–42)
MCH RBC QN AUTO: 26.9 PG (ref 26–35)
MCHC RBC AUTO-ENTMCNC: 30.2 % (ref 32–34.5)
MCV RBC AUTO: 88.8 FL (ref 80–99.9)
MONOCYTES ABSOLUTE: 0.46 E9/L (ref 0.1–0.95)
MONOCYTES RELATIVE PERCENT: 5.2 % (ref 2–12)
NEUTROPHILS ABSOLUTE: 8.06 E9/L (ref 1.8–7.3)
NEUTROPHILS RELATIVE PERCENT: 90.2 % (ref 43–80)
PDW BLD-RTO: 16.6 FL (ref 11.5–15)
PLATELET # BLD: 175 E9/L (ref 130–450)
PMV BLD AUTO: 9.1 FL (ref 7–12)
POLYCHROMASIA: ABNORMAL
POTASSIUM SERPL-SCNC: 4.4 MMOL/L (ref 3.5–5)
PROCALCITONIN: 0.08 NG/ML (ref 0–0.08)
RBC # BLD: 3.76 E12/L (ref 3.5–5.5)
SODIUM BLD-SCNC: 138 MMOL/L (ref 132–146)
TOTAL PROTEIN: 6 G/DL (ref 6.4–8.3)
WBC # BLD: 8.9 E9/L (ref 4.5–11.5)

## 2022-03-09 PROCEDURE — 2580000003 HC RX 258: Performed by: STUDENT IN AN ORGANIZED HEALTH CARE EDUCATION/TRAINING PROGRAM

## 2022-03-09 PROCEDURE — 6370000000 HC RX 637 (ALT 250 FOR IP): Performed by: STUDENT IN AN ORGANIZED HEALTH CARE EDUCATION/TRAINING PROGRAM

## 2022-03-09 PROCEDURE — 84145 PROCALCITONIN (PCT): CPT

## 2022-03-09 PROCEDURE — 2500000003 HC RX 250 WO HCPCS: Performed by: ORTHOPAEDIC SURGERY

## 2022-03-09 PROCEDURE — 3700000001 HC ADD 15 MINUTES (ANESTHESIA): Performed by: ORTHOPAEDIC SURGERY

## 2022-03-09 PROCEDURE — 3209999900 FLUORO FOR SURGICAL PROCEDURES

## 2022-03-09 PROCEDURE — C1769 GUIDE WIRE: HCPCS | Performed by: ORTHOPAEDIC SURGERY

## 2022-03-09 PROCEDURE — 6360000002 HC RX W HCPCS: Performed by: STUDENT IN AN ORGANIZED HEALTH CARE EDUCATION/TRAINING PROGRAM

## 2022-03-09 PROCEDURE — 80053 COMPREHEN METABOLIC PANEL: CPT

## 2022-03-09 PROCEDURE — 3600000005 HC SURGERY LEVEL 5 BASE: Performed by: ORTHOPAEDIC SURGERY

## 2022-03-09 PROCEDURE — 36415 COLL VENOUS BLD VENIPUNCTURE: CPT

## 2022-03-09 PROCEDURE — 2700000000 HC OXYGEN THERAPY PER DAY

## 2022-03-09 PROCEDURE — 2709999900 HC NON-CHARGEABLE SUPPLY: Performed by: ORTHOPAEDIC SURGERY

## 2022-03-09 PROCEDURE — 2580000003 HC RX 258: Performed by: ORTHOPAEDIC SURGERY

## 2022-03-09 PROCEDURE — 83036 HEMOGLOBIN GLYCOSYLATED A1C: CPT

## 2022-03-09 PROCEDURE — 27245 TREAT THIGH FRACTURE: CPT | Performed by: ORTHOPAEDIC SURGERY

## 2022-03-09 PROCEDURE — 6360000002 HC RX W HCPCS: Performed by: NURSE ANESTHETIST, CERTIFIED REGISTERED

## 2022-03-09 PROCEDURE — 7100000000 HC PACU RECOVERY - FIRST 15 MIN: Performed by: ORTHOPAEDIC SURGERY

## 2022-03-09 PROCEDURE — 2500000003 HC RX 250 WO HCPCS: Performed by: STUDENT IN AN ORGANIZED HEALTH CARE EDUCATION/TRAINING PROGRAM

## 2022-03-09 PROCEDURE — 2500000003 HC RX 250 WO HCPCS: Performed by: NURSE ANESTHETIST, CERTIFIED REGISTERED

## 2022-03-09 PROCEDURE — 7100000001 HC PACU RECOVERY - ADDTL 15 MIN: Performed by: ORTHOPAEDIC SURGERY

## 2022-03-09 PROCEDURE — 2580000003 HC RX 258: Performed by: NURSE PRACTITIONER

## 2022-03-09 PROCEDURE — 1200000000 HC SEMI PRIVATE

## 2022-03-09 PROCEDURE — 0QS604Z REPOSITION RIGHT UPPER FEMUR WITH INTERNAL FIXATION DEVICE, OPEN APPROACH: ICD-10-PCS | Performed by: ORTHOPAEDIC SURGERY

## 2022-03-09 PROCEDURE — 3600000015 HC SURGERY LEVEL 5 ADDTL 15MIN: Performed by: ORTHOPAEDIC SURGERY

## 2022-03-09 PROCEDURE — 3700000000 HC ANESTHESIA ATTENDED CARE: Performed by: ORTHOPAEDIC SURGERY

## 2022-03-09 PROCEDURE — 85025 COMPLETE CBC W/AUTO DIFF WBC: CPT

## 2022-03-09 PROCEDURE — C1713 ANCHOR/SCREW BN/BN,TIS/BN: HCPCS | Performed by: ORTHOPAEDIC SURGERY

## 2022-03-09 PROCEDURE — 73502 X-RAY EXAM HIP UNI 2-3 VIEWS: CPT

## 2022-03-09 DEVICE — SCREW BNE L38MM DIA4.5MM PROX CORT TIB S STL ST LOK FULL: Type: IMPLANTABLE DEVICE | Site: FEMUR | Status: FUNCTIONAL

## 2022-03-09 DEVICE — IMPLANTABLE DEVICE: Type: IMPLANTABLE DEVICE | Site: FEMUR | Status: FUNCTIONAL

## 2022-03-09 DEVICE — PLATE BNE L62MM BLDE W5.8XL38MM 130DEG 3 H ST BILAT PELV: Type: IMPLANTABLE DEVICE | Site: FEMUR | Status: FUNCTIONAL

## 2022-03-09 RX ORDER — ONDANSETRON 2 MG/ML
4 INJECTION INTRAMUSCULAR; INTRAVENOUS EVERY 6 HOURS PRN
Status: DISCONTINUED | OUTPATIENT
Start: 2022-03-09 | End: 2022-03-11 | Stop reason: HOSPADM

## 2022-03-09 RX ORDER — MORPHINE SULFATE 2 MG/ML
2 INJECTION, SOLUTION INTRAMUSCULAR; INTRAVENOUS EVERY 5 MIN PRN
Status: DISCONTINUED | OUTPATIENT
Start: 2022-03-09 | End: 2022-03-09 | Stop reason: HOSPADM

## 2022-03-09 RX ORDER — SODIUM CHLORIDE 0.9 % (FLUSH) 0.9 %
5-40 SYRINGE (ML) INJECTION PRN
Status: DISCONTINUED | OUTPATIENT
Start: 2022-03-09 | End: 2022-03-09 | Stop reason: SDUPTHER

## 2022-03-09 RX ORDER — OXYCODONE HYDROCHLORIDE AND ACETAMINOPHEN 5; 325 MG/1; MG/1
1 TABLET ORAL EVERY 6 HOURS PRN
Qty: 28 TABLET | Refills: 0 | Status: SHIPPED | OUTPATIENT
Start: 2022-03-09 | End: 2022-03-16

## 2022-03-09 RX ORDER — FENTANYL CITRATE 50 UG/ML
INJECTION, SOLUTION INTRAMUSCULAR; INTRAVENOUS PRN
Status: DISCONTINUED | OUTPATIENT
Start: 2022-03-09 | End: 2022-03-09 | Stop reason: SDUPTHER

## 2022-03-09 RX ORDER — ROCURONIUM BROMIDE 10 MG/ML
INJECTION, SOLUTION INTRAVENOUS PRN
Status: DISCONTINUED | OUTPATIENT
Start: 2022-03-09 | End: 2022-03-09 | Stop reason: SDUPTHER

## 2022-03-09 RX ORDER — SODIUM CHLORIDE 0.9 % (FLUSH) 0.9 %
5-40 SYRINGE (ML) INJECTION EVERY 12 HOURS SCHEDULED
Status: DISCONTINUED | OUTPATIENT
Start: 2022-03-09 | End: 2022-03-09 | Stop reason: HOSPADM

## 2022-03-09 RX ORDER — LIDOCAINE HYDROCHLORIDE 20 MG/ML
INJECTION, SOLUTION INTRAVENOUS PRN
Status: DISCONTINUED | OUTPATIENT
Start: 2022-03-09 | End: 2022-03-09 | Stop reason: SDUPTHER

## 2022-03-09 RX ORDER — ESMOLOL HYDROCHLORIDE 10 MG/ML
INJECTION INTRAVENOUS PRN
Status: DISCONTINUED | OUTPATIENT
Start: 2022-03-09 | End: 2022-03-09 | Stop reason: SDUPTHER

## 2022-03-09 RX ORDER — SODIUM CHLORIDE 9 MG/ML
25 INJECTION, SOLUTION INTRAVENOUS PRN
Status: DISCONTINUED | OUTPATIENT
Start: 2022-03-09 | End: 2022-03-09 | Stop reason: SDUPTHER

## 2022-03-09 RX ORDER — SODIUM CHLORIDE 0.9 % (FLUSH) 0.9 %
5-40 SYRINGE (ML) INJECTION EVERY 12 HOURS SCHEDULED
Status: DISCONTINUED | OUTPATIENT
Start: 2022-03-09 | End: 2022-03-09 | Stop reason: SDUPTHER

## 2022-03-09 RX ORDER — OXYCODONE HYDROCHLORIDE 5 MG/1
5 TABLET ORAL EVERY 4 HOURS PRN
Status: DISCONTINUED | OUTPATIENT
Start: 2022-03-09 | End: 2022-03-11 | Stop reason: HOSPADM

## 2022-03-09 RX ORDER — GLYCOPYRROLATE 1 MG/5 ML
SYRINGE (ML) INTRAVENOUS PRN
Status: DISCONTINUED | OUTPATIENT
Start: 2022-03-09 | End: 2022-03-09 | Stop reason: SDUPTHER

## 2022-03-09 RX ORDER — SODIUM CHLORIDE 9 MG/ML
25 INJECTION, SOLUTION INTRAVENOUS PRN
Status: DISCONTINUED | OUTPATIENT
Start: 2022-03-09 | End: 2022-03-09 | Stop reason: HOSPADM

## 2022-03-09 RX ORDER — PROPOFOL 10 MG/ML
INJECTION, EMULSION INTRAVENOUS PRN
Status: DISCONTINUED | OUTPATIENT
Start: 2022-03-09 | End: 2022-03-09 | Stop reason: SDUPTHER

## 2022-03-09 RX ORDER — MORPHINE SULFATE 2 MG/ML
1 INJECTION, SOLUTION INTRAMUSCULAR; INTRAVENOUS EVERY 5 MIN PRN
Status: DISCONTINUED | OUTPATIENT
Start: 2022-03-09 | End: 2022-03-09 | Stop reason: HOSPADM

## 2022-03-09 RX ORDER — ONDANSETRON 4 MG/1
4 TABLET, ORALLY DISINTEGRATING ORAL EVERY 8 HOURS PRN
Status: DISCONTINUED | OUTPATIENT
Start: 2022-03-09 | End: 2022-03-11 | Stop reason: HOSPADM

## 2022-03-09 RX ORDER — SENNA PLUS 8.6 MG/1
1 TABLET ORAL DAILY PRN
Status: DISCONTINUED | OUTPATIENT
Start: 2022-03-09 | End: 2022-03-11 | Stop reason: HOSPADM

## 2022-03-09 RX ORDER — ASPIRIN 325 MG
325 TABLET, DELAYED RELEASE (ENTERIC COATED) ORAL 2 TIMES DAILY
Qty: 56 TABLET | Refills: 0 | Status: SHIPPED | OUTPATIENT
Start: 2022-03-09 | End: 2022-04-06

## 2022-03-09 RX ORDER — MEPERIDINE HYDROCHLORIDE 25 MG/ML
12.5 INJECTION INTRAMUSCULAR; INTRAVENOUS; SUBCUTANEOUS EVERY 5 MIN PRN
Status: DISCONTINUED | OUTPATIENT
Start: 2022-03-09 | End: 2022-03-09 | Stop reason: HOSPADM

## 2022-03-09 RX ORDER — EPHEDRINE SULFATE/0.9% NACL/PF 50 MG/5 ML
SYRINGE (ML) INTRAVENOUS PRN
Status: DISCONTINUED | OUTPATIENT
Start: 2022-03-09 | End: 2022-03-09 | Stop reason: SDUPTHER

## 2022-03-09 RX ORDER — SODIUM CHLORIDE 0.9 % (FLUSH) 0.9 %
5-40 SYRINGE (ML) INJECTION PRN
Status: DISCONTINUED | OUTPATIENT
Start: 2022-03-09 | End: 2022-03-09 | Stop reason: HOSPADM

## 2022-03-09 RX ADMIN — GABAPENTIN 300 MG: 300 CAPSULE ORAL at 21:55

## 2022-03-09 RX ADMIN — ROCURONIUM BROMIDE 30 MG: 10 SOLUTION INTRAVENOUS at 07:58

## 2022-03-09 RX ADMIN — ACETAMINOPHEN 650 MG: 325 TABLET ORAL at 21:55

## 2022-03-09 RX ADMIN — TRANEXAMIC ACID 1000 MG: 1 INJECTION, SOLUTION INTRAVENOUS at 11:12

## 2022-03-09 RX ADMIN — FENTANYL CITRATE 100 MCG: 50 INJECTION, SOLUTION INTRAMUSCULAR; INTRAVENOUS at 07:57

## 2022-03-09 RX ADMIN — TRANEXAMIC ACID 1000 MG: 1 INJECTION, SOLUTION INTRAVENOUS at 08:31

## 2022-03-09 RX ADMIN — ESMOLOL HYDROCHLORIDE 30 MG: 10 INJECTION, SOLUTION INTRAVENOUS at 08:47

## 2022-03-09 RX ADMIN — PROPOFOL 150 MG: 10 INJECTION, EMULSION INTRAVENOUS at 07:57

## 2022-03-09 RX ADMIN — Medication 2000 MG: at 08:10

## 2022-03-09 RX ADMIN — SODIUM CHLORIDE: 9 INJECTION, SOLUTION INTRAVENOUS at 00:00

## 2022-03-09 RX ADMIN — SODIUM CHLORIDE: 9 INJECTION, SOLUTION INTRAVENOUS at 07:15

## 2022-03-09 RX ADMIN — GABAPENTIN 300 MG: 300 CAPSULE ORAL at 12:25

## 2022-03-09 RX ADMIN — Medication 2000 MG: at 16:45

## 2022-03-09 RX ADMIN — Medication 0.2 MG: at 07:58

## 2022-03-09 RX ADMIN — ROCURONIUM BROMIDE 20 MG: 10 SOLUTION INTRAVENOUS at 08:46

## 2022-03-09 RX ADMIN — LIDOCAINE HYDROCHLORIDE 60 MG: 20 INJECTION, SOLUTION INTRAVENOUS at 07:57

## 2022-03-09 RX ADMIN — SENNA 8.6 MG: 8.6 TABLET, COATED ORAL at 21:56

## 2022-03-09 RX ADMIN — ENOXAPARIN SODIUM 40 MG: 100 INJECTION SUBCUTANEOUS at 21:55

## 2022-03-09 RX ADMIN — SODIUM CHLORIDE, PRESERVATIVE FREE 10 ML: 5 INJECTION INTRAVENOUS at 00:00

## 2022-03-09 RX ADMIN — Medication 10 MG: at 08:34

## 2022-03-09 RX ADMIN — ESMOLOL HYDROCHLORIDE 30 MG: 10 INJECTION, SOLUTION INTRAVENOUS at 09:02

## 2022-03-09 ASSESSMENT — PULMONARY FUNCTION TESTS
PIF_VALUE: 23
PIF_VALUE: 13
PIF_VALUE: 18
PIF_VALUE: 21
PIF_VALUE: 0
PIF_VALUE: 19
PIF_VALUE: 22
PIF_VALUE: 22
PIF_VALUE: 21
PIF_VALUE: 23
PIF_VALUE: 1
PIF_VALUE: 21
PIF_VALUE: 19
PIF_VALUE: 23
PIF_VALUE: 41
PIF_VALUE: 21
PIF_VALUE: 22
PIF_VALUE: 24
PIF_VALUE: 4
PIF_VALUE: 21
PIF_VALUE: 21
PIF_VALUE: 20
PIF_VALUE: 23
PIF_VALUE: 21
PIF_VALUE: 21
PIF_VALUE: 10
PIF_VALUE: 20
PIF_VALUE: 0
PIF_VALUE: 20
PIF_VALUE: 22
PIF_VALUE: 5
PIF_VALUE: 23
PIF_VALUE: 10
PIF_VALUE: 3
PIF_VALUE: 24
PIF_VALUE: 22
PIF_VALUE: 2
PIF_VALUE: 21
PIF_VALUE: 10
PIF_VALUE: 10
PIF_VALUE: 21
PIF_VALUE: 23
PIF_VALUE: 21
PIF_VALUE: 24
PIF_VALUE: 21
PIF_VALUE: 10
PIF_VALUE: 22
PIF_VALUE: 22
PIF_VALUE: 10
PIF_VALUE: 24
PIF_VALUE: 23
PIF_VALUE: 20
PIF_VALUE: 10
PIF_VALUE: 23
PIF_VALUE: 20
PIF_VALUE: 0
PIF_VALUE: 22
PIF_VALUE: 19
PIF_VALUE: 23
PIF_VALUE: 10
PIF_VALUE: 22
PIF_VALUE: 21
PIF_VALUE: 22
PIF_VALUE: 21
PIF_VALUE: 22
PIF_VALUE: 10
PIF_VALUE: 22
PIF_VALUE: 23
PIF_VALUE: 1
PIF_VALUE: 22
PIF_VALUE: 22
PIF_VALUE: 23
PIF_VALUE: 22
PIF_VALUE: 21
PIF_VALUE: 20
PIF_VALUE: 2
PIF_VALUE: 18
PIF_VALUE: 25
PIF_VALUE: 21
PIF_VALUE: 21
PIF_VALUE: 10
PIF_VALUE: 22
PIF_VALUE: 0
PIF_VALUE: 21
PIF_VALUE: 20
PIF_VALUE: 0
PIF_VALUE: 21
PIF_VALUE: 22
PIF_VALUE: 1
PIF_VALUE: 22

## 2022-03-09 ASSESSMENT — PAIN SCALES - GENERAL
PAINLEVEL_OUTOF10: 0
PAINLEVEL_OUTOF10: 0
PAINLEVEL_OUTOF10: 4
PAINLEVEL_OUTOF10: 0
PAINLEVEL_OUTOF10: 0
PAINLEVEL_OUTOF10: 4
PAINLEVEL_OUTOF10: 0

## 2022-03-09 NOTE — CARE COORDINATION
3/9/2022 social work transition of care planning  Sw spoke with pt's dtr(Carmelita [phone]) today,receive teri choices:1)  [facility] 2) [address] 3) [facility] 4) [facility]. Referral to first two choices. Sw will follow. t is vaccinated(two in [facility] and had booster,per Chato Perea). Electronically signed by JOSE GUADALUPE Hunt on 3/9/2022 at 1:04 PM     Addendum:Sw notified that pt is accepted at [facility]. Will need therapy, covid day of discharge and 7000 completed at discharge. Envelope lory be in soft chart.   Electronically signed by JOSE GUADALUPE Hunt on 3/9/2022 at 2:04 PM

## 2022-03-09 NOTE — PROGRESS NOTES
Physical Therapy  Name: [place] [address]  Room: 6421/0757-S  Date: 03/09/22    PT brody on hold, awaiting OR intervention with ortho.      Yenni Montero, PT, DPT  [id number]

## 2022-03-09 NOTE — ANESTHESIA POSTPROCEDURE EVALUATION
Department of Anesthesiology  Postprocedure Note    Patient: Madhu Jesus  MRN: [id number]  YOB: 1931  Date of evaluation: 3/9/2022  Time:  11:03 AM     Procedure Summary     Date: 03/09/22 Room / Location: [place] [address] / [facility]    Anesthesia Start:  Anesthesia Stop:     Procedure: RIGHT INTERTROCHANTERIC FEMUR FRACTURE  OPEN REDUCTION INTERNAL FIXATION WITH PHS---SYNTHES (Right ) Diagnosis: ([address])    Surgeons: Telma Awad MD Responsible Provider:     Anesthesia Type: general ASA Status: 2          Anesthesia Type: general    Brisa Phase I: Brisa Score: 9    Brisa Phase II:      Last vitals: Reviewed and per EMR flowsheets.        Anesthesia Post Evaluation    Patient location during evaluation: PACU  Patient participation: complete - patient participated  Level of consciousness: awake  Pain score: 3  Airway patency: patent  Nausea & Vomiting: no nausea and no vomiting  Complications: no  Cardiovascular status: blood pressure returned to baseline  Respiratory status: acceptable  Hydration status: euvolemic

## 2022-03-09 NOTE — BRIEF OP NOTE
Brief Postoperative Note      Patient: Sean Godoy  YOB: 1931  MRN: [id number]    Date of Procedure: 3/9/2022    Pre-Op Diagnosis: FOLLOW SONTIC    Post-Op Diagnosis: Same       Procedure(s):  RIGHT INTERTROCHANTERIC FEMUR FRACTURE  OPEN REDUCTION INTERNAL FIXATION WITH PHS---SYNTHES    Surgeon(s):  Jesi Dolan MD    Assistant:  Resident: Maricruz Westbrook DO; Diamond John DO    Anesthesia: General    Estimated Blood Loss (mL): less than 416     Complications: None    Specimens:   * No specimens in log *    Implants:  Implant Name Type Inv. Item Serial No.  Lot No. LRB No. Used Action   SCREW BNE L38MM DIA4. 5MM PROX ULISES TIB S STL ST BLAS FULL - [id number]  SCREW BNE L38MM DIA4. 5MM PROX ULISES TIB S STL ST BLAS FULL  DEPUY SYNTHES [employer]WD  Right 3 Implanted   SCREW BNE L85MM DIA12.7MM CANC S STL LAG OCTAGONAL RECESS 1 - [id number]  SCREW BNE L85MM DIA12.7MM CANC S STL LAG OCTAGONAL RECESS 1  DEPUY SYNTHES [employer]WD  Right 1 Implanted   PLATE BNE Q81XS BLDE [id number] 130DEG 3 H ST BILAT PELV - [id number]  PLATE BNE [id number] BLDE [id number] 130DEG 3 H ST BILAT PELV  DEPUY SYNTHES [employer]WD  Right 1 Implanted         Drains: * No LDAs found *    Findings: see op note    Electronically signed by Maricruz Westbrook DO on 3/9/2022 at 8:58 AM

## 2022-03-09 NOTE — DISCHARGE INSTR - COC
Continuity of Care Form    Patient Name: aPul Browning   :  1931  MRN:  [id number]    Admit date:  3/7/2022  Discharge date: 22      Code Status Order: Full Code   Advance Directives:   [address] [facility] Documentation       Date/Time Healthcare Directive Type of Healthcare Directive Copy in [facility] [address] Agent's Name Healthcare Agent's Phone Number    22 [phone] No, patient does not have an advance directive for healthcare treatment -- -- -- -- --            Admitting Physician:  Georgie Peabody, MD  PCP: Barak Hatfield DO    Discharging Nurse: [facility] Unit/Room#: 7680/5494-N  Discharging Unit Phone Number: ***    Emergency Contact:   Extended Emergency Contact Information  Primary Emergency Contact: [address] Phone: [phone]  Relation: Child  Secondary Emergency Contact: [address] [facility] Phone: [phone]  Mobile Phone: [phone]  Relation: Child   needed? No    Past Surgical History:  Past Surgical History:   Procedure Laterality Date    HERNIA REPAIR         Immunization History: There is no immunization history on file for this patient. Active Problems:  Patient Active Problem List   Diagnosis Code    Near syncope R55    Other chronic pain G89.29    Chest pain R07.9    Hypertension I10    Hyperlipidemia E78.5    Nausea R11.0    Closed nondisplaced intertrochanteric fracture of right femur (HCC) S72.144A    Gastroesophageal reflux disease without esophagitis K21.9    Primary hypertension I10    Hypothyroidism E03.9    Fall W19. Felicity Mopauls       Isolation/Infection:   Isolation            No Isolation          Patient Infection Status       Infection Onset Added Last Indicated Last Indicated By Review Planned Expiration Resolved Resolved By    None active    Resolved    COVID-19 (Rule Out) 20 COVID-19 (Ordered)   20 Rule-Out Test Resulted            Nurse Assessment:  Last Vital Signs: BP (!) 110/51   Pulse 79   Temp 98.1 °F (36.7 °C) (Temporal)   Resp 20   Ht 5' 2\" (1.575 m)   Wt 126 lb 9 oz (57.4 kg)   SpO2 95%   BMI 23.15 kg/m²     Last documented pain score (0-10 scale): Pain Level: 0  Last Weight:   Wt Readings from Last 1 Encounters:   03/08/22 126 lb 9 oz (57.4 kg)     Mental Status:  oriented and alert    IV Access:  - None    Nursing Mobility/ADLs:  Walking   Assisted  Transfer  Assisted  Bathing  Assisted  Dressing  Assisted  Toileting  Assisted  Feeding  Independent  Med Admin  Independent  Med Delivery   whole    Wound Care Documentation and Therapy:        Elimination:  Continence: Bowel: Yes  Bladder: Yes  Urinary Catheter: None   Colostomy/Ileostomy/Ileal Conduit: No       Date of Last BM: ***    Intake/Output Summary (Last 24 hours) at 3/9/2022 1405  Last data filed at 3/9/2022 0934  Gross per 24 hour   Intake 1510 ml   Output 275 ml   Net 1235 ml     I/O last 3 completed shifts: In: 10 [I.V.:10]  Out: -     Safety Concerns:     History of Falls (last 30 days) and At Risk for Falls    Impairments/Disabilities:      None    Nutrition Therapy:  Current Nutrition Therapy:   - Oral Diet:  General    Routes of Feeding: Oral  Liquids: No Restrictions  Daily Fluid Restriction: no  Last Modified Barium Swallow with Video (Video Swallowing Test): not done    Treatments at the Time of Hospital Discharge:   Respiratory Treatments: ***  Oxygen Therapy:  is on oxygen at 4.5 L/min per nasal cannula.   Ventilator:    - No ventilator support    Rehab Therapies: Physical Therapy and Occupational Therapy  Weight Bearing Status/Restrictions: No weight bearing restrictions  Other Medical Equipment (for information only, NOT a DME order):  wheelchair and walker  Other Treatments: ***    Patient's personal belongings (please select all that are sent with patient):  {RUTH DME Belongings:[id number]}    RN SIGNATURE:  {Esignature:[id number]}    CASE MANAGEMENT/SOCIAL WORK SECTION    Inpatient Status Date: ***    Readmission Risk Assessment Score:  Readmission Risk              Risk of Unplanned Readmission:  14           Discharging to Facility/ Agency   Name: [facility]  Address:  Phone:  Fax:    Dialysis Facility (if applicable)   Name:  Address:  Dialysis Schedule:  Phone:  Fax:    / signature: Electronically signed by JOSE GUADALUPE Alarcon on 3/9/2022 at 2:06 PM      PHYSICIAN SECTION    Prognosis: Fair    Condition at Discharge: Stable    Rehab Potential (if transferring to Rehab): Fair    Recommended Labs or Other Treatments After Discharge: cbc and bmp in 3 days     Physician Certification: I certify the above information and transfer of Yessy Burch  is necessary for the continuing treatment of the diagnosis listed and that she requires [facility] for less 30 days.      Update Admission H&P: No change in H&P    PHYSICIAN SIGNATURE:  Electronically signed by Meri Mckenzie MD on 3/10/22 at 12:13 PM EST

## 2022-03-09 NOTE — PROGRESS NOTES
Subjective: The patient is awake and alert. No acute events overnight. Denies chest pain, angina, SOB   Currently requiring 4L NC    Objective:    BP (!) 110/51   Pulse 79   Temp 98.1 °F (36.7 °C) (Temporal)   Resp 20   Ht 5' 2\" (1.575 m)   Wt 126 lb 9 oz (57.4 kg)   SpO2 95%   BMI 23.15 kg/m²     In: 1510 [I.V.:1510]  Out: 275   In: 1510   Out: 275 [Urine:150]    General appearance: NAD, conversant  HEENT: AT/NC, MMM  Neck: FROM, supple  Lungs: Diminished   CV: RRR, no MRGs  Vasc: Radial pulses 2+  Abdomen: Soft, non-tender; no masses or HSM  Extremities: No peripheral edema or digital cyanosis, R leg surgical dressing   Skin: no rash, lesions or ulcers  Psych: Alert and oriented to person, place and time  Neuro: Alert and interactive     Recent Labs     03/07/22  1945 03/08/22  0535 03/09/22  1330   WBC 10.7 7.7 8.9   HGB 11.8 10.3* 10.1*   HCT 37.4 33.3* 33.4*    205 175       Recent Labs     03/08/22  0535 03/08/22  0639 03/09/22  1330    139 138   K 4.0 4.0 4.4    104 107   CO2 23 28 24   BUN 19 19 19   CREATININE 0.8 0.9 0.6   CALCIUM 8.7 9.0 8.1*       Assessment:    Principal Problem:    Closed nondisplaced intertrochanteric fracture of right femur (HCC)  Active Problems:    Gastroesophageal reflux disease without esophagitis    Primary hypertension    Hypothyroidism    Fall  Resolved Problems:    * No resolved hospital problems. *      Plan:    80-year-old female with past medical history of hypertension and osteoporosis admitted to [place] [address] unit with   Intertrochanteric fracture of the right femur  -Monitor labs, CBC daily   -Pain control  -IV hydration  -Consult orthopedic surgery  -R ORIF 3/9   -Monitor labs    Hypertension  -Continue home medications    Hypoxia  -Currently requiring 4L NC and prior to surgery  -Will continue to monitor, patient did smoke for many years.  -Incentive spirometry education provided to patient and son at bedside.        DVT Prophylaxis Lovenox   PT/OT  Discharge planning       JAXSON Mccartney CNP  3:09 PM  3/9/2022     Above note edited to reflect my thoughts   No chest pain, no evidence of cardiac issues  Continue to monitor postoperatively  Watch for drop in blood count. Discharge once rehab organized    I personally saw, examined and provided care for the patient. Radiographs, labs and medication list were reviewed by me independently. The case was discussed in detail and plans for care were established. Review of [facility] Mccartney   , documentation was conducted and revisions were made as appropriate directly by me. I agree with the above documented exam, problem list, and plan of care.      Lamberto Porter MD  5:22 PM  3/9/2022

## 2022-03-09 NOTE — PLAN OF CARE
Problem: Pain:  Goal: Patient's pain/discomfort is manageable  Description: Patient's pain/discomfort is manageable  Outcome: Met This Shift     Problem: Falls - Risk of:  Goal: Will remain free from falls  Description: Will remain free from falls  3/9/2022 0337 by Crissy Rizzo RN  Outcome: Met This Shift  3/8/2022 1622 by Candy Kennedy RN  Outcome: Met This Shift  Goal: Absence of physical injury  Description: Absence of physical injury  3/9/2022 0337 by Crissy Rizzo RN  Outcome: Met This Shift  3/8/2022 1622 by Candy Kennedy RN  Outcome: Met This Shift     Problem: Daily Care:  Goal: Daily care needs are met  Description: Daily care needs are met  Outcome: Met This Shift

## 2022-03-09 NOTE — PROGRESS NOTES
PATIENTS UPPER DENTURES REMOVED FROM PATIENTS MOUTH. DENTURES PLACED IN BLUE CONTAINER WITH PATIENT LABEL AND TAKEN TO PACU WITH PATIENT CHART.

## 2022-03-09 NOTE — OP NOTE
Operative Note      Patient: Jennifer Campoverde  YOB: 1931  MRN: [id number]    Date of Procedure: 3/9/2022    Pre-Op Diagnosis: Right intertrochanteric femur fracture    Post-Op Diagnosis: Same       Procedure(s):  RIGHT INTERTROCHANTERIC FEMUR FRACTURE  OPEN REDUCTION INTERNAL FIXATION WITH PHS---SYNTHES    Surgeon(s):  Sahra Carranza MD    Assistant:   Resident: Otto Beyer DO; Sharon Pena DO    Anesthesia: General    Estimated Blood Loss (mL): less than 869     Complications: None    Specimens:   * No specimens in log *    Implants:  Implant Name Type Inv. Item Serial No.  Lot No. LRB No. Used Action   SCREW BNE L38MM DIA4. 5MM PROX ULISES TIB S STL ST BLAS FULL - [id number]  SCREW BNE L38MM DIA4. 5MM PROX ULISES TIB S STL ST BLAS FULL  DEPUY [employer]NTHES [employer]  Right 3 Implanted   SCREW BNE L85MM DIA12.7MM CANC S STL LAG OCTAGONAL RECESS 1 - [id number]  SCREW BNE L85MM DIA12.7MM CANC S STL LAG OCTAGONAL RECESS 1  DEPUY [employer]NTH[employer]WD  Right 1 Implanted   PLATE BNE O73EN BLDE [id number] 130DEG 3 H ST BILAT PELV - [id number]  PLATE BNE Y52HD BLDE [id number] 130DEG 3 H ST BILAT PELV  DEPUY SYNTHES [employer]WD  Right 1 Implanted         Drains: * No LDAs found *    Findings: Right intertrochanteric femur fracture    Detailed Description of Procedure:   After the patient was identified and the proper extremity was marked in the preoperative area, the was brought to the operative suite and sedation was provided by the anesthesia team.  After the patient was adequately anesthetized the patient was transferred to the operative table and all bony prominences padded appropriately. Fluoroscopic imaging was used to identify and adequately reduce the fracture before incision was made. Once this occurred the patients right lower extremity was prepped and draped in the typical sterile fashion. Incision was made over the lateral aspect of the right thigh using the 10 blade.   It was taken down through the IT band into the myotendinous fibers of the vastus lateralis. It was then retracted anteriorly by a Horan retractor. Next a guide wire was advanced into the femoral neck and once adequate alignment had been achieved guidewire was measured at 90 mm. A reamer was then to  advanced 85 mm. 85 mm lag screw with 3 hole sliding hip screw plate was advanced until good bony purchase was achieved. Next three 38 mm x  4.5mm lag screws were advanced. A compression screw was applied until adequate bony compression been achieved. This was then removed and maintenance of adequate compression at the fracture site. Next the incision was thoroughly irrigated and the deep layer closed with 0 Vicryl sutures and subcutaneous closed with 2-0 Vicryl sutures and skin closed with staples. A sterile bandage was applied. Patient's anesthesia was then reversed by anesthesia team and safely transferred to her hospital bed was brought to PACU in stable condition. Patient will be started on Lovenox or VTE prophylaxis in the hospital transition to aspirin as an outpatient. She will be weightbearing as tolerated on the right lower extremity. She will work with physical therapy this operatively and to follow-up in orthopedic clinic in 2 weeks.       Electronically signed by Leticia Reid DO on 3/9/2022 at 9:16 AM

## 2022-03-09 NOTE — ANESTHESIA PRE PROCEDURE
Department of Anesthesiology  Preprocedure Note       Name:  Sonido Tellez   Age:  80 y.o.  :  1931                                          MRN:  [id number]         Date:  3/9/2022      Surgeon: Mauro Gallardo):  Claudette Delgado MD    Procedure: Procedure(s):  RIGHT INTERTROCHANTERIC FEMUR FRACTURE  OPEN REDUCTION INTERNAL FIXATION WITH PHS---SYNTHES    Medications prior to admission:   Prior to Admission medications    Medication Sig Start Date End Date Taking? Authorizing Provider   tiZANidine (ZANAFLEX) 2 MG tablet Take 1 tablet by mouth nightly as needed (left leg pain) 3/4/21   GARRETT Rose III   ondansetron (ZOFRAN) 4 MG tablet Take 1 tablet by mouth 3 times daily as needed for Nausea or Vomiting 20   Thaddeus Chaves MD   albuterol sulfate HFA (VENTOLIN HFA) 108 (90 Base) MCG/ACT inhaler Inhale 2 puffs into the lungs 4 times daily as needed for Wheezing 20   Enoc Gunter MD   valsartan (DIOVAN) 160 MG tablet Take 160 mg by mouth daily    Historical Provider, MD   pantoprazole (PROTONIX) 40 MG tablet Take 1 tablet by mouth every morning (before breakfast) 19   Kisha Dhillon MD   amphetamine-dextroamphetamine (ADDERALL XR) 10 MG extended release capsule take 1 capsule by mouth once daily 18   Historical Provider, MD   SYNTHROID 100 MCG tablet Take 100 mcg by mouth Takes 2 days per week Saturday and Phi 10/9/18   Historical Provider, MD   Coenzyme Q10 (COQ10 PO) Take 1 capsule by mouth daily    Historical Provider, MD   B Complex Vitamins (VITAMIN B COMPLEX PO) Take 1 capsule by mouth daily    Historical Provider, MD   Cholecalciferol (VITAMIN D3) 52964 units CAPS Take 10,000 Units by mouth daily    Historical Provider, MD   levothyroxine (SYNTHROID) 75 MCG tablet Take 75 mcg by mouth Daily    Historical Provider, MD   HYDROcodone-acetaminophen (NORCO) 5-325 MG per tablet Take 1 tablet by mouth 4 times daily as needed for Pain. Rodney Cruz     Historical Provider, MD       Current medications:    Current Facility-Administered Medications   Medication Dose Route Frequency Provider Last Rate Last Admin    0.9 % sodium chloride infusion   IntraVENous Continuous Fausto Clement,  mL/hr at 03/09/22 0000 New Bag at 03/09/22 0000    gabapentin (NEURONTIN) capsule 300 mg  300 mg Oral TID Editha Buerger L Pricilla, DO   300 mg at 03/08/22 1722    sodium chloride flush 0.9 % injection 5-40 mL  5-40 mL IntraVENous 2 times per day [facility], APRN - CNP   10 mL at 03/09/22 0000    sodium chloride flush 0.9 % injection 5-40 mL  5-40 mL IntraVENous PRN [facility], APRN - CNP        0.9 % sodium chloride infusion  25 mL IntraVENous PRN [facility], APRN - CNP        enoxaparin (LOVENOX) injection 40 mg  40 mg SubCUTAneous Daily [facility], APRN - CNP        acetaminophen (TYLENOL) tablet 650 mg  650 mg Oral Q6H PRN [facility], APRN - CNP   650 mg at 03/08/22 1709    Or    acetaminophen (TYLENOL) suppository 650 mg  650 mg Rectal Q6H PRN [facility], APRN - CNP        bisacodyl (DULCOLAX) suppository 10 mg  10 mg Rectal Daily PRN [facility], APRN - CNP        prochlorperazine (COMPAZINE) injection 5 mg  5 mg IntraVENous Q6H PRN [facility], APRN - CNP        pantoprazole (PROTONIX) tablet 40 mg  40 mg Oral QAM AC [facility], APRN - CNP        valsartan (DIOVAN) tablet 160 mg  160 mg Oral Daily April [place], APRN - CNP        morphine (PF) injection 1 mg  1 mg IntraVENous Q6H PRN [facility], APRN - CNP   1 mg at 03/08/22 1152    morphine (PF) injection 2 mg  2 mg IntraVENous Q6H PRN [facility], APRN - CNP   2 mg at 03/08/22 1915    albuterol (PROVENTIL) nebulizer solution 2.5 mg  2.5 mg Nebulization Q6H PRN Seamus Robin MD           Allergies:  No Known Allergies    Problem List:    Patient Active Problem List   Diagnosis Code    Near syncope R51    Other chronic pain G89.29    Chest pain R07.9    Hypertension I10    Hyperlipidemia E78.5    Nausea R11.0    Closed nondisplaced intertrochanteric fracture of right femur (HCC) S72.144A    Gastroesophageal reflux disease without esophagitis K21.9    Primary hypertension I10    Hypothyroidism E03.9    Fall W19. [place]       Past Medical History:        Diagnosis Date    Hyperlipidemia     Hypertension     Osteoporosis     Spinal stenosis     Thyroid disease        Past Surgical History:        Procedure Laterality Date    HERNIA REPAIR         Social History:    Social History     Tobacco Use    Smoking status: Former Smoker    Smokeless tobacco: Never Used   Substance Use Topics    Alcohol use: No                                Counseling given: Not Answered      Vital Signs (Current):   Vitals:    03/08/22 1706 03/08/22 2300 03/08/22 2310 03/09/22 0300   BP: (!) 140/61 (!) 104/53  119/68   Pulse: 80 81  77   Resp: 20 24 20 17   Temp: 99.6 °F (37.6 °C) 97.7 °F (36.5 °C)  98 °F (36.7 °C)   TempSrc: Temporal Temporal  Temporal   SpO2: 91% (!) 70% 95% 91%   Weight:       Height:                                                  BP Readings from Last 3 Encounters:   03/09/22 119/68   08/22/21 (!) 147/63   03/04/21 (!) 166/88       NPO Status: Time of last liquid consumption: 2000                        Time of last solid consumption: 2000                        Date of last liquid consumption: 03/08/22                        Date of last solid food consumption: 03/08/22    BMI:   Wt Readings from Last 3 Encounters:   03/08/22 126 lb 9 oz (57.4 kg)   08/22/21 118 lb (53.5 kg)   03/04/21 120 lb (54.4 kg)     Body mass index is 23.15 kg/m².     CBC:   Lab Results   Component Value Date    WBC 7.7 03/08/2022    RBC 3.84 03/08/2022    HGB 10.3 03/08/2022    HCT 33.3 03/08/2022    MCV 86.7 03/08/2022    RDW 16.7 03/08/2022     03/08/2022       CMP:   Lab Results   Component Value Date     03/08/2022    K 4.0 03/08/2022    K 4.0 03/08/2022     03/08/2022    CO2 28 03/08/2022    BUN 19 03/08/2022    CREATININE 0.9 03/08/2022    GFRAA >60 03/08/2022    LABGLOM 59 03/08/2022    GLUCOSE 183 03/08/2022    PROT 6.5 03/08/2022    CALCIUM 9.0 03/08/2022    BILITOT 0.3 03/08/2022    ALKPHOS 42 03/08/2022    AST 14 03/08/2022    ALT 14 03/08/2022       POC Tests: No results for input(s): POCGLU, POCNA, POCK, POCCL, POCBUN, POCHEMO, POCHCT in the last 72 hours. Coags:   Lab Results   Component Value Date    PROTIME 11.6 03/07/2022    INR 1.0 03/07/2022    APTT 28.7 03/08/2022       HCG (If Applicable): No results found for: PREGTESTUR, PREGSERUM, HCG, HCGQUANT     ABGs: No results found for: PHART, PO2ART, GND9XED, VSJ2TUL, BEART, R3JZSJAX     Type & Screen (If Applicable):  No results found for: LABABO, LABRH    Drug/Infectious Status (If Applicable):  No results found for: HIV, HEPCAB    COVID-19 Screening (If Applicable):   Lab Results   Component Value Date    COVID19 Not Detected 11/12/2020           Anesthesia Evaluation  Patient summary reviewed no history of anesthetic complications:   Airway: Mallampati: II        Dental: normal exam         Pulmonary:Negative Pulmonary ROS breath sounds clear to auscultation                             Cardiovascular:  Exercise tolerance: poor (<4 METS),   (+) hypertension:,         Rhythm: regular  Rate: normal           Beta Blocker:  Not on Beta Blocker         Neuro/Psych:   Negative Neuro/Psych ROS              GI/Hepatic/Renal:   (+) GERD:,           Endo/Other:    (+) hypothyroidism::., .          Pt had no PAT visit       Abdominal:             Vascular: negative vascular ROS. Other Findings:             Anesthesia Plan      general     ASA 2       Induction: intravenous. MIPS: Postoperative opioids intended, Prophylactic antiemetics administered and Postoperative trial extubation. Anesthetic plan and risks discussed with patient.       Plan discussed with Navdeep Bartholomew MD   3/9/2022

## 2022-03-09 NOTE — PROGRESS NOTES
Patient very somnolent and snoring but tossing and turning in bed. Patient found to have O2 in the 70's. Placed on 4L NC O2 mid 90's now.

## 2022-03-10 LAB
ALBUMIN SERPL-MCNC: 3.2 G/DL (ref 3.5–5.2)
ALP BLD-CCNC: 40 U/L (ref 35–104)
ALT SERPL-CCNC: 29 U/L (ref 0–32)
ANION GAP SERPL CALCULATED.3IONS-SCNC: 8 MMOL/L (ref 7–16)
AST SERPL-CCNC: 22 U/L (ref 0–31)
BASOPHILS ABSOLUTE: 0.04 E9/L (ref 0–0.2)
BASOPHILS RELATIVE PERCENT: 0.4 % (ref 0–2)
BILIRUB SERPL-MCNC: <0.2 MG/DL (ref 0–1.2)
BUN BLDV-MCNC: 15 MG/DL (ref 6–23)
CALCIUM SERPL-MCNC: 8.3 MG/DL (ref 8.6–10.2)
CHLORIDE BLD-SCNC: 106 MMOL/L (ref 98–107)
CO2: 25 MMOL/L (ref 22–29)
CREAT SERPL-MCNC: 0.7 MG/DL (ref 0.5–1)
EKG ATRIAL RATE: 91 BPM
EKG P AXIS: 73 DEGREES
EKG P-R INTERVAL: 192 MS
EKG Q-T INTERVAL: 352 MS
EKG QRS DURATION: 74 MS
EKG QTC CALCULATION (BAZETT): 432 MS
EKG R AXIS: -18 DEGREES
EKG T AXIS: 41 DEGREES
EKG VENTRICULAR RATE: 91 BPM
EOSINOPHILS ABSOLUTE: 0.19 E9/L (ref 0.05–0.5)
EOSINOPHILS RELATIVE PERCENT: 2 % (ref 0–6)
GFR AFRICAN AMERICAN: >60
GFR NON-AFRICAN AMERICAN: >60 ML/MIN/1.73
GLUCOSE BLD-MCNC: 119 MG/DL (ref 74–99)
HCT VFR BLD CALC: 30.5 % (ref 34–48)
HEMOGLOBIN: 9.3 G/DL (ref 11.5–15.5)
IMMATURE GRANULOCYTES #: 0.05 E9/L
IMMATURE GRANULOCYTES %: 0.5 % (ref 0–5)
LYMPHOCYTES ABSOLUTE: 1.64 E9/L (ref 1.5–4)
LYMPHOCYTES RELATIVE PERCENT: 16.9 % (ref 20–42)
MCH RBC QN AUTO: 27.3 PG (ref 26–35)
MCHC RBC AUTO-ENTMCNC: 30.5 % (ref 32–34.5)
MCV RBC AUTO: 89.4 FL (ref 80–99.9)
MONOCYTES ABSOLUTE: 1.1 E9/L (ref 0.1–0.95)
MONOCYTES RELATIVE PERCENT: 11.3 % (ref 2–12)
NEUTROPHILS ABSOLUTE: 6.68 E9/L (ref 1.8–7.3)
NEUTROPHILS RELATIVE PERCENT: 68.9 % (ref 43–80)
PDW BLD-RTO: 16.9 FL (ref 11.5–15)
PLATELET # BLD: 199 E9/L (ref 130–450)
PMV BLD AUTO: 9.7 FL (ref 7–12)
POTASSIUM SERPL-SCNC: 4.4 MMOL/L (ref 3.5–5)
RBC # BLD: 3.41 E12/L (ref 3.5–5.5)
SARS-COV-2, NAAT: NOT DETECTED
SODIUM BLD-SCNC: 139 MMOL/L (ref 132–146)
TOTAL PROTEIN: 5.8 G/DL (ref 6.4–8.3)
WBC # BLD: 9.7 E9/L (ref 4.5–11.5)

## 2022-03-10 PROCEDURE — 2500000003 HC RX 250 WO HCPCS: Performed by: STUDENT IN AN ORGANIZED HEALTH CARE EDUCATION/TRAINING PROGRAM

## 2022-03-10 PROCEDURE — 97535 SELF CARE MNGMENT TRAINING: CPT

## 2022-03-10 PROCEDURE — 99231 SBSQ HOSP IP/OBS SF/LOW 25: CPT | Performed by: INTERNAL MEDICINE

## 2022-03-10 PROCEDURE — 97161 PT EVAL LOW COMPLEX 20 MIN: CPT

## 2022-03-10 PROCEDURE — 97530 THERAPEUTIC ACTIVITIES: CPT

## 2022-03-10 PROCEDURE — 85025 COMPLETE CBC W/AUTO DIFF WBC: CPT

## 2022-03-10 PROCEDURE — 2700000000 HC OXYGEN THERAPY PER DAY

## 2022-03-10 PROCEDURE — 6360000002 HC RX W HCPCS: Performed by: STUDENT IN AN ORGANIZED HEALTH CARE EDUCATION/TRAINING PROGRAM

## 2022-03-10 PROCEDURE — 97165 OT EVAL LOW COMPLEX 30 MIN: CPT

## 2022-03-10 PROCEDURE — 80053 COMPREHEN METABOLIC PANEL: CPT

## 2022-03-10 PROCEDURE — 1200000000 HC SEMI PRIVATE

## 2022-03-10 PROCEDURE — 87635 SARS-COV-2 COVID-19 AMP PRB: CPT

## 2022-03-10 PROCEDURE — 6370000000 HC RX 637 (ALT 250 FOR IP): Performed by: STUDENT IN AN ORGANIZED HEALTH CARE EDUCATION/TRAINING PROGRAM

## 2022-03-10 PROCEDURE — 36415 COLL VENOUS BLD VENIPUNCTURE: CPT

## 2022-03-10 PROCEDURE — 2580000003 HC RX 258: Performed by: STUDENT IN AN ORGANIZED HEALTH CARE EDUCATION/TRAINING PROGRAM

## 2022-03-10 PROCEDURE — 2580000003 HC RX 258: Performed by: NURSE PRACTITIONER

## 2022-03-10 RX ORDER — LEVOTHYROXINE SODIUM 0.07 MG/1
75 TABLET ORAL
Status: DISCONTINUED | OUTPATIENT
Start: 2022-03-11 | End: 2022-03-11 | Stop reason: HOSPADM

## 2022-03-10 RX ORDER — LEVOTHYROXINE SODIUM 0.05 MG/1
100 TABLET ORAL
Status: DISCONTINUED | OUTPATIENT
Start: 2022-03-12 | End: 2022-03-11 | Stop reason: HOSPADM

## 2022-03-10 RX ORDER — 0.9 % SODIUM CHLORIDE 0.9 %
250 INTRAVENOUS SOLUTION INTRAVENOUS ONCE
Status: COMPLETED | OUTPATIENT
Start: 2022-03-10 | End: 2022-03-10

## 2022-03-10 RX ADMIN — Medication 2000 MG: at 00:17

## 2022-03-10 RX ADMIN — Medication 2000 MG: at 09:38

## 2022-03-10 RX ADMIN — OXYCODONE HYDROCHLORIDE 5 MG: 5 TABLET ORAL at 21:54

## 2022-03-10 RX ADMIN — GABAPENTIN 300 MG: 300 CAPSULE ORAL at 20:13

## 2022-03-10 RX ADMIN — GABAPENTIN 300 MG: 300 CAPSULE ORAL at 13:54

## 2022-03-10 RX ADMIN — GABAPENTIN 300 MG: 300 CAPSULE ORAL at 09:38

## 2022-03-10 RX ADMIN — ENOXAPARIN SODIUM 40 MG: 100 INJECTION SUBCUTANEOUS at 09:38

## 2022-03-10 RX ADMIN — SODIUM CHLORIDE, PRESERVATIVE FREE 10 ML: 5 INJECTION INTRAVENOUS at 09:38

## 2022-03-10 RX ADMIN — PANTOPRAZOLE SODIUM 40 MG: 40 TABLET, DELAYED RELEASE ORAL at 04:28

## 2022-03-10 RX ADMIN — OXYCODONE HYDROCHLORIDE 5 MG: 5 TABLET ORAL at 04:26

## 2022-03-10 RX ADMIN — OXYCODONE HYDROCHLORIDE 5 MG: 5 TABLET ORAL at 17:37

## 2022-03-10 RX ADMIN — SODIUM CHLORIDE, PRESERVATIVE FREE 10 ML: 5 INJECTION INTRAVENOUS at 20:13

## 2022-03-10 RX ADMIN — SODIUM CHLORIDE 250 ML: 9 INJECTION, SOLUTION INTRAVENOUS at 13:55

## 2022-03-10 ASSESSMENT — PAIN SCALES - GENERAL
PAINLEVEL_OUTOF10: 6
PAINLEVEL_OUTOF10: 8
PAINLEVEL_OUTOF10: 6

## 2022-03-10 ASSESSMENT — PAIN DESCRIPTION - DESCRIPTORS
DESCRIPTORS: ACHING
DESCRIPTORS: ACHING;SORE;SHOOTING

## 2022-03-10 ASSESSMENT — PAIN DESCRIPTION - FREQUENCY
FREQUENCY: INTERMITTENT
FREQUENCY: CONTINUOUS

## 2022-03-10 ASSESSMENT — PAIN DESCRIPTION - PAIN TYPE
TYPE: SURGICAL PAIN
TYPE: SURGICAL PAIN

## 2022-03-10 ASSESSMENT — PAIN DESCRIPTION - ORIENTATION
ORIENTATION: RIGHT
ORIENTATION: RIGHT

## 2022-03-10 ASSESSMENT — PAIN SCALES - WONG BAKER: WONGBAKER_NUMERICALRESPONSE: 0

## 2022-03-10 ASSESSMENT — PAIN DESCRIPTION - ONSET
ONSET: ON-GOING
ONSET: ON-GOING

## 2022-03-10 ASSESSMENT — PAIN DESCRIPTION - LOCATION
LOCATION: HIP
LOCATION: HIP

## 2022-03-10 NOTE — PROGRESS NOTES
Physical Therapy  Physical Therapy Initial Evaluation    Name: Sukhdeep Alanis  : 1931  MRN: [id number]      Date of Service: 3/10/2022    Evaluating PT:  Hayley Martinez, PT [id number]      Room #:  7869/2067-V  Diagnosis:  Closed nondisplaced intertrochanteric fracture of right femur, initial encounter [facility]) [address]  Fall, initial encounter [W19. XXXA]  Intertrochanteric fracture of right hip, closed, initial encounter [facility]) [S72.141A]  Intertrochanteric fracture of right femur, closed, initial encounter ([facility] [address] [S72.141A]  PMHx/PSHx:     has a past medical history of Hyperlipidemia, Hypertension, Osteoporosis, Spinal stenosis, and Thyroid disease. has a past surgical history that includes hernia repair and Hip fracture surgery (Right, 3/9/2022). · Procedure/Surgery:  S/P R IT fx w/ [facility] 3/09/21    Precautions:  Falls,  WBAT (weight bearing as tolerated) RLE,   Equipment Needs: To be determined,    SUBJECTIVE:    Patient lives alone  in a two story home bedroom and bathroom 2nd floor full flight stairs with Rail  with 4 steps to enter with 1Rail  Bed is on 2 floor and bath is on 2 floor. Patient ambulated independently used cane for the community PTA. Equipment owned: [employer],      OBJECTIVE:   Initial Evaluation  Date: 3/10/22 Treatment Short Term/ Long Term   Goals   AM-PAC 6 Clicks 42/     Was pt agreeable to Eval/treatment? yes     Does pt have pain? Yes 0/10 at rest then 10/10 with activity. Bed Mobility  Rolling: Max    Supine to sit:   Max    Sit to supine: NT   Scooting: Max    Rolling: Min  Supine to sit: Min  Sit to supine: Min  Scooting: Min   Transfers Sit to stand: Mod x 2 to fww  Stand to sit: Mod x 2  Stand pivot: Mod x 2 with fww  Sit to stand: Min to fww  Stand to sit: Min   Stand pivot: Min with fww   Ambulation    side steps only to bedside chair.   feet with   25 feet with Min fww   Stair negotiation: ascended and descended  NT  TBD   ROM BUE:  wfl   BLE:  wfl Strength BUE: wfl   RLE:  Grossly 3-/5  LLE:  Grossly  4/5  4+/5   Balance Sitting EOB:  SBA  Dynamic Standing: Mod A x 2  Sitting EOB:  Ind  Dynamic Standing: Mod Ind     Patient is Alert & Oriented x person, place, time and situation and follows directions   Sensation:  Pt denies numbness and tingling to extremities  Edema:  Yes RLE    Vitals:  Seated   Blood Pressure at rest 155/69   Heart Rate at rest - bpm   SPO2 at rest 96% 5L     Therapeutic Exercises:  Functional activity as stated above. Patient education  Pt educated regarding role of PT evaluation, need for OOB activity and weight bearing precautions for RLE    Patient response to education:   Pt verbalized understanding Pt demonstrated skill Pt requires further education in this area   yes yes Reminders     ASSESSMENT:    Conditions Requiring Skilled Therapeutic Intervention:    [x]Decreased strength     [x]Decreased ROM  [x]Decreased functional mobility  [x]Decreased balance   [x]Decreased endurance   [x]Decreased posture  []Decreased sensation  []Decreased coordination   []Decreased vision  [x]Decreased safety awareness   []Increased pain       Comments:    RN cleared patient for participation in therapy session. Patient was seen this date for PT evaluation. . Patient was agreeable to intervention. Results of the functional assessment are noted above. Upon entering the room patient was found supine in bed. Family was present. Daughter stayed for entire session. Increased assist required to transition to EOB. Sat EOB x 10 minutes to increase dynamic sitting balance and activity tolerance. STS and transfer completed to bedside chair. Increased cues required. At end of session, patient in chairwith  call light and phone within reach,  all lines and tubes intact, nursing notified. This patient can benefit from the continuation of skilled PT possibly in a rehab setting to maximize functional level and return to PLOF.      Treatment:  Patient practiced and was instructed in the following treatment:    · Bed mobility training - pt given verbal and tactile cues to facilitate proper sequencing and safety during rolling and supine>sit as well as provided with physical assistance to complete task    · Assistive device training - pt educated on using [address] during gait, [address] approximation/negotiation, and hand placement during sit<>stand to [address]  · STS and transfer training - educated on hand/foot placement, safety, and sequencing during STS and pivot transfers using assistive device  · Gait training - verbal cues for [address] approximation/negotiation, upright posture, and safety during 90 and 180 degree turns during gait   · Education in [place] for RLE.  o Skilled repositioning in bed. Pt's/ family goals   1. Home when able. Prognosis is good  for reaching above PT goals. Patient and or family understand(s) diagnosis, prognosis, and plan of care.  yes,     PHYSICAL THERAPY PLAN OF CARE:    PT POC is established based on physician order and patient diagnosis     Referring provider/PT Order:  PT Eval and Treat   03/09/22 1200  PT eval and treat      Jeffry Lezama,        Diagnosis:  Closed nondisplaced intertrochanteric fracture of right femur, initial encounter ([facility] [address] Della Javed  Fall, initial encounter [W19. XXXA]  Intertrochanteric fracture of right hip, closed, initial encounter ([facility] [address] [S72.141A]  Intertrochanteric fracture of right femur, closed, initial encounter ([facility] [address] [S72.141A]  Specific instructions for next treatment:  Sit EOB, postural exercises, Transfer to bedside chair, Increase ambulation distance and BLE therapeutic exercise  Current Treatment Recommendations:     [x] Strengthening to improve independence with functional mobility   [x] ROM to improve independence with functional mobility   [x] Balance Training to improve static/dynamic balance and to reduce fall risk  [x] Endurance Training to improve activity tolerance during functional mobility [x] Transfer Training to improve safety and independence with all functional transfers   [x] Gait Training to improve gait mechanics, endurance and asses need for appropriate assistive device  [x] Stair Training in preparation for safe discharge home and/or into the community   [] Positioning to prevent skin breakdown and contractures  [x] Safety and Education Training   [] Patient/Caregiver Education   [] HEP  [] Other     PT long term treatment goals are located in above grid    Frequency of treatments: 2-5x/week x 1-2 weeks. Time in  1115  Time out  1155    Total Treatment Time  40 minutes     Evaluation Time includes thorough review of current medical information, gathering information on past medical history/social history and prior level of function, completion of standardized testing/informal observation of tasks, assessment of data and education on plan of care and goals.     CPT codes:  [x] Low Complexity PT evaluation 43318  [] Moderate Complexity PT evaluation 12098  [] High Complexity PT evaluation 94807  [] PT Re-evaluation 77817  [] Gait training 81857 - minutes  [] Manual therapy 78049 - minutes  [x] Therapeutic activities 75491 25 minutes  [] Therapeutic exercises 19914 - minutes  [] Neuromuscular reeducation 83885 - minutes     Debbie Rubin, [address] [facility]

## 2022-03-10 NOTE — PROGRESS NOTES
Subjective: The patient is awake and alert. No acute events overnight. Denies chest pain, angina, SOB   Currently requiring 4L NC    Objective:    BP (!) 127/58   Pulse 77   Temp 98.4 °F (36.9 °C) (Temporal)   Resp 18   Ht 5' 2\" (1.575 m)   Wt 126 lb 9 oz (57.4 kg)   SpO2 100%   BMI 23.15 kg/m²     In: 1860 [P.O.:360; I.V.:1500]  Out: 275   In: 1860   Out: 275 [Urine:150]    General appearance: NAD, conversant  HEENT: AT/NC, MMM  Neck: FROM, supple  Lungs: Diminished   CV: RRR, no MRGs  Vasc: Radial pulses 2+  Abdomen: Soft, non-tender; no masses or HSM  Extremities: No peripheral edema or digital cyanosis, R leg surgical dressing   Skin: no rash, lesions or ulcers  Psych: Alert and oriented to person, place and time  Neuro: Alert and interactive     Recent Labs     03/08/22  0535 03/09/22  1330 03/10/22  0830   WBC 7.7 8.9 9.7   HGB 10.3* 10.1* 9.3*   HCT 33.3* 33.4* 30.5*    175 199       Recent Labs     03/08/22  0639 03/09/22  1330 03/10/22  0830    138 139   K 4.0 4.4 4.4    107 106   CO2 28 24 25   BUN 19 19 15   CREATININE 0.9 0.6 0.7   CALCIUM 9.0 8.1* 8.3*       Assessment:    Principal Problem:    Closed nondisplaced intertrochanteric fracture of right femur (HCC)  Active Problems:    Gastroesophageal reflux disease without esophagitis    Primary hypertension    Hypothyroidism    Fall  Resolved Problems:    * No resolved hospital problems.  *      Plan:    71-year-old female with past medical history of hypertension and osteoporosis admitted to [place] [address] unit with   Intertrochanteric fracture of the right femur  -Monitor labs, CBC daily   -Pain control  -IV hydration can be discontinued  -Orthopedics following  -R ORIF 3/9   -Monitor labs-no evidence of significant postop anemia    Hypertension  -Continue home medications    Hypoxia  -Currently requiring 4L NC and prior to surgery-wean oxygen-currently saturating 100% on 4 L  -Will continue to monitor, patient did smoke for many years.  -Incentive spirometry education provided to patient and son at bedside. DVT Prophylaxis Lovenox   PT/OT  Discharge planning-family requesting additional night stay. We will plan discharge in a.m.       Meeta Ferreira MD  6:57 PM  3/10/2022

## 2022-03-10 NOTE — PROGRESS NOTES
PROGRESS NOTE     CARDIOLOGY    Chief complaint: Seen today for follow up, management & recommendations for preop cardiac risk stratification. She denies chest pain or shortness of breath today. She was being bathed. Wt Readings from Last 3 Encounters:   03/08/22 126 lb 9 oz (57.4 kg)   08/22/21 118 lb (53.5 kg)   03/04/21 120 lb (54.4 kg)     Temp Readings from Last 3 Encounters:   03/10/22 97.7 °F (36.5 °C) (Temporal)   08/22/21 97.3 °F (36.3 °C) (Oral)   03/04/21 97.8 °F (36.6 °C)     BP Readings from Last 3 Encounters:   03/10/22 (!) 108/56   03/09/22 (!) 182/87   08/22/21 (!) 147/63     Pulse Readings from Last 3 Encounters:   03/10/22 73   08/22/21 65   03/04/21 77         Intake/Output Summary (Last 24 hours) at 3/10/2022 1154  Last data filed at 3/9/2022 1200  Gross per 24 hour   Intake 120 ml   Output --   Net 120 ml       Recent Labs     03/08/22  0535 03/09/22  1330 03/10/22  0830   WBC 7.7 8.9 9.7   HGB 10.3* 10.1* 9.3*   HCT 33.3* 33.4* 30.5*   MCV 86.7 88.8 89.4    175 199     Recent Labs     03/08/22  0639 03/09/22  1330 03/10/22  0830    138 139   K 4.0 4.4 4.4    107 106   CO2 28 24 25   BUN 19 19 15   CREATININE 0.9 0.6 0.7     Recent Labs     03/07/22  1945   PROTIME 11.6   INR 1.0     No results for input(s): CKTOTAL, CKMB, CKMBINDEX, TROPONINI in the last 72 hours. No results for input(s): BNP in the last 72 hours. No results for input(s): CHOL, HDL, TRIG in the last 72 hours.     Invalid input(s): CHOLHDLR, LDLCALCU  Recent Labs     03/08/22  1108 03/08/22  1747   TROPHS 51* 67*         [START ON 3/11/2022] levothyroxine (SYNTHROID) tablet 75 mcg, Once per day on Mon Tue Wed Thu Fri  [START ON 3/12/2022] levothyroxine (SYNTHROID) tablet 100 mcg, Once per day on Sun Sat  ceFAZolin (ANCEF) 2000 mg in sterile water 20 mL IV syringe, See Admin Instructions  oxyCODONE (ROXICODONE) immediate release tablet 5 mg, Q4H PRN  senna (SENOKOT) tablet 8.6 mg, Daily PRN  ondansetron (ZOFRAN-ODT) disintegrating tablet 4 mg, Q8H PRN   Or  ondansetron (ZOFRAN) injection 4 mg, Q6H PRN  0.9 % sodium chloride infusion, Continuous  gabapentin (NEURONTIN) capsule 300 mg, TID  sodium chloride flush 0.9 % injection 5-40 mL, 2 times per day  sodium chloride flush 0.9 % injection 5-40 mL, PRN  0.9 % sodium chloride infusion, PRN  enoxaparin (LOVENOX) injection 40 mg, Daily  acetaminophen (TYLENOL) tablet 650 mg, Q6H PRN   Or  acetaminophen (TYLENOL) suppository 650 mg, Q6H PRN  bisacodyl (DULCOLAX) suppository 10 mg, Daily PRN  prochlorperazine (COMPAZINE) injection 5 mg, Q6H PRN  pantoprazole (PROTONIX) tablet 40 mg, QAM AC  valsartan (DIOVAN) tablet 160 mg, Daily  morphine (PF) injection 1 mg, Q6H PRN  morphine (PF) injection 2 mg, Q6H PRN  albuterol (PROVENTIL) nebulizer solution 2.5 mg, Q6H PRN        Review of systems:     Heart: as above   Lungs: as above   Eyes: denies changes in vision or discharge. Ears: denies changes in hearing or pain. Nose: denies epistaxis or masses   Throat: denies sore throat or trouble swallowing. Neuro: denies numbness, tingling, tremors. Skin: denies rashes or itching. : denies hematuria, dysuria   GI: denies vomiting, diarrhea   Psych: denies mood changed, anxiety, depression. Physical exam:    Constitutional: A&O x3, communicates well, no acute distress. Eyes: extraocular muscles intact, PERRL. Normal lids & conjunctiva. No icterus. ENT: clear, no bleeding. No external masses. Lips normal formation. Neck: supple, full ROM, no JVD, no bruits, no lymphadenopathy. No masses. trachea midline. Heart: regular rate & rhythm, normal S1 & S2, no abnormal murmurs. No heave. Lungs: CTA. No accessory muscles. Abd: soft, non-tender. Normal bowel sounds. Neuro: Full ROM X 4, EOMI, no tremors. EXT: No bilateral lower extremity edema  Skin: warm, dry, intact. Good turgor.   Psych: A&O x 3, normal behavior, not anxious. Assessment/Recommendations  1. Borderline troponin. No cardiac symptoms. 2. Fall with intertrochanteric fracture. Now status post ORIF. No perioperative cardiac events. 3. Hypertension, well controlled. 4. GERD. 5. Hypothyroidism. 6. No active cardiac issues. Cardiology will sign off. Note: This report was completed using computerized voice recognition software. Every effort has been made to ensure accuracy, however; and invert and computerized transcription errors may be present.

## 2022-03-10 NOTE — PROGRESS NOTES
Department of Orthopedic Surgery  Resident Progress Note    Patient seen and examined. Pain controlled. No new complaints. Denies chest pain, shortness of breath, dizziness/lightheadedness. Pain controlled. Has not been out of bed yet. -BM, - flatulence. States she has some numbness on the left dorsum of the foot, minimal but new per her since surgery. Has concerns about a short leg, states prior to surgery she was using lifts in her right shoe.  No other complaints    VITALS:  BP (!) 108/56   Pulse 73   Temp 97.7 °F (36.5 °C) (Temporal)   Resp 16   Ht 5' 2\" (1.575 m)   Wt 126 lb 9 oz (57.4 kg)   SpO2 94%   BMI 23.15 kg/m²     General: alert and oriented    MUSCULOSKELETAL:   right lower extremity:  · Dressing C/D/I  · Compartments soft and compressible  · +PF/DF/EHL  · +2/4 DP & PT pulses, Brisk Cap refill, Toes warm and perfused  · Distal sensation grossly intact to Peroneals, Sural, Saphenous, and tibial nrs    Left lower extremity   · Skin is intact circumferentially  · Negative TTP   · Compartments soft and compressible  · +5/5 GS/TA/EHL  · +2/4 DP & PT pulses, Cap refill <3 sec, Toes warm and perfused  · Distal sensation grossly intact to Peroneals, Tibial, Sural, Saphenous nrs with mild subjective decrease to dorsum of foot but sensation still intact      CBC:   Lab Results   Component Value Date    WBC 8.9 03/09/2022    HGB 10.1 03/09/2022    HCT 33.4 03/09/2022     03/09/2022     PT/INR:    Lab Results   Component Value Date    PROTIME 11.6 03/07/2022    INR 1.0 03/07/2022       ASSESSMENT  · S/P R IT fx w/ [facility] 3/09/21    PLAN    RLE WBAT  24hr ABX coverage   IV hydration   Monitor numbness to right dorsum, no pain on examination   DVT prophylaxis- ok for lovenox inpatient, transition to ASA 325BIDmg after D/C, early mobilization   PT/OT  Pain control- IV & PO  Monitor H&H: awaiting am labs, post op 10.1/33.4, vitals stable, afebrile  Dispo: PT/OT/SW recs, planning  D/w attending

## 2022-03-10 NOTE — PROGRESS NOTES
[address]      Date:3/10/2022                                                Patient Name: Gilles Ramirez  MRN: [id number]  : 1931  Room: [address] [place] [address]     Evaluating OT:Guillermina Denis OTR/L   License #  [id number]       Referring Provider:Jordan Valles DO     Specific Provider Orders/Date: OT evaluation & treatment        Diagnosis:  Right intertrochanteric femur fracture    Pertinent Medical History:  has a past medical history of Hyperlipidemia, Hypertension, Osteoporosis, Spinal stenosis, and Thyroid disease. Surgery: 3-9-22: RIGHT INTERTROCHANTERIC FEMUR FRACTURE  OPEN REDUCTION INTERNAL FIXATION WITH [facility]    Past Surgical History:  has a past surgical history that includes hernia repair and Hip fracture surgery (Right, 3/9/2022). Precautions:  Fall Risk, WBAT R LE, O2 at 3L via NC      Assessment of current deficits   [x] Functional mobility            [x]ADLs           [x] Strength                  [x]Cognition    [x] Functional transfers          [x] IADLs         [x] Safety Awareness   [x]Endurance    [x] Fine Coordination                         [x] Balance      [] Vision/perception   [x]Sensation      []Gross Motor Coordination             [] ROM           [] Delirium                   [] Motor Control      OT PLAN OF CARE   OT POC based on physician orders, patient diagnosis and results of clinical assessment     Frequency/Duration: 2-4 days/wk for 2 weeks PRN   Specific OT Treatment Interventions to include:    Instruction/training on adapted ADL techniques and AE recommendations to increase functional independence within precautions  Training on energy conservation strategies, correct breathing pattern and techniques to improve independence/tolerance for self-care routine  Functional transfer/mobility training/DME recommendations for increased independence, safety, and fall prevention  Patient/Family education to increase follow through with safety techniques and functional independence  Recommendation of environmental modifications for increased safety with functional transfers/mobility and ADLs  Cognitive retraining/development of therapeutic activities to improve problem solving, judgement, memory, and attention for increased safety/participation in ADL/IADL tasks  Therapeutic exercise to improve motor endurance, ROM, and functional strength for ADLs/functional transfers  Therapeutic activities to facilitate/challenge dynamic balance, stand tolerance for increased safety and independence with ADLs  Therapeutic activities to facilitate gross/fine motor skills for increased independence with ADLs  Positioning to improve skin integrity, interaction with environment and functional independence     Recommended Adaptive Equipment: TBD      Home Living: Pt lives alone in a 2 story with 4 steps to enter with 1 HR. B&B on 2nd level. Bathroom setup: tub/shower, higher commode   Equipment owned: shower chair, rollator, spc     Prior Level of Function: Ind. with ADLs , Ind. with IADLs; ambulated spc prn in home, rollator in community  Driving: active  Occupation: secretarial work     Pain Level: no pain at rest; pt. States \"10/10\" with light ax.  RN notified  Cognition: A&O: 4/4; Follows 2 step directions              Memory:  good              Sequencing:  fair              Problem solving:  fair              Judgement/safety:  fair                Functional Assessment:  AM-PAC Daily Activity Raw Score: 14/24    Initial Eval Status  Date: 3-10-22 Treatment Status  Date: STGs = LTGs  Time frame: 10-14 days   Feeding Ind. Mod I/ Ind   Grooming SBA seated   Modified [place]    UB Dressing Min A to jarrell sweater seated   EOB   Modified [place]    LB Dressing Dep   Sup/Modified [place]    Bathing Max A with sim.  task   Sup/Modified [place] Toileting NT   Sup/Modified [place]    Bed Mobility  Supine to sit: Max A  Sit to supine: NT   Supine to sit: Modified [place]   Sit to supine: Modified [place]    Functional Transfers Mod A x2 with sit <> stand, SPT using ww   Supervision    Functional Mobility Mod A x2 with few steps towards chair with ww, assist to advance R LE & manage ww. Supervision    Balance Sitting:     Static:  SBA    Dynamic:Min A  Standing: Mod A       Activity Tolerance Fair- lt. Ax.   Fair+/Good with mod ax. Visual/  Perceptual Glasses: yes          Vitals /69 seated  spO2 on 3 L   via NC: 98%  HR 58-72 bpm   WFL      Hand Dominance R    AROM (PROM) Strength Additional Info:    RUE  WFL 4/5 good  and wfl FMC/dexterity noted during ADL tasks      LUE WFL 4/5 good  and wfl FMC/dexterity noted during ADL tasks         Hearing: WFL   Sensation:  No c/o numbness or tingling B UE  Tone: WFL   Edema: none noted B  UE     Comments: Upon arrival patient supine in bed, son & dtr. Present & educated throughout session, agreeable to OT, cleared by Nursing. Therapist facilitated bed mobility/Seated ADLs/ functional transfer training & monitoring of vitals with focus on safety, technique & precautions. Pt. Instructed RE: safe transfers/mobility, ADLs, role of OT, treatment plan, recs. , prec. At end of session, patient seated in bedside chair, family present, all needs met, RN notified, with call light and phone within reach, all lines and tubes intact. Overall patient demonstrated decreased strength, balance, independence & safety during completion of ADL/functional transfer/mobility tasks. Pt would benefit from continued skilled OT to increase safety and independence with completion of ADL/IADL tasks for functional independence and quality of life.      Treatment: OT treatment provided this date includes:  ·  Instruction/training on safety and adapted techniques for completion of ADLs: to increase [place] in self care  ·  Instruction/training on safe functional mobility/transfer techniques: with focus on safety, technique & precautions  ·  Instruction/training on energy conservation/work simplification for completion of ADLs: techniques to increase [place] with self care ADLs & iADLs, work simplification to improve endurance  ·  Proper Positioning/Alignment: for optimal healing, skin integrity to prevent breakdown, decrease edema  · Skilled monitoring of vitals: to include BP, spO2 & HR during session  · Sitting/standing Balance/Tolerance- to increased balance & activity tolerance during ADLs as well as facilitate proper posture and/or positioning. Rehab Potential: Good for established goals     Patient / Family Goal: to return home soon       Patient and/or family were instructed on functional diagnosis, prognosis/goals and OT plan of care. Demonstrated fair+ understanding. Eval Complexity: Low     Time In: 11:15  Time Out: 11:50  Total Treatment Time: 25    Min Units   OT Eval Low 70925  x     OT Eval Medium 36285       OT Eval High 78593       OT Re-Eval [id number]       Therapeutic Ex 36728       Therapeutic Activities 65314  10  1   ADL/Self Care 65100  15  1   Orthotic Management 71872       Manual [id number]       Neuro Re-Ed 60785       Non-Billable Time          Evaluation Time additionally includes thorough review of current medical information, gathering information on past medical history/social history and prior level of function, interpretation of standardized testing/informal observation of tasks, assessment of data and development of plan of care and goals. Guillermina Denis, OTR/L   License #  [id number]

## 2022-03-10 NOTE — DISCHARGE SUMMARY
[facility]   Discharge summary   Patient ID:  Ida Rivera  [id number]  56 y.o.  12/19/1931    Admit date: 3/7/2022    Discharge date and time: 3/11/2022    Admission Diagnoses:   Patient Active Problem List   Diagnosis    Near syncope    Other chronic pain    Chest pain    Hypertension    Hyperlipidemia    Nausea    Closed nondisplaced intertrochanteric fracture of right femur (HCC)    Gastroesophageal reflux disease without esophagitis    Primary hypertension    Hypothyroidism    Fall       Discharge Diagnoses: closed nondisplaced intertrochanteric fracture right femur    Consults: cardiology and orthopedic surgery    Procedures: R ORIF femur 3/9    Hospital Course:     43-year-old female with past medical history of hypertension and osteoporosis admitted to [place] [address] unit with   Intertrochanteric fracture of the right femur  -Monitor labs, CBC daily   -Pain control  -IV hydration > discontinued   -R ORIF 3/9   -Monitor labs     Hypertension  -Continue home medications  Adequate      Hypoxia  -currently on 4L NC sating 100% - dc on home 02   -Will continue to monitor, patient did smoke for many years. - no acute needs   -Incentive spirometry education provided to patient and son at bedside. Recent Labs     03/08/22  0535 03/09/22  1330 03/10/22  0830   WBC 7.7 8.9 9.7   HGB 10.3* 10.1* 9.3*   HCT 33.3* 33.4* 30.5*    175 199       Recent Labs     03/08/22  0639 03/09/22  1330 03/10/22  0830    138 139   K 4.0 4.4 4.4    107 106   CO2 28 24 25   BUN 19 19 15   CREATININE 0.9 0.6 0.7   CALCIUM 9.0 8.1* 8.3*       XR FEMUR RIGHT (MIN 2 VIEWS)    Result Date: 3/7/2022  EXAMINATION: 2 XRAY VIEWS OF THE RIGHT FEMUR; ONE XRAY VIEW OF THE PELVIS AND TWO XRAY VIEWS RIGHT HIP 3/7/2022 5:54 pm COMPARISON: None.  HISTORY: ORDERING SYSTEM PROVIDED HISTORY: r/o fx TECHNOLOGIST PROVIDED HISTORY: Reason for exam:->r/o fx What reading provider will be dictating this exam?->CRC PROVIDED HISTORY: r/o fx TECHNOLOGIST PROVIDED HISTORY: Reason for exam:->r/o fx What reading provider will be dictating this exam?->CRC FINDINGS: There is a nondisplaced intertrochanteric fracture at the right femur. Moderate osteoarthritis at the right hip joint is also present. Moderate tricompartmental osteoarthritis at the right knee. Normal soft tissues. Nondisplaced intertrochanteric fracture at the right femur. Osteoarthritis at the right hip and right knee. Discharge Exam:    HEENT: NCAT,  PERRLA, No JVD  Heart:  RRR, no murmurs, gallops, or rubs. Lungs:  CTA bilaterally, no wheeze, rales or rhonchi  Abd: bowel sounds present, nontender, nondistended, no masses  Extrem:  No clubbing, cyanosis, or edema    Disposition: [facility]     Patient Condition at Discharge: Stable    Patient Instructions:      Medication List      START taking these medications    aspirin 325 MG EC tablet  Take 1 tablet by mouth 2 times daily for 28 days     oxyCODONE-acetaminophen 5-325 MG per tablet  Commonly known as: Percocet  Take 1 tablet by mouth every 6 hours as needed for Pain for up to 7 days. Intended supply: 7 days.  Take lowest dose possible to manage pain        CONTINUE taking these medications    albuterol sulfate  (90 Base) MCG/ACT inhaler  Commonly known as: Ventolin HFA  Inhale 2 puffs into the lungs 4 times daily as needed for Wheezing     amphetamine-dextroamphetamine 10 MG extended release capsule  Commonly known as: ADDERALL XR     COQ10 PO     * Synthroid 100 MCG tablet  Generic drug: levothyroxine     * levothyroxine 75 MCG tablet  Commonly known as: SYNTHROID     ondansetron 4 MG tablet  Commonly known as: ZOFRAN  Take 1 tablet by mouth 3 times daily as needed for Nausea or Vomiting     pantoprazole 40 MG tablet  Commonly known as: PROTONIX  Take 1 tablet by mouth every morning (before breakfast)     tiZANidine 2 MG tablet  Commonly known as: ZANAFLEX  Take 1 tablet by mouth nightly as needed (left leg pain)     valsartan 160 MG tablet  Commonly known as: DIOVAN     VITAMIN B COMPLEX PO     Vitamin D3 250 MCG (10452 UT) Caps         * This list has 2 medication(s) that are the same as other medications prescribed for you. Read the directions carefully, and ask your doctor or other care provider to review them with you. STOP taking these medications    HYDROcodone-acetaminophen 5-325 MG per tablet  Commonly known as: Gilmer Ervin           Where to Get Your Medications      You can get these medications from any pharmacy    Bring a paper prescription for each of these medications  · aspirin 325 MG EC tablet  · oxyCODONE-acetaminophen 5-325 MG per tablet       Activity: activity as tolerated  Diet: regular diet    Pt has been advised to: Follow-up with Marko Nieves DO in 1 week. Follow-up with consultants as recommended by them    Note that over 30 minutes was spent in preparing discharge papers, discussing discharge with patient, medication review, etc.    Signed:  JAXSON Johnson CNP  3/10/2022  2:58 PM     Above note edited to reflect my thoughts     I personally saw, examined and provided care for the patient. Radiographs, labs and medication list were reviewed by me independently. The case was discussed in detail and plans for care were established. Review of CHA Johnson   , documentation was conducted and revisions were made as appropriate directly by me. I agree with the above documented exam, problem list, and plan of care.      Carley Parish MD  11:57 AM  3/11/2022

## 2022-03-11 VITALS
HEART RATE: 97 BPM | OXYGEN SATURATION: 93 % | DIASTOLIC BLOOD PRESSURE: 53 MMHG | TEMPERATURE: 98.8 F | BODY MASS INDEX: 23.29 KG/M2 | RESPIRATION RATE: 18 BRPM | WEIGHT: 126.56 LBS | HEIGHT: 62 IN | SYSTOLIC BLOOD PRESSURE: 117 MMHG

## 2022-03-11 LAB
ALBUMIN SERPL-MCNC: 2.8 G/DL (ref 3.5–5.2)
ALP BLD-CCNC: 43 U/L (ref 35–104)
ALT SERPL-CCNC: 23 U/L (ref 0–32)
ANION GAP SERPL CALCULATED.3IONS-SCNC: 8 MMOL/L (ref 7–16)
AST SERPL-CCNC: 23 U/L (ref 0–31)
BILIRUB SERPL-MCNC: 0.3 MG/DL (ref 0–1.2)
BUN BLDV-MCNC: 12 MG/DL (ref 6–23)
CALCIUM SERPL-MCNC: 8.3 MG/DL (ref 8.6–10.2)
CHLORIDE BLD-SCNC: 105 MMOL/L (ref 98–107)
CO2: 24 MMOL/L (ref 22–29)
CREAT SERPL-MCNC: 0.6 MG/DL (ref 0.5–1)
GFR AFRICAN AMERICAN: >60
GFR NON-AFRICAN AMERICAN: >60 ML/MIN/1.73
GLUCOSE BLD-MCNC: 108 MG/DL (ref 74–99)
HCT VFR BLD CALC: 28.8 % (ref 34–48)
HEMOGLOBIN: 8.8 G/DL (ref 11.5–15.5)
MCH RBC QN AUTO: 27 PG (ref 26–35)
MCHC RBC AUTO-ENTMCNC: 30.6 % (ref 32–34.5)
MCV RBC AUTO: 88.3 FL (ref 80–99.9)
PDW BLD-RTO: 16.7 FL (ref 11.5–15)
PLATELET # BLD: 212 E9/L (ref 130–450)
PMV BLD AUTO: 9.3 FL (ref 7–12)
POTASSIUM SERPL-SCNC: 4.4 MMOL/L (ref 3.5–5)
RBC # BLD: 3.26 E12/L (ref 3.5–5.5)
SARS-COV-2, NAAT: NOT DETECTED
SODIUM BLD-SCNC: 137 MMOL/L (ref 132–146)
TOTAL PROTEIN: 5.6 G/DL (ref 6.4–8.3)
WBC # BLD: 8.2 E9/L (ref 4.5–11.5)

## 2022-03-11 PROCEDURE — 80053 COMPREHEN METABOLIC PANEL: CPT

## 2022-03-11 PROCEDURE — 97530 THERAPEUTIC ACTIVITIES: CPT

## 2022-03-11 PROCEDURE — 6370000000 HC RX 637 (ALT 250 FOR IP): Performed by: STUDENT IN AN ORGANIZED HEALTH CARE EDUCATION/TRAINING PROGRAM

## 2022-03-11 PROCEDURE — 87635 SARS-COV-2 COVID-19 AMP PRB: CPT

## 2022-03-11 PROCEDURE — 2580000003 HC RX 258: Performed by: STUDENT IN AN ORGANIZED HEALTH CARE EDUCATION/TRAINING PROGRAM

## 2022-03-11 PROCEDURE — 6360000002 HC RX W HCPCS: Performed by: STUDENT IN AN ORGANIZED HEALTH CARE EDUCATION/TRAINING PROGRAM

## 2022-03-11 PROCEDURE — 97535 SELF CARE MNGMENT TRAINING: CPT

## 2022-03-11 PROCEDURE — 85027 COMPLETE CBC AUTOMATED: CPT

## 2022-03-11 PROCEDURE — 2700000000 HC OXYGEN THERAPY PER DAY

## 2022-03-11 PROCEDURE — 36415 COLL VENOUS BLD VENIPUNCTURE: CPT

## 2022-03-11 PROCEDURE — 6370000000 HC RX 637 (ALT 250 FOR IP): Performed by: NURSE PRACTITIONER

## 2022-03-11 RX ADMIN — PANTOPRAZOLE SODIUM 40 MG: 40 TABLET, DELAYED RELEASE ORAL at 06:36

## 2022-03-11 RX ADMIN — GABAPENTIN 300 MG: 300 CAPSULE ORAL at 09:41

## 2022-03-11 RX ADMIN — LEVOTHYROXINE SODIUM 75 MCG: 0.07 TABLET ORAL at 06:36

## 2022-03-11 RX ADMIN — ENOXAPARIN SODIUM 40 MG: 100 INJECTION SUBCUTANEOUS at 09:13

## 2022-03-11 RX ADMIN — SODIUM CHLORIDE, PRESERVATIVE FREE 10 ML: 5 INJECTION INTRAVENOUS at 09:13

## 2022-03-11 RX ADMIN — VALSARTAN 160 MG: 160 TABLET, FILM COATED ORAL at 09:13

## 2022-03-11 ASSESSMENT — PAIN SCALES - GENERAL: PAINLEVEL_OUTOF10: 0

## 2022-03-11 NOTE — PROGRESS NOTES
Department of Orthopedic Surgery  Resident Progress Note    Patient seen and examined. Pain controlled. No new complaints. Denies chest pain, shortness of breath, dizziness/lightheadedness. Pain controlled. Worked with PT yesterday 11/24. -BM, - flatulence. States she has some numbness on the left dorsum of the foot, minimal but new per her since surgery. Yesterday had concerns about a short leg, states prior to surgery she was using lifts in her right shoe.  No other complaints    VITALS:  BP (!) 124/59   Pulse 84   Temp 98.8 °F (37.1 °C) (Temporal)   Resp 18   Ht 5' 2\" (1.575 m)   Wt 126 lb 9 oz (57.4 kg)   SpO2 100%   BMI 23.15 kg/m²     General: alert and oriented    MUSCULOSKELETAL:   right lower extremity:  · Dressing C/D/I  · Compartments soft and compressible  · +PF/DF/EHL  · +2/4 DP & PT pulses, Brisk Cap refill, Toes warm and perfused  · Distal sensation grossly intact to Peroneals, Sural, Saphenous, and tibial nrs    Left lower extremity   · Skin is intact circumferentially  · Negative TTP   · Compartments soft and compressible  · +5/5 GS/TA/EHL  · +2/4 DP & PT pulses, Cap refill <3 sec, Toes warm and perfused  · Distal sensation grossly intact to Peroneals, Tibial, Sural, Saphenous nrs with mild subjective decrease to dorsum of foot but sensation still intact, similar to yesterday      CBC:   Lab Results   Component Value Date    WBC 8.2 03/11/2022    HGB 8.8 03/11/2022    HCT 28.8 03/11/2022     03/11/2022     PT/INR:    Lab Results   Component Value Date    PROTIME 11.6 03/07/2022    INR 1.0 03/07/2022       ASSESSMENT  · S/P R IT fx w/ [facility] 3/09/21    PLAN    RLE WBAT  24hr ABX coverage- completed  IV hydration   Monitor numbness to right dorsum, no pain on examination   DVT prophylaxis- ok for lovenox inpatient, transition to ASA 325BIDmg after D/C, early mobilization   PT/OT  Pain control- IV & PO, wean to orals as able  Monitor H&H:8.8 this am, vss  Dispo: PT/OT/SW recs, planning  D/w attending

## 2022-03-11 NOTE — PROGRESS NOTES
Occupational Therapy  OT BEDSIDE TREATMENT NOTE   [facility] [address] [facility]  [address] [place] [address]        Date:3/11/2022  Patient Name: Zoë Aceves  MRN: [id number]  : 1931  Room:      Per OT Eval:    Evaluating OT:Guillermina Denis, OTR/L   License #  [id number]        Referring Provider:Jordan Capone DO     Specific Provider Orders/Date: OT evaluation & treatment        Diagnosis:  Right intertrochanteric femur fracture    Pertinent Medical History:  has a past medical history of Hyperlipidemia, Hypertension, Osteoporosis, Spinal stenosis, and Thyroid disease. Surgery: 3-9-22: RIGHT INTERTROCHANTERIC FEMUR FRACTURE  OPEN REDUCTION INTERNAL FIXATION WITH PHS    Past Surgical History:  has a past surgical history that includes hernia repair and Hip fracture surgery (Right, 3/9/2022). Precautions:  Fall Risk, WBAT R LE, O2 at 3L via NC      Assessment of current deficits   [x] Functional mobility            [x]ADLs           [x] Strength                  [x]Cognition    [x] Functional transfers          [x] IADLs         [x] Safety Awareness   [x]Endurance    [x] Fine Coordination                         [x] Balance      [] Vision/perception   [x]Sensation      []Gross Motor Coordination             [] ROM           [] Delirium                   [] Motor Control      OT PLAN OF CARE   OT POC based on physician orders, patient diagnosis and results of clinical assessment     Frequency/Duration: 2-4 days/wk for 2 weeks PRN   Specific OT Treatment Interventions to include:    Instruction/training on adapted ADL techniques and AE recommendations to increase functional independence within precautions  Training on energy conservation strategies, correct breathing pattern and techniques to improve independence/tolerance for self-care routine  Functional transfer/mobility training/DME recommendations for increased independence, safety, and fall prevention  Patient/Family education to increase follow through with safety techniques and functional independence  Recommendation of environmental modifications for increased safety with functional transfers/mobility and ADLs  Cognitive retraining/development of therapeutic activities to improve problem solving, judgement, memory, and attention for increased safety/participation in ADL/IADL tasks  Therapeutic exercise to improve motor endurance, ROM, and functional strength for ADLs/functional transfers  Therapeutic activities to facilitate/challenge dynamic balance, stand tolerance for increased safety and independence with ADLs  Therapeutic activities to facilitate gross/fine motor skills for increased independence with ADLs  Positioning to improve skin integrity, interaction with environment and functional independence     Recommended Adaptive Equipment: TBD      Home Living: Pt lives alone in a 2 story with 4 steps to enter with 1 HR. B&B on 2nd level.   Bathroom setup: tub/shower, higher commode   Equipment owned: shower chair, rollator, spc     Prior Level of Function: Ind. with ADLs , Ind. with IADLs; ambulated spc prn in home, rollator in community  Driving: active  Occupation: secretarial work     Pain Level: Pt complained of  RLE pain this sessoin  Cognition: A&O: 4/4; Follows 2 step directions              Memory:  good              Sequencing:  fair              Problem solving:  fair              Judgement/safety:  fair                Functional Assessment:  AM-PAC Daily Activity Raw Score: 12/24    Initial Eval Status  Date: 3-10-22 Treatment Status  Date: 3/11/22 STGs = LTGs  Time frame: 10-14 days   Feeding Ind. Kate  Pt requiring assistance to open packages, retrieve cup, pt then able to grasp and bring to mouth, drinking from straw setup   Grooming SBA seated Kate  Pt washed face, assistance to comb hair setup   UB Dressing Min A to jarrell sweater seated   EOB  maxA  Jarrell jacket seated EOB SBA    LB Dressing Dep  Dependent  [facility] socks Min A with AE prn   Bathing Max A with sim. task  maxA  Simulated Task SBA seated with LHS   Toileting NT  DNT Mod A BSC   Bed Mobility  Supine to sit: Max A  Sit to supine: NT  maxA  Supine<>EOB Supine to sit: min A   Sit to supine: min A    Functional Transfers Mod A x2 with sit <> stand, SPT using ww  maxAx2  Sit to Stand  Stand to Sit    Stand Pivot Transfer EOB<>Chair with w.w. Cueing for hand placement Min A    Functional Mobility Mod A x2 with few steps towards chair with ww, assist to advance R LE & manage ww.  maxAx2  Pt took of short step during SPT, assist to move of RLE, assistance to manage of walker, max cueing for safety Mod A with ww   Balance Sitting:     Static:  SBA    Dynamic:Min A  Standing: Mod A  Sitting EOB:  Kate   ~20mins    Standing:  maxAx2     Activity Tolerance Fair- lt. Ax.  Fair- Fair+/Good with mod ax. Visual/  Perceptual Glasses: yes          Vitals /69 seated  spO2 on 3 L   via NC: 98%  HR 58-72 bpm   WFL      Hand Dominance R    AROM (PROM) Strength Additional Info:    RUE  WFL 4/5 good  and wfl FMC/dexterity noted during ADL tasks      LUE WFL 4/5 good  and wfl FMC/dexterity noted during ADL tasks         Hearing: WFL   Sensation:  No c/o numbness or tingling B UE  Tone: WFL   Edema: none noted B  UE          Comments/Treatment/Education: Upon arrival pt supine in bed, being lethargic and confused, but answering of questions when asked, son present, speaking with nursing okaying pt to be seen this session. Pt completed of bed mobility, functional mobility of short steps, transfers and light ADL tasks this session. Pt and son were educated on role of OT, goals to be reached, importance of OOB activity, precautions to follow, and sitting balance/posture while unsupported EOB. Pt being very confused and fatigued during session, becoming more alert towards end of session, with nursing present and aware.  At end of session, pt seated upright in chair, all lines and tubes intact, call light within reach, son present, nursing aware. Pt has made limited progress towards set goals.    Continue with current plan of care focusing on increasing of independency with transfers and ADL tasks      Treatment Time In: 9:20am           Treatment Time Out: 10:00am               Treatment Charges: Mins Units   Ther Ex  10639     Manual Therapy 29035     Thera Activities 84884 30 2   ADL/Home Mgt 43771 10 1   Neuro Re-ed 72145     Group Therapy      Orthotic manage/training  41390     Non-Billable Time     Total Timed Treatment 40 3        Yuli Cartwright COLON/L 10007

## 2022-03-11 NOTE — PROGRESS NOTES
Nurse to nurse called to [facility] to schuyler and papers faxed to [phone].

## 2022-03-11 NOTE — CARE COORDINATION
3/11/2022 social work transition of care planning  Pt plan is to [facility] today at noon. Will need negative covid test today.   Electronically signed by JOSE GUADALUPE Ba on 3/11/2022 at 7:44 AM

## 2022-03-24 ENCOUNTER — OFFICE VISIT (OUTPATIENT)
Dept: ORTHOPEDIC SURGERY | Age: 87
End: 2022-03-24

## 2022-03-24 VITALS — WEIGHT: 122 LBS | BODY MASS INDEX: 21.62 KG/M2 | HEIGHT: 63 IN

## 2022-03-24 DIAGNOSIS — S72.144D CLOSED NONDISPLACED INTERTROCHANTERIC FRACTURE OF RIGHT FEMUR WITH ROUTINE HEALING, SUBSEQUENT ENCOUNTER: Primary | ICD-10-CM

## 2022-03-24 PROCEDURE — 99024 POSTOP FOLLOW-UP VISIT: CPT | Performed by: ORTHOPAEDIC SURGERY

## 2022-03-24 RX ORDER — OXYCODONE HYDROCHLORIDE 5 MG/1
5 CAPSULE ORAL EVERY 6 HOURS PRN
COMMUNITY

## 2022-03-24 RX ORDER — LOSARTAN POTASSIUM 100 MG/1
100 TABLET ORAL DAILY
COMMUNITY

## 2022-03-24 RX ORDER — GABAPENTIN 100 MG/1
CAPSULE ORAL
COMMUNITY
Start: 2022-03-03

## 2022-03-24 RX ORDER — MULTIVIT-MIN/IRON/FOLIC ACID/K 18-600-40
1 CAPSULE ORAL DAILY
COMMUNITY

## 2022-03-24 NOTE — PROGRESS NOTES
Chief Complaint:   Chief Complaint   Patient presents with    Post-Op Check     Two weeks out ORIF right intertrochanteric femur fx. Patient states she is doing well. HPI 5year-old woman underwent ORIF right hip intertrochanteric fracture 392. Fixation with sliding hip screw. She is here today from [facility] subacute rehab accompanied by her son. Patient lives independently had been in [place] with her daughter until the day before she returned home and fell and injured her hip. Patient dates she is doing well she has pain but it is improving and she is working on her mobility. Patient Active Problem List   Diagnosis    Near syncope    Other chronic pain    Chest pain    Hypertension    Hyperlipidemia    Nausea    Closed nondisplaced intertrochanteric fracture of right femur (HCC)    Gastroesophageal reflux disease without esophagitis    Primary hypertension    Hypothyroidism    Fall       Past Medical History:   Diagnosis Date    Hyperlipidemia     Hypertension     Osteoporosis     Spinal stenosis     Thyroid disease        Past Surgical History:   Procedure Laterality Date    HERNIA REPAIR      HIP FRACTURE SURGERY Right 3/9/2022    RIGHT INTERTROCHANTERIC FEMUR FRACTURE  OPEN REDUCTION INTERNAL FIXATION WITH PHS---SYNTHES performed by Kari Ayers MD at [facility]       Current Outpatient Medications   Medication Sig Dispense Refill    gabapentin (NEURONTIN) 100 MG capsule take 3 capsules by mouth three times a day      Ascorbic Acid (VITAMIN C) 500 MG CAPS Take 1 capsule by mouth daily      oxyCODONE 5 MG capsule Take 5 mg by mouth every 6 hours as needed for Pain.       losartan (COZAAR) 100 MG tablet Take 100 mg by mouth daily      aspirin 325 MG EC tablet Take 1 tablet by mouth 2 times daily for 28 days 56 tablet 0    tiZANidine (ZANAFLEX) 2 MG tablet Take 1 tablet by mouth nightly as needed (left leg pain) 15 tablet 0    ondansetron (ZOFRAN) 4 MG tablet Take 1 tablet by mouth 3 times daily as needed for Nausea or Vomiting 15 tablet 0    albuterol sulfate HFA (VENTOLIN HFA) 108 (90 Base) MCG/ACT inhaler Inhale 2 puffs into the lungs 4 times daily as needed for Wheezing 1 Inhaler 0    pantoprazole (PROTONIX) 40 MG tablet Take 1 tablet by mouth every morning (before breakfast) 30 tablet 0    amphetamine-dextroamphetamine (ADDERALL XR) 10 MG extended release capsule take 1 capsule by mouth once daily  0    SYNTHROID 100 MCG tablet Take 100 mcg by mouth Takes 2 days per week Saturday and Sunday  0    Coenzyme Q10 (COQ10 PO) Take 1 capsule by mouth daily      B Complex Vitamins (VITAMIN B COMPLEX PO) Take 1 capsule by mouth daily      Cholecalciferol (VITAMIN D3) 25 MCG (1000 UT) CAPS Take 1,000 Units by mouth daily       levothyroxine (SYNTHROID) 75 MCG tablet Take 75 mcg by mouth Daily      valsartan (DIOVAN) 160 MG tablet Take 160 mg by mouth daily       No current facility-administered medications for this visit. No Known Allergies    Social History     Socioeconomic History    Marital status:      Spouse name: None    Number of children: None    Years of education: None    Highest education level: None   Occupational History    None   Tobacco Use    Smoking status: Former Smoker    Smokeless tobacco: Never Used   Vaping Use    Vaping Use: Never used   Substance and Sexual Activity    Alcohol use: No    Drug use: No    Sexual activity: None   Other Topics Concern    None   Social History Narrative    None     Social Determinants of Health     Financial Resource Strain:     Difficulty of Paying Living Expenses: Not on file   Food Insecurity:     Worried About Running Out of Food in the Last Year: Not on file    [place] of Food in the Last Year: Not on file   Transportation Needs:     Lack of Transportation (Medical): Not on file    Lack of Transportation (Non-Medical):  Not on file   Physical Activity:     Days of Exercise per Week: Not on file    Minutes of Exercise per Session: Not on file   Stress:     Feeling of Stress : Not on file   Social Connections:     Frequency of Communication with Friends and Family: Not on file    Frequency of Social Gatherings with Friends and Family: Not on file    Attends [employer] Services: Not on file    Active Member of Clubs or Organizations: Not on file    Attends Club or Organization Meetings: Not on file    Marital Status: Not on file   Intimate Partner Violence:     Fear of Current or Ex-Partner: Not on file    Emotionally Abused: Not on file    Physically Abused: Not on file    Sexually Abused: Not on file   Housing Stability:     Unable to Pay for Housing in the Last Year: Not on file    Number of [place] in the Last Year: Not on file    Unstable Housing in the Last Year: Not on file       History reviewed. No pertinent family history. Review of Systems   No fever, chills, or other constitutionalsymptoms. No numbness or other neuro symptoms. No chest pain. No dyspnea. [unfilled]   Alert oriented and comfortable presents in a wheelchair but is able to stand with only slight assistance with full weightbearing. Able to full weight-bear without much pain. Leg lengths are equal.  Right hip incision healing cleanly. Mild marginal staple erythema but no cellulitis drainage or other evidence of deep infection. Minimal discomfort with full right hip rotation. Staples removed from the incision. Physical Exam    Patient is alert and oriented. Well-developed well-nourished. Pupils equal and reactive. Scleraeanicteric. Neck supple  Lungs clear. Cardiac rate and rhythm regular. Abdomen soft and nontender. Skin warm and dry. XRAY: X-ray today AP pelvis with AP and lateral views right hip.   The 2 part intertrochanteric fracture remains anatomically aligned with visible compression and less visibility of the fracture line on both AP and lateral views compared to
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
## 2022-06-01 ENCOUNTER — OFFICE VISIT (OUTPATIENT)
Dept: ORTHOPEDIC SURGERY | Age: 87
End: 2022-06-01

## 2022-06-01 VITALS — WEIGHT: 122 LBS | HEIGHT: 63 IN | BODY MASS INDEX: 21.62 KG/M2

## 2022-06-01 DIAGNOSIS — S72.144D CLOSED NONDISPLACED INTERTROCHANTERIC FRACTURE OF RIGHT FEMUR WITH ROUTINE HEALING, SUBSEQUENT ENCOUNTER: Primary | ICD-10-CM

## 2022-06-01 PROCEDURE — 99024 POSTOP FOLLOW-UP VISIT: CPT | Performed by: ORTHOPAEDIC SURGERY

## 2022-06-01 RX ORDER — HYDROCODONE BITARTRATE AND ACETAMINOPHEN 5; 325 MG/1; MG/1
1 TABLET ORAL EVERY 8 HOURS PRN
COMMUNITY
Start: 2022-05-31

## 2022-06-01 NOTE — PROGRESS NOTES
Chief Complaint:   Chief Complaint   Patient presents with    Post-Op Check     FU Right hip fracture. OPEN REDUCTION INTERNAL FIXATION WITH PHS---SYNTHES 3/9/2022       HPI 80-year-old woman about 3 months after ORIF right hip intertrochanteric fracture with sliding hip screw. Patient states she has very little discomfort. She is getting around well with a walker and sometimes walks without it. This is confirmed by her son who accompanies her today. Patient Active Problem List   Diagnosis    Near syncope    Other chronic pain    Chest pain    Hypertension    Hyperlipidemia    Nausea    Closed nondisplaced intertrochanteric fracture of right femur (HCC)    Gastroesophageal reflux disease without esophagitis    Primary hypertension    Hypothyroidism       Past Medical History:   Diagnosis Date    Hyperlipidemia     Hypertension     Osteoporosis     Spinal stenosis     Thyroid disease        Past Surgical History:   Procedure Laterality Date    HERNIA REPAIR      HIP FRACTURE SURGERY Right 3/9/2022    RIGHT INTERTROCHANTERIC FEMUR FRACTURE  OPEN REDUCTION INTERNAL FIXATION WITH PHS---SYNTHES performed by Eddie Jin MD at [facility] OR       Current Outpatient Medications   Medication Sig Dispense Refill    HYDROcodone-acetaminophen (NORCO) 5-325 MG per tablet Take 1 tablet by mouth every 8 hours as needed.  gabapentin (NEURONTIN) 100 MG capsule take 3 capsules by mouth three times a day      Ascorbic Acid (VITAMIN C) 500 MG CAPS Take 1 capsule by mouth daily      oxyCODONE 5 MG capsule Take 5 mg by mouth every 6 hours as needed for Pain.       losartan (COZAAR) 100 MG tablet Take 100 mg by mouth daily      tiZANidine (ZANAFLEX) 2 MG tablet Take 1 tablet by mouth nightly as needed (left leg pain) 15 tablet 0    ondansetron (ZOFRAN) 4 MG tablet Take 1 tablet by mouth 3 times daily as needed for Nausea or Vomiting 15 tablet 0    valsartan (DIOVAN) 160 MG tablet Take 160 mg by mouth daily      pantoprazole (PROTONIX) 40 MG tablet Take 1 tablet by mouth every morning (before breakfast) 30 tablet 0    amphetamine-dextroamphetamine (ADDERALL XR) 10 MG extended release capsule take 1 capsule by mouth once daily  0    SYNTHROID 100 MCG tablet Take 100 mcg by mouth Takes 2 days per week Saturday and Sunday  0    Coenzyme Q10 (COQ10 PO) Take 1 capsule by mouth daily      B Complex Vitamins (VITAMIN B COMPLEX PO) Take 1 capsule by mouth daily      Cholecalciferol (VITAMIN D3) 25 MCG (1000 UT) CAPS Take 1,000 Units by mouth daily       levothyroxine (SYNTHROID) 75 MCG tablet Take 75 mcg by mouth Daily      aspirin 325 MG EC tablet Take 1 tablet by mouth 2 times daily for 28 days 56 tablet 0    albuterol sulfate HFA (VENTOLIN HFA) 108 (90 Base) MCG/ACT inhaler Inhale 2 puffs into the lungs 4 times daily as needed for Wheezing (Patient not taking: Reported on 6/1/2022) 1 Inhaler 0     No current facility-administered medications for this visit. No Known Allergies    Social History     Socioeconomic History    Marital status:      Spouse name: None    Number of children: None    Years of education: None    Highest education level: None   Occupational History    None   Tobacco Use    Smoking status: Former Smoker    Smokeless tobacco: Never Used   Vaping Use    Vaping Use: Never used   Substance and Sexual Activity    Alcohol use: No    Drug use: No    Sexual activity: None   Other Topics Concern    None   Social History Narrative    None     Social Determinants of Health     Financial Resource Strain:     Difficulty of Paying Living Expenses: Not on file   Food Insecurity:     Worried About Running Out of Food in the Last Year: Not on file    [place] of Food in the Last Year: Not on file   Transportation Needs:     Lack of Transportation (Medical): Not on file    Lack of Transportation (Non-Medical):  Not on file   Physical Activity:     Days of Exercise per Week: Not on file    Minutes of Exercise per Session: Not on file   Stress:     Feeling of Stress : Not on file   Social Connections:     Frequency of Communication with Friends and Family: Not on file    Frequency of Social Gatherings with Friends and Family: Not on file    Attends [employer]ian[employer] Services: Not on file    Active Member of Clubs or Organizations: Not on file    Attends Club or Organization Meetings: Not on file    Marital Status: Not on file   Intimate Partner Violence:     Fear of Current or Ex-Partner: Not on file    Emotionally Abused: Not on file    Physically Abused: Not on file    Sexually Abused: Not on file   Housing Stability:     Unable to Pay for Housing in the Last Year: Not on file    Number of [place] in the Last Year: Not on file    Unstable Housing in the Last Year: Not on file       History reviewed. No pertinent family history. Review of Systems   No fever, chills, or other constitutionalsymptoms. No numbness or other neuro symptoms. No chest pain. No dyspnea. [unfilled]   Patient is able to stand independently from a chair able to walk full weightbearing using her walker for balance. Minimal tenderness palpation lateral aspect right hip with well-healed surgical scar. Minimal discomfort with right hip full rotation. Physical Exam    Patient is alert and oriented. Well-developed well-nourished. Pupils equal and reactive. Scleraeanicteric. Neck supple  Lungs clear. Cardiac rate and rhythm regular. Abdomen soft and nontender. Significant lumbar scoliosis. Skin warm and dry. XRAY: X-ray today AP pelvis with AP and lateral views right hip. Right hip intertrochanteric fracture demonstrates evidence of union in anatomic alignment. Sliding hip screw in place with unchanged good position. No evidence of cut out or screw failure and femoral head architecture is intact.   There is noted to be significant degenerative osteoarthritis of the right hip which was present at the time of her injury. Impression: Healing right hip intertrochanteric fracture with intact fixation and anatomic alignment. Osteoarthritic changes right hip joint. ASSESSMENT/PLAN:    Dragan Rivera was seen today for post-op check. Diagnoses and all orders for this visit:    Closed nondisplaced intertrochanteric fracture of right femur with routine healing, subsequent encounter  -     XR HIP 2-3 VW W PELVIS RIGHT; Future    Clinically she is doing well. She should continue with her walker for safety. She should follow-up if any adverse change. Return if symptoms worsen or fail to improve.        Bruce More MD    6/1/2022  4:37 PM

## 2022-09-23 ENCOUNTER — APPOINTMENT (OUTPATIENT)
Dept: ULTRASOUND IMAGING | Age: 87
End: 2022-09-23
Payer: MEDICARE

## 2022-09-23 ENCOUNTER — HOSPITAL ENCOUNTER (EMERGENCY)
Age: 87
Discharge: HOME | End: 2022-09-23
Attending: EMERGENCY MEDICINE
Payer: MEDICARE

## 2022-09-23 ENCOUNTER — APPOINTMENT (OUTPATIENT)
Dept: CT IMAGING | Age: 87
End: 2022-09-23
Payer: MEDICARE

## 2022-09-23 ENCOUNTER — APPOINTMENT (OUTPATIENT)
Dept: GENERAL RADIOLOGY | Age: 87
End: 2022-09-23
Payer: MEDICARE

## 2022-09-23 VITALS
SYSTOLIC BLOOD PRESSURE: 163 MMHG | HEIGHT: 63 IN | OXYGEN SATURATION: 94 % | RESPIRATION RATE: 16 BRPM | DIASTOLIC BLOOD PRESSURE: 51 MMHG | HEART RATE: 69 BPM | BODY MASS INDEX: 17.72 KG/M2 | WEIGHT: 100 LBS | TEMPERATURE: 97.9 F

## 2022-09-23 DIAGNOSIS — R20.0 NUMBNESS: ICD-10-CM

## 2022-09-23 DIAGNOSIS — R07.89 ATYPICAL CHEST PAIN: Primary | ICD-10-CM

## 2022-09-23 LAB
ALBUMIN SERPL-MCNC: 4.4 G/DL (ref 3.5–5.2)
ALP BLD-CCNC: 57 U/L (ref 35–104)
ALT SERPL-CCNC: 11 U/L (ref 0–32)
ANION GAP SERPL CALCULATED.3IONS-SCNC: 14 MMOL/L (ref 7–16)
AST SERPL-CCNC: 17 U/L (ref 0–31)
BACTERIA: ABNORMAL /HPF
BASOPHILS ABSOLUTE: 0.07 E9/L (ref 0–0.2)
BASOPHILS RELATIVE PERCENT: 1 % (ref 0–2)
BILIRUB SERPL-MCNC: 0.3 MG/DL (ref 0–1.2)
BILIRUBIN URINE: NEGATIVE
BLOOD, URINE: NEGATIVE
BUN BLDV-MCNC: 18 MG/DL (ref 6–23)
CALCIUM SERPL-MCNC: 9.9 MG/DL (ref 8.6–10.2)
CHLORIDE BLD-SCNC: 98 MMOL/L (ref 98–107)
CLARITY: CLEAR
CO2: 23 MMOL/L (ref 22–29)
COLOR: YELLOW
CREAT SERPL-MCNC: 0.7 MG/DL (ref 0.5–1)
EKG ATRIAL RATE: 72 BPM
EKG P AXIS: 82 DEGREES
EKG P-R INTERVAL: 184 MS
EKG Q-T INTERVAL: 372 MS
EKG QRS DURATION: 74 MS
EKG QTC CALCULATION (BAZETT): 407 MS
EKG R AXIS: -2 DEGREES
EKG T AXIS: 53 DEGREES
EKG VENTRICULAR RATE: 72 BPM
EOSINOPHILS ABSOLUTE: 0.11 E9/L (ref 0.05–0.5)
EOSINOPHILS RELATIVE PERCENT: 1.6 % (ref 0–6)
EPITHELIAL CELLS, UA: ABNORMAL /HPF
GFR AFRICAN AMERICAN: >60
GFR NON-AFRICAN AMERICAN: >60 ML/MIN/1.73
GLUCOSE BLD-MCNC: 94 MG/DL (ref 74–99)
GLUCOSE URINE: NEGATIVE MG/DL
HCT VFR BLD CALC: 38.8 % (ref 34–48)
HEMOGLOBIN: 12.4 G/DL (ref 11.5–15.5)
IMMATURE GRANULOCYTES #: 0.02 E9/L
IMMATURE GRANULOCYTES %: 0.3 % (ref 0–5)
KETONES, URINE: ABNORMAL MG/DL
LEUKOCYTE ESTERASE, URINE: NEGATIVE
LYMPHOCYTES ABSOLUTE: 0.91 E9/L (ref 1.5–4)
LYMPHOCYTES RELATIVE PERCENT: 13 % (ref 20–42)
MCH RBC QN AUTO: 27.7 PG (ref 26–35)
MCHC RBC AUTO-ENTMCNC: 32 % (ref 32–34.5)
MCV RBC AUTO: 86.6 FL (ref 80–99.9)
MONOCYTES ABSOLUTE: 0.53 E9/L (ref 0.1–0.95)
MONOCYTES RELATIVE PERCENT: 7.6 % (ref 2–12)
NEUTROPHILS ABSOLUTE: 5.34 E9/L (ref 1.8–7.3)
NEUTROPHILS RELATIVE PERCENT: 76.5 % (ref 43–80)
NITRITE, URINE: NEGATIVE
PDW BLD-RTO: 19.1 FL (ref 11.5–15)
PH UA: 6 (ref 5–9)
PLATELET # BLD: 250 E9/L (ref 130–450)
PMV BLD AUTO: 9.2 FL (ref 7–12)
POTASSIUM REFLEX MAGNESIUM: 3.9 MMOL/L (ref 3.5–5)
PRO-BNP: 270 PG/ML (ref 0–450)
PROTEIN UA: NEGATIVE MG/DL
RBC # BLD: 4.48 E12/L (ref 3.5–5.5)
RBC UA: ABNORMAL /HPF (ref 0–2)
SODIUM BLD-SCNC: 135 MMOL/L (ref 132–146)
SPECIFIC GRAVITY UA: 1.01 (ref 1–1.03)
TOTAL PROTEIN: 7.4 G/DL (ref 6.4–8.3)
TROPONIN, HIGH SENSITIVITY: 14 NG/L (ref 0–9)
TROPONIN, HIGH SENSITIVITY: 17 NG/L (ref 0–9)
UROBILINOGEN, URINE: 0.2 E.U./DL
WBC # BLD: 7 E9/L (ref 4.5–11.5)
WBC UA: ABNORMAL /HPF (ref 0–5)

## 2022-09-23 PROCEDURE — 99285 EMERGENCY DEPT VISIT HI MDM: CPT

## 2022-09-23 PROCEDURE — 85025 COMPLETE CBC W/AUTO DIFF WBC: CPT

## 2022-09-23 PROCEDURE — 83880 ASSAY OF NATRIURETIC PEPTIDE: CPT

## 2022-09-23 PROCEDURE — 80053 COMPREHEN METABOLIC PANEL: CPT

## 2022-09-23 PROCEDURE — 93005 ELECTROCARDIOGRAM TRACING: CPT | Performed by: PHYSICIAN ASSISTANT

## 2022-09-23 PROCEDURE — 71045 X-RAY EXAM CHEST 1 VIEW: CPT

## 2022-09-23 PROCEDURE — 2580000003 HC RX 258: Performed by: RADIOLOGY

## 2022-09-23 PROCEDURE — 6370000000 HC RX 637 (ALT 250 FOR IP)

## 2022-09-23 PROCEDURE — 6360000004 HC RX CONTRAST MEDICATION: Performed by: RADIOLOGY

## 2022-09-23 PROCEDURE — 84484 ASSAY OF TROPONIN QUANT: CPT

## 2022-09-23 PROCEDURE — 71275 CT ANGIOGRAPHY CHEST: CPT

## 2022-09-23 PROCEDURE — 93970 EXTREMITY STUDY: CPT

## 2022-09-23 PROCEDURE — 81001 URINALYSIS AUTO W/SCOPE: CPT

## 2022-09-23 RX ORDER — SODIUM CHLORIDE 0.9 % (FLUSH) 0.9 %
10 SYRINGE (ML) INJECTION PRN
Status: COMPLETED | OUTPATIENT
Start: 2022-09-23 | End: 2022-09-23

## 2022-09-23 RX ORDER — ONDANSETRON 4 MG/1
4 TABLET, ORALLY DISINTEGRATING ORAL ONCE
Status: COMPLETED | OUTPATIENT
Start: 2022-09-23 | End: 2022-09-23

## 2022-09-23 RX ADMIN — LIDOCAINE HYDROCHLORIDE: 20 SOLUTION ORAL; TOPICAL at 13:48

## 2022-09-23 RX ADMIN — ONDANSETRON 4 MG: 4 TABLET, ORALLY DISINTEGRATING ORAL at 11:12

## 2022-09-23 RX ADMIN — IOPAMIDOL 60 ML: 755 INJECTION, SOLUTION INTRAVENOUS at 12:28

## 2022-09-23 RX ADMIN — SODIUM CHLORIDE, PRESERVATIVE FREE 10 ML: 5 INJECTION INTRAVENOUS at 12:25

## 2022-09-23 ASSESSMENT — ENCOUNTER SYMPTOMS
SORE THROAT: 0
COUGH: 0
SINUS PRESSURE: 0
WHEEZING: 0
SHORTNESS OF BREATH: 0
PHOTOPHOBIA: 0
BLOOD IN STOOL: 0
CONSTIPATION: 0
DIARRHEA: 0
BACK PAIN: 0
VOMITING: 0
NAUSEA: 1
EYE REDNESS: 0
RHINORRHEA: 0
ABDOMINAL DISTENTION: 0
EYE DISCHARGE: 0
ABDOMINAL PAIN: 0

## 2022-09-23 ASSESSMENT — PAIN SCALES - GENERAL: PAINLEVEL_OUTOF10: 5

## 2022-09-23 ASSESSMENT — PAIN - FUNCTIONAL ASSESSMENT: PAIN_FUNCTIONAL_ASSESSMENT: 0-10

## 2022-09-23 ASSESSMENT — PAIN DESCRIPTION - LOCATION: LOCATION: CHEST

## 2022-09-23 ASSESSMENT — PAIN DESCRIPTION - DESCRIPTORS: DESCRIPTORS: NAGGING

## 2022-09-23 NOTE — ED PROVIDER NOTES
[facility]  Department of Emergency Medicine     Written by: Carlos Lux MD  Patient Name: Makeda Moon  Attending Provider: Scott Marte DO  Admit Date: 2022 10:23 AM  MRN: [id number]                   : 1931        Chief Complaint   Patient presents with    Fatigue    Leg Pain     Numbness.  Chest Pain    - Chief complaint    Patient is a 15-year-old female with past medical history of hypertension, hyperlipidemia, and a surgical history of ORIF for right hip intertrochanteric fracture with sliding hip screw on 3/9/2022 by Dr. Sandra Hill presenting with chest pain, fatigue, and bilateral numbness in the legs. Patient's chest pain has been ongoing for 4 days, described as dull, located in the left anterior chest below the left breast, nonradiating, worse with exertion and on deep inspiration, constant, not relieved by rest, and the patient has not taken over-the-counter medications for this. Patient has associated nausea and blurry vision. Patient is also endorsing bilateral numbness below the knees to the feet. She describes feelings of pins-and-needles. Patient is also describing chronic fatigue which has been worsening over the last 4 days. Patient was diagnosed with anemia and started on iron supplementation. Patient denies anticoagulation use. Patient denies smoking, alcohol use, or drug use. Patient denies neck pain, back pain, arm pain, lightheadedness, dizziness, loss of sensation, weakness, headache, fevers, cough, sore throat, shortness of breath, vomiting, abdominal pain, hematuria, dysuria, increasing or decreasing urinary frequency, vaginal bleeding/discharge, blood in stool, constipation, diarrhea, leg pain, leg swelling, recent travel, recent surgery, recent illness, or sick contacts. Review of Systems   Constitutional:  Negative for activity change, chills, fatigue and fever.    HENT:  Negative for congestion, postnasal drip, rhinorrhea, sinus pressure and sore throat. Eyes:  Positive for visual disturbance. Negative for photophobia, discharge and redness. Respiratory:  Negative for cough, shortness of breath and wheezing. Cardiovascular:  Positive for chest pain. Negative for palpitations and leg swelling. Gastrointestinal:  Positive for nausea. Negative for abdominal distention, abdominal pain, blood in stool, constipation, diarrhea and vomiting. Endocrine: Negative for cold intolerance and heat intolerance. Genitourinary:  Negative for decreased urine volume, difficulty urinating, dysuria, flank pain, frequency, hematuria and urgency. Musculoskeletal:  Negative for arthralgias, back pain, joint swelling, myalgias, neck pain and neck stiffness. Skin:  Negative for rash and wound. Allergic/Immunologic: Negative for immunocompromised state. Neurological:  Positive for numbness. Negative for dizziness, syncope, facial asymmetry, weakness, light-headedness and headaches. Hematological:  Does not bruise/bleed easily. Psychiatric/Behavioral:  Negative for behavioral problems and hallucinations. Physical Exam  Constitutional:       General: She is not in acute distress. Appearance: Normal appearance. She is not ill-appearing, toxic-appearing or diaphoretic. HENT:      Head: Normocephalic and atraumatic. Right Ear: Hearing normal.      Left Ear: Hearing normal.      Nose: Nose normal.      Mouth/Throat:      Lips: Pink. Mouth: Mucous membranes are moist.      Pharynx: Oropharynx is clear. Eyes:      Extraocular Movements: Extraocular movements intact. Conjunctiva/sclera: Conjunctivae normal.      Pupils: Pupils are equal, round, and reactive to light. Cardiovascular:      Rate and Rhythm: Normal rate and regular rhythm. Pulses: Normal pulses. Radial pulses are 2+ on the right side and 2+ on the left side.       Heart sounds: S1 normal and S2 normal.   Pulmonary: Effort: Pulmonary effort is normal. No tachypnea, accessory muscle usage or respiratory distress. Breath sounds: Normal breath sounds and air entry. No decreased breath sounds, wheezing, rhonchi or rales. Abdominal:      General: Abdomen is flat. Bowel sounds are normal. There is no distension. Palpations: Abdomen is soft. There is no fluid wave or mass. Tenderness: There is no abdominal tenderness. There is no right CVA tenderness, left CVA tenderness or guarding. Musculoskeletal:         General: No swelling or tenderness. Normal range of motion. Cervical back: Normal range of motion and neck supple. No rigidity. Right lower leg: No edema. Left lower leg: No edema. Lymphadenopathy:      Cervical: No cervical adenopathy. Skin:     General: Skin is warm and dry. Capillary Refill: Capillary refill takes less than 2 seconds. Findings: No bruising, lesion or rash. Neurological:      General: No focal deficit present. Mental Status: She is alert and oriented to person, place, and time. Mental status is at baseline. Motor: No weakness. Psychiatric:         Attention and Perception: Attention normal.         Mood and Affect: Mood and affect normal.         Behavior: Behavior is cooperative.         EKG Interpretation    Interpreted by emergency department physician    Rhythm: normal sinus   Rate: normal  Axis: left  Ectopy: none  Conduction: normal  ST Segments: no acute change  T Waves: no acute change  Q Waves: none    Clinical Impression: no acute changes or with prior EKG on 3/7/2022    Procedures       MDM  Number of Diagnoses or Management Options  Atypical chest pain  Numbness  Diagnosis management comments: Patient is a 63-year-old female with past medical history of hypertension, hyperlipidemia, and a surgical history of ORIF for right hip intertrochanteric fracture with sliding hip screw on 3/9/2022 by Dr. Feliciano Watkins presenting with chest pain, fatigue, [id number] Was again reevaluated after GI cocktail administration and patient is feeling significant improvement. Patient is ready for discharge at this time. We will get in touch with Dr. Lavell Copeland, patient's primary care physician, to touch base and possibly schedule outpatient follow-up. [VG]   1423 Case was discussed with Dr. Lavell Copeland, patient's primary care physician, who agrees for outpatient follow-up and states that the patient normally has numerous symptoms and that her symptoms now are consistent with her baseline. [VG]      ED Course User Index  [BP] Suzi Santos DO  [VG] Janan Libman, MD       --------------------------------------------- PAST HISTORY ---------------------------------------------  Past Medical History:  has a past medical history of Hyperlipidemia, Hypertension, Osteoporosis, Spinal stenosis, and Thyroid disease. Past Surgical History:  has a past surgical history that includes hernia repair and Hip fracture surgery (Right, 3/9/2022). Social History:  reports that she has quit smoking. She has never used smokeless tobacco. She reports that she does not drink alcohol and does not use drugs. Family History: family history is not on file. The patients home medications have been reviewed. Allergies: Patient has no known allergies.     -------------------------------------------------- RESULTS -------------------------------------------------  Labs:  Results for orders placed or performed during the hospital encounter of 09/23/22   CBC with Auto Differential   Result Value Ref Range    WBC 7.0 4.5 - 11.5 E9/L    RBC 4.48 3.50 - 5.50 E12/L    Hemoglobin 12.4 11.5 - 15.5 g/dL    Hematocrit 38.8 34.0 - 48.0 %    MCV 86.6 80.0 - 99.9 fL    MCH 27.7 26.0 - 35.0 pg    MCHC 32.0 32.0 - 34.5 %    RDW 19.1 (H) 11.5 - 15.0 fL    Platelets 976 689 - 009 E9/L    MPV 9.2 7.0 - 12.0 fL    Neutrophils % 76.5 43.0 - 80.0 %    Immature Granulocytes % 0.3 0.0 - 5.0 %    Lymphocytes % 13.0 (L) 20.0 - 42.0 %    Monocytes % 7.6 2.0 - 12.0 %    Eosinophils % 1.6 0.0 - 6.0 %    Basophils % 1.0 0.0 - 2.0 %    Neutrophils Absolute 5.34 1.80 - 7.30 E9/L    Immature Granulocytes # 0.02 E9/L    Lymphocytes Absolute 0.91 (L) 1.50 - 4.00 E9/L    Monocytes Absolute 0.53 0.10 - 0.95 E9/L    Eosinophils Absolute 0.11 0.05 - 0.50 E9/L    Basophils Absolute 0.07 0.00 - 0.20 E9/L   Comprehensive Metabolic Panel w/ Reflex to MG   Result Value Ref Range    Sodium 135 132 - 146 mmol/L    Potassium reflex Magnesium 3.9 3.5 - 5.0 mmol/L    Chloride 98 98 - 107 mmol/L    CO2 23 22 - 29 mmol/L    Anion Gap 14 7 - 16 mmol/L    Glucose 94 74 - 99 mg/dL    BUN 18 6 - 23 mg/dL    Creatinine 0.7 0.5 - 1.0 mg/dL    GFR Non-African American >60 >=60 mL/min/1.73    GFR African American >60     Calcium 9.9 8.6 - 10.2 mg/dL    Total Protein 7.4 6.4 - 8.3 g/dL    Albumin 4.4 3.5 - 5.2 g/dL    Total Bilirubin 0.3 0.0 - 1.2 mg/dL    Alkaline Phosphatase 57 35 - 104 U/L    ALT 11 0 - 32 U/L    AST 17 0 - 31 U/L   Troponin   Result Value Ref Range    Troponin, High Sensitivity 17 (H) 0 - 9 ng/L   Brain Natriuretic Peptide   Result Value Ref Range    Pro- 0 - 450 pg/mL   Urinalysis with Microscopic   Result Value Ref Range    Color, UA Yellow Straw/Yellow    Clarity, UA Clear Clear    Glucose, Ur Negative Negative mg/dL    Bilirubin Urine Negative Negative    Ketones, Urine TRACE (A) Negative mg/dL    Specific Gravity, UA 1.010 1.005 - 1.030    Blood, Urine Negative Negative    pH, UA 6.0 5.0 - 9.0    Protein, UA Negative Negative mg/dL    Urobilinogen, Urine 0.2 <2.0 E.U./dL    Nitrite, Urine Negative Negative    Leukocyte Esterase, Urine Negative Negative    WBC, UA NONE 0 - 5 /HPF    RBC, UA NONE 0 - 2 /HPF    Epithelial Cells, UA MANY /HPF    Bacteria, UA NONE SEEN None Seen /HPF   Troponin   Result Value Ref Range    Troponin, High Sensitivity 14 (H) 0 - 9 ng/L   EKG 12 Lead   Result Value Ref Range    Ventricular Rate 72 BPM    Atrial Rate 72 BPM    P-R Interval 184 ms    QRS Duration 74 ms    Q-T Interval 372 ms    QTc Calculation (Bazett) 407 ms    P Axis 82 degrees    R Axis -2 degrees    T Axis 53 degrees       Radiology:  CTA PULMONARY W CONTRAST   Final Result   1. There is no pulmonary embolus   2. Chronic elevation right hemidiaphragm with adjacent compression atelectasis   3. Moderate sized hiatal hernia. 4. There are no findings of failure or pneumonia. US DUP LOWER EXTREMITIES BILATERAL VENOUS   Final Result   No evidence of DVT in either lower extremity. XR CHEST PORTABLE   Final Result   No acute process. ------------------------- NURSING NOTES AND VITALS REVIEWED ---------------------------  Date / Time Roomed:  9/23/2022 10:23 AM  ED Bed Assignment:  21/21    The nursing notes within the ED encounter and vital signs as below have been reviewed. BP (!) 163/51   Pulse 69   Temp 97.9 °F (36.6 °C) (Oral)   Resp 16   Ht 5' 3\" (1.6 m)   Wt 100 lb (45.4 kg)   SpO2 94%   BMI 17.71 kg/m²   Oxygen Saturation Interpretation: Normal      ------------------------------------------ PROGRESS NOTES ------------------------------------------  I have spoken with the patient and discussed todays results, in addition to providing specific details for the plan of care and counseling regarding the diagnosis and prognosis. Their questions are answered at this time and they are agreeable with the plan. I discussed at length with them reasons for immediate return here for re evaluation. They will followup with primary care by calling their office tomorrow. --------------------------------- ADDITIONAL PROVIDER NOTES ---------------------------------  At this time the patient is without objective evidence of an acute process requiring hospitalization or inpatient management. They have remained hemodynamically stable throughout their entire ED visit and are stable for discharge with outpatient follow-up.      The plan has been discussed in detail and they are aware of the specific conditions for emergent return, as well as the importance of follow-up. Discharge Medication List as of 9/23/2022  2:25 PM          Diagnosis:  1. Atypical chest pain    2. Numbness        Disposition:  Patient's disposition: Discharge to home  Patient's condition is stable. Patient was seen and evaluated by myself and my attending Ana Ibarra DO. Assessment and Plan discussed with attending provider, please see attestation for final plan of care.      MD Damari Stafford MD  Resident  09/23/22 [id number]

## 2022-09-23 NOTE — DISCHARGE INSTRUCTIONS
Please continue taking your home medications as they are prescribed. It is important that you follow-up with your primary care physician to follow-up on your symptoms and to discuss your recent emergency room visit. Please return to emergency room if you experience worsening of your symptoms, severe chest pain, severe shortness of breath, severe abdominal pain, severe nausea with vomiting, severe headache, severe numbness, loss of sensation, weakness, fall, or fevers greater than 100.4 °F.

## 2023-01-27 ENCOUNTER — HOSPITAL ENCOUNTER (OUTPATIENT)
Age: 88
Setting detail: OBSERVATION
LOS: 1 days | Discharge: HOME | End: 2023-01-29
Attending: EMERGENCY MEDICINE | Admitting: INTERNAL MEDICINE
Payer: MEDICARE

## 2023-01-27 DIAGNOSIS — R11.0 NAUSEA: ICD-10-CM

## 2023-01-27 DIAGNOSIS — J96.01 ACUTE RESPIRATORY FAILURE WITH HYPOXIA (HCC): ICD-10-CM

## 2023-01-27 DIAGNOSIS — J18.9 PNEUMONIA DUE TO INFECTIOUS ORGANISM, UNSPECIFIED LATERALITY, UNSPECIFIED PART OF LUNG: Primary | ICD-10-CM

## 2023-01-27 PROCEDURE — 96374 THER/PROPH/DIAG INJ IV PUSH: CPT

## 2023-01-27 PROCEDURE — 99285 EMERGENCY DEPT VISIT HI MDM: CPT

## 2023-01-27 PROCEDURE — 96375 TX/PRO/DX INJ NEW DRUG ADDON: CPT

## 2023-01-27 RX ORDER — ONDANSETRON 2 MG/ML
4 INJECTION INTRAMUSCULAR; INTRAVENOUS ONCE
Status: COMPLETED | OUTPATIENT
Start: 2023-01-28 | End: 2023-01-28

## 2023-01-27 ASSESSMENT — PAIN DESCRIPTION - LOCATION: LOCATION: RIB CAGE

## 2023-01-27 ASSESSMENT — PAIN SCALES - GENERAL: PAINLEVEL_OUTOF10: 7

## 2023-01-27 ASSESSMENT — PAIN DESCRIPTION - ORIENTATION: ORIENTATION: RIGHT

## 2023-01-27 ASSESSMENT — PAIN - FUNCTIONAL ASSESSMENT: PAIN_FUNCTIONAL_ASSESSMENT: 0-10

## 2023-01-28 ENCOUNTER — APPOINTMENT (OUTPATIENT)
Dept: GENERAL RADIOLOGY | Age: 88
End: 2023-01-28
Payer: MEDICARE

## 2023-01-28 ENCOUNTER — APPOINTMENT (OUTPATIENT)
Dept: CT IMAGING | Age: 88
End: 2023-01-28
Payer: MEDICARE

## 2023-01-28 PROBLEM — R07.81 RIB PAIN: Status: ACTIVE | Noted: 2023-01-28

## 2023-01-28 PROBLEM — J96.01 ACUTE RESPIRATORY FAILURE WITH HYPOXIA (HCC): Status: ACTIVE | Noted: 2023-01-28

## 2023-01-28 LAB
ALBUMIN SERPL-MCNC: 4.4 G/DL (ref 3.5–5.2)
ALP BLD-CCNC: 52 U/L (ref 35–104)
ALT SERPL-CCNC: 10 U/L (ref 0–32)
ANION GAP SERPL CALCULATED.3IONS-SCNC: 11 MMOL/L (ref 7–16)
AST SERPL-CCNC: 14 U/L (ref 0–31)
BASOPHILS ABSOLUTE: 0.03 E9/L (ref 0–0.2)
BASOPHILS RELATIVE PERCENT: 0.3 % (ref 0–2)
BILIRUB SERPL-MCNC: 0.4 MG/DL (ref 0–1.2)
BUN BLDV-MCNC: 17 MG/DL (ref 6–23)
CALCIUM SERPL-MCNC: 9.9 MG/DL (ref 8.6–10.2)
CHLORIDE BLD-SCNC: 101 MMOL/L (ref 98–107)
CO2: 26 MMOL/L (ref 22–29)
CREAT SERPL-MCNC: 0.6 MG/DL (ref 0.5–1)
D DIMER: 342 NG/ML DDU
EKG ATRIAL RATE: 81 BPM
EKG P-R INTERVAL: 174 MS
EKG Q-T INTERVAL: 368 MS
EKG QRS DURATION: 76 MS
EKG QTC CALCULATION (BAZETT): 427 MS
EKG R AXIS: -7 DEGREES
EKG VENTRICULAR RATE: 81 BPM
EOSINOPHILS ABSOLUTE: 0.01 E9/L (ref 0.05–0.5)
EOSINOPHILS RELATIVE PERCENT: 0.1 % (ref 0–6)
GFR SERPL CREATININE-BSD FRML MDRD: >60 ML/MIN/1.73
GLUCOSE BLD-MCNC: 121 MG/DL (ref 74–99)
HCT VFR BLD CALC: 38.6 % (ref 34–48)
HEMOGLOBIN: 12.4 G/DL (ref 11.5–15.5)
IMMATURE GRANULOCYTES #: 0.05 E9/L
IMMATURE GRANULOCYTES %: 0.4 % (ref 0–5)
INFLUENZA A BY PCR: NOT DETECTED
INFLUENZA B BY PCR: NOT DETECTED
LYMPHOCYTES ABSOLUTE: 0.86 E9/L (ref 1.5–4)
LYMPHOCYTES RELATIVE PERCENT: 7.4 % (ref 20–42)
MCH RBC QN AUTO: 29.6 PG (ref 26–35)
MCHC RBC AUTO-ENTMCNC: 32.1 % (ref 32–34.5)
MCV RBC AUTO: 92.1 FL (ref 80–99.9)
MONOCYTES ABSOLUTE: 0.76 E9/L (ref 0.1–0.95)
MONOCYTES RELATIVE PERCENT: 6.6 % (ref 2–12)
NEUTROPHILS ABSOLUTE: 9.84 E9/L (ref 1.8–7.3)
NEUTROPHILS RELATIVE PERCENT: 85.2 % (ref 43–80)
PDW BLD-RTO: 14.8 FL (ref 11.5–15)
PLATELET # BLD: 248 E9/L (ref 130–450)
PMV BLD AUTO: 8.9 FL (ref 7–12)
POTASSIUM SERPL-SCNC: 4.6 MMOL/L (ref 3.5–5)
RBC # BLD: 4.19 E12/L (ref 3.5–5.5)
SARS-COV-2, NAAT: NOT DETECTED
SODIUM BLD-SCNC: 138 MMOL/L (ref 132–146)
TOTAL PROTEIN: 7.4 G/DL (ref 6.4–8.3)
TROPONIN, HIGH SENSITIVITY: 15 NG/L (ref 0–9)
TROPONIN, HIGH SENSITIVITY: 17 NG/L (ref 0–9)
WBC # BLD: 11.6 E9/L (ref 4.5–11.5)

## 2023-01-28 PROCEDURE — 96375 TX/PRO/DX INJ NEW DRUG ADDON: CPT

## 2023-01-28 PROCEDURE — 87635 SARS-COV-2 COVID-19 AMP PRB: CPT

## 2023-01-28 PROCEDURE — 6360000004 HC RX CONTRAST MEDICATION: Performed by: RADIOLOGY

## 2023-01-28 PROCEDURE — 71275 CT ANGIOGRAPHY CHEST: CPT

## 2023-01-28 PROCEDURE — 6370000000 HC RX 637 (ALT 250 FOR IP): Performed by: STUDENT IN AN ORGANIZED HEALTH CARE EDUCATION/TRAINING PROGRAM

## 2023-01-28 PROCEDURE — 71045 X-RAY EXAM CHEST 1 VIEW: CPT

## 2023-01-28 PROCEDURE — 2580000003 HC RX 258: Performed by: INTERNAL MEDICINE

## 2023-01-28 PROCEDURE — 84484 ASSAY OF TROPONIN QUANT: CPT

## 2023-01-28 PROCEDURE — 85025 COMPLETE CBC W/AUTO DIFF WBC: CPT

## 2023-01-28 PROCEDURE — 93005 ELECTROCARDIOGRAM TRACING: CPT | Performed by: STUDENT IN AN ORGANIZED HEALTH CARE EDUCATION/TRAINING PROGRAM

## 2023-01-28 PROCEDURE — 96366 THER/PROPH/DIAG IV INF ADDON: CPT

## 2023-01-28 PROCEDURE — 2700000000 HC OXYGEN THERAPY PER DAY

## 2023-01-28 PROCEDURE — 2580000003 HC RX 258: Performed by: STUDENT IN AN ORGANIZED HEALTH CARE EDUCATION/TRAINING PROGRAM

## 2023-01-28 PROCEDURE — 93010 ELECTROCARDIOGRAM REPORT: CPT | Performed by: INTERNAL MEDICINE

## 2023-01-28 PROCEDURE — 6370000000 HC RX 637 (ALT 250 FOR IP): Performed by: INTERNAL MEDICINE

## 2023-01-28 PROCEDURE — 80053 COMPREHEN METABOLIC PANEL: CPT

## 2023-01-28 PROCEDURE — G0378 HOSPITAL OBSERVATION PER HR: HCPCS

## 2023-01-28 PROCEDURE — 2500000003 HC RX 250 WO HCPCS: Performed by: STUDENT IN AN ORGANIZED HEALTH CARE EDUCATION/TRAINING PROGRAM

## 2023-01-28 PROCEDURE — 6360000002 HC RX W HCPCS: Performed by: STUDENT IN AN ORGANIZED HEALTH CARE EDUCATION/TRAINING PROGRAM

## 2023-01-28 PROCEDURE — 87502 INFLUENZA DNA AMP PROBE: CPT

## 2023-01-28 PROCEDURE — 96365 THER/PROPH/DIAG IV INF INIT: CPT

## 2023-01-28 PROCEDURE — 85378 FIBRIN DEGRADE SEMIQUANT: CPT

## 2023-01-28 RX ORDER — GABAPENTIN 100 MG/1
100 CAPSULE ORAL 3 TIMES DAILY
Status: DISCONTINUED | OUTPATIENT
Start: 2023-01-28 | End: 2023-01-29 | Stop reason: HOSPADM

## 2023-01-28 RX ORDER — ENOXAPARIN SODIUM 100 MG/ML
30 INJECTION SUBCUTANEOUS DAILY
Status: DISCONTINUED | OUTPATIENT
Start: 2023-01-28 | End: 2023-01-29 | Stop reason: HOSPADM

## 2023-01-28 RX ORDER — ERYTHROMYCIN 5 MG/G
OINTMENT OPHTHALMIC NIGHTLY
COMMUNITY

## 2023-01-28 RX ORDER — CETIRIZINE HYDROCHLORIDE 10 MG/1
10 TABLET ORAL 2 TIMES DAILY
COMMUNITY

## 2023-01-28 RX ORDER — HYDROCODONE BITARTRATE AND ACETAMINOPHEN 5; 325 MG/1; MG/1
1 TABLET ORAL EVERY 8 HOURS PRN
Status: DISCONTINUED | OUTPATIENT
Start: 2023-01-28 | End: 2023-01-29 | Stop reason: HOSPADM

## 2023-01-28 RX ORDER — SODIUM CHLORIDE 0.9 % (FLUSH) 0.9 %
5-40 SYRINGE (ML) INJECTION PRN
Status: DISCONTINUED | OUTPATIENT
Start: 2023-01-28 | End: 2023-01-29 | Stop reason: HOSPADM

## 2023-01-28 RX ORDER — LEVOTHYROXINE SODIUM 0.1 MG/1
100 TABLET ORAL DAILY
Status: DISCONTINUED | OUTPATIENT
Start: 2023-01-28 | End: 2023-01-29 | Stop reason: HOSPADM

## 2023-01-28 RX ORDER — HYDROCODONE BITARTRATE AND ACETAMINOPHEN 5; 325 MG/1; MG/1
1 TABLET ORAL ONCE
Status: COMPLETED | OUTPATIENT
Start: 2023-01-28 | End: 2023-01-28

## 2023-01-28 RX ORDER — ACETAMINOPHEN 325 MG/1
650 TABLET ORAL EVERY 6 HOURS PRN
Status: DISCONTINUED | OUTPATIENT
Start: 2023-01-28 | End: 2023-01-29 | Stop reason: HOSPADM

## 2023-01-28 RX ORDER — ONDANSETRON 2 MG/ML
4 INJECTION INTRAMUSCULAR; INTRAVENOUS EVERY 6 HOURS PRN
Status: DISCONTINUED | OUTPATIENT
Start: 2023-01-28 | End: 2023-01-29 | Stop reason: HOSPADM

## 2023-01-28 RX ORDER — HYDROCODONE BITARTRATE AND ACETAMINOPHEN 7.5; 325 MG/1; MG/1
1 TABLET ORAL ONCE
Status: COMPLETED | OUTPATIENT
Start: 2023-01-28 | End: 2023-01-28

## 2023-01-28 RX ORDER — ACETAMINOPHEN 650 MG/1
650 SUPPOSITORY RECTAL EVERY 6 HOURS PRN
Status: DISCONTINUED | OUTPATIENT
Start: 2023-01-28 | End: 2023-01-29 | Stop reason: HOSPADM

## 2023-01-28 RX ORDER — SODIUM CHLORIDE 9 MG/ML
INJECTION, SOLUTION INTRAVENOUS PRN
Status: DISCONTINUED | OUTPATIENT
Start: 2023-01-28 | End: 2023-01-29 | Stop reason: HOSPADM

## 2023-01-28 RX ORDER — ASPIRIN 81 MG/1
81 TABLET, CHEWABLE ORAL
Status: DISCONTINUED | OUTPATIENT
Start: 2023-01-28 | End: 2023-01-29 | Stop reason: HOSPADM

## 2023-01-28 RX ORDER — LISINOPRIL 5 MG/1
5 TABLET ORAL DAILY
COMMUNITY

## 2023-01-28 RX ORDER — SODIUM CHLORIDE 0.9 % (FLUSH) 0.9 %
5-40 SYRINGE (ML) INJECTION EVERY 12 HOURS SCHEDULED
Status: DISCONTINUED | OUTPATIENT
Start: 2023-01-28 | End: 2023-01-29 | Stop reason: HOSPADM

## 2023-01-28 RX ORDER — POLYETHYLENE GLYCOL 3350 17 G/17G
17 POWDER, FOR SOLUTION ORAL DAILY PRN
Status: DISCONTINUED | OUTPATIENT
Start: 2023-01-28 | End: 2023-01-29 | Stop reason: HOSPADM

## 2023-01-28 RX ORDER — LOSARTAN POTASSIUM 50 MG/1
100 TABLET ORAL DAILY
Status: DISCONTINUED | OUTPATIENT
Start: 2023-01-28 | End: 2023-01-29 | Stop reason: HOSPADM

## 2023-01-28 RX ORDER — ONDANSETRON 4 MG/1
4 TABLET, ORALLY DISINTEGRATING ORAL EVERY 8 HOURS PRN
Status: DISCONTINUED | OUTPATIENT
Start: 2023-01-28 | End: 2023-01-29 | Stop reason: HOSPADM

## 2023-01-28 RX ORDER — HYDROCODONE BITARTRATE AND ACETAMINOPHEN 7.5; 325 MG/1; MG/1
1 TABLET ORAL 4 TIMES DAILY PRN
COMMUNITY

## 2023-01-28 RX ADMIN — LEVOTHYROXINE SODIUM 100 MCG: 100 TABLET ORAL at 14:50

## 2023-01-28 RX ADMIN — DOXYCYCLINE 100 MG: 100 INJECTION, POWDER, LYOPHILIZED, FOR SOLUTION INTRAVENOUS at 02:14

## 2023-01-28 RX ADMIN — GABAPENTIN 100 MG: 100 CAPSULE ORAL at 19:53

## 2023-01-28 RX ADMIN — ONDANSETRON 4 MG: 2 INJECTION INTRAMUSCULAR; INTRAVENOUS at 01:07

## 2023-01-28 RX ADMIN — LOSARTAN POTASSIUM 100 MG: 50 TABLET, FILM COATED ORAL at 14:50

## 2023-01-28 RX ADMIN — ASPIRIN 81 MG CHEWABLE TABLET 81 MG: 81 TABLET CHEWABLE at 20:00

## 2023-01-28 RX ADMIN — GABAPENTIN 100 MG: 100 CAPSULE ORAL at 14:50

## 2023-01-28 RX ADMIN — HYDROCODONE BITARTRATE AND ACETAMINOPHEN 1 TABLET: 5; 325 TABLET ORAL at 17:58

## 2023-01-28 RX ADMIN — HYDROCODONE BITARTRATE AND ACETAMINOPHEN 1 TABLET: 5; 325 TABLET ORAL at 01:53

## 2023-01-28 RX ADMIN — SODIUM CHLORIDE, PRESERVATIVE FREE 10 ML: 5 INJECTION INTRAVENOUS at 19:53

## 2023-01-28 RX ADMIN — IOPAMIDOL 75 ML: 755 INJECTION, SOLUTION INTRAVENOUS at 01:29

## 2023-01-28 RX ADMIN — WATER 2000 MG: 1 INJECTION INTRAMUSCULAR; INTRAVENOUS; SUBCUTANEOUS at 02:10

## 2023-01-28 RX ADMIN — HYDROCODONE BITARTRATE AND ACETAMINOPHEN 1 TABLET: 7.5; 325 TABLET ORAL at 04:07

## 2023-01-28 ASSESSMENT — PAIN DESCRIPTION - LOCATION
LOCATION: RIB CAGE

## 2023-01-28 ASSESSMENT — LIFESTYLE VARIABLES
HOW MANY STANDARD DRINKS CONTAINING ALCOHOL DO YOU HAVE ON A TYPICAL DAY: PATIENT DOES NOT DRINK
HOW MANY STANDARD DRINKS CONTAINING ALCOHOL DO YOU HAVE ON A TYPICAL DAY: PATIENT DOES NOT DRINK
HOW OFTEN DO YOU HAVE A DRINK CONTAINING ALCOHOL: NEVER
HOW OFTEN DO YOU HAVE A DRINK CONTAINING ALCOHOL: NEVER

## 2023-01-28 ASSESSMENT — PAIN DESCRIPTION - DESCRIPTORS
DESCRIPTORS: ACHING;SHOOTING
DESCRIPTORS: SHARP
DESCRIPTORS: ACHING;SORE;SHARP

## 2023-01-28 ASSESSMENT — PAIN DESCRIPTION - ORIENTATION
ORIENTATION: RIGHT

## 2023-01-28 ASSESSMENT — PAIN DESCRIPTION - ONSET: ONSET: SUDDEN

## 2023-01-28 ASSESSMENT — PAIN SCALES - GENERAL
PAINLEVEL_OUTOF10: 9
PAINLEVEL_OUTOF10: 0
PAINLEVEL_OUTOF10: 8
PAINLEVEL_OUTOF10: 9
PAINLEVEL_OUTOF10: 3
PAINLEVEL_OUTOF10: 0
PAINLEVEL_OUTOF10: 10
PAINLEVEL_OUTOF10: 0

## 2023-01-28 ASSESSMENT — PAIN DESCRIPTION - PAIN TYPE: TYPE: ACUTE PAIN

## 2023-01-28 ASSESSMENT — PAIN - FUNCTIONAL ASSESSMENT: PAIN_FUNCTIONAL_ASSESSMENT: PREVENTS OR INTERFERES WITH MANY ACTIVE NOT PASSIVE ACTIVITIES

## 2023-01-28 ASSESSMENT — PAIN DESCRIPTION - FREQUENCY: FREQUENCY: INTERMITTENT

## 2023-01-28 NOTE — H&P
[id number] [facility]                  [address]                              HISTORY AND PHYSICAL    PATIENT NAME: Tawana Marshall                   :        1931  MED REC NO:   [id number]                            ROOM:       0407  ACCOUNT NO:   [de-identified]                           ADMIT DATE: 2023  PROVIDER:     Thomas De Leon MD    CHIEF COMPLAINT:  Right-sided pain. HISTORY OF PRESENT ILLNESS:  This is a 19-year-old presented to the  emergency room with apparently a complaint of nausea and some  right-sided rib pain. She denies being aware of any fall and frankly  the patient's ability to give me a reasonable history was quite limited  and that was reduced to using the ER note and her past notes in the  electronic records. The patient's _____ being having pain in her right  side of her chest over her ribs. She reported that this increased with  deep breathing and was tender to touch. She could not seem to recall  whether she had any sort of fall or injury. I did note on exam that she  had a small bruise over her right lateral breast, suggesting that she  might have had a fall somewhere along the line. Nonetheless, x-rays and  CAT scans done in the emergency room did not show any evidence of any  rib fractures or other bony injury. CAT scan did not show any evidence  of any pneumonic infiltrate, mass, or other complications in the chest.   Apparently at some point in the emergency room, she was noted to have a  pulse ox of 85% and that was apparently the reason she was admitted. At  the time of my evaluation, she was on room air with oxygen saturations  that are quite normal.  There is no evidence of any worsening cough,  pneumonia, or any congestive heart failure or any other cause for her to  have hypoxic respiratory failure.   Whatever it was in the emergency  room, seems to have resolved itself and she is no longer requiring any  oxygen therapy. The patient is otherwise lying in bed comfortably and  voicing no complaints except for right lateral chest wall rib pain. PAST MEDICAL HISTORY:  Significant for hypertension and chronic pain,  which is apparently left sciatica in nature and she is on Norco through  pain management. MEDICATIONS:  There is a list of medications that is in the electronic  record that is completely inaccurate. It includes several different  doses of Norco from 5, 7.5, or 10. I do believe she takes one of these  three to four times a day. Gabapentin 100 mg t.i.d. and aspirin 325 mg  daily. Synthroid; there are doses for both 75 and 100 and I do not know  which is the correct dose. vitamin D and there are prescriptions in her  electronic records for both lisinopril and losartan and I do not know  which one she is taking. Nurses state that they will attempt to gather  an accurate medication list, so we will be able to order an appropriate  list of meds when that has been accomplished. PAST SURGICAL HISTORY:  Includes right inguinal hernia and ORIF of a  right hip fracture. SOCIAL HISTORY:  She does not smoke. She does not drink any alcohol. She apparently lives at home on her own. REVIEW OF SYSTEMS:  SKIN:  Reveals no new or changing moles, rashes, lumps, or bumps. She  does have a small bruise noted over her right lateral breast.  LUNGS:  No shortness breath, cough, or wheezing. CARDIOVASCULAR:  No history of angina, MI, or CHF that I can see. No  ongoing chest pain or worsening shortness of breath, orthopnea, or PND. GI:  She apparently had a report of nausea when she arrived at the  emergency room, though she does not have a complaint of nausea at this  time. No abdominal pain. No diarrhea, constipation, melena, or  hematochezia has been reported. :  She does not report any dysuria, hematuria, frequency, or problems  with recurrent UTIs.   MUSCULOSKELETAL:  She does have a history of osteoarthritis, right hip  fracture, and apparently probably chronic left sciatic type pain. NEUROLOGIC:  No history of CVA or TIA symptoms that I could appreciate  on review of her records. PHYSICAL EXAMINATION:  GENERAL:  This is a very elderly woman lying in bed. She appears to be  comfortable. I do not know her baseline mental status, but she did not  significantly participate in history taking or history giving. VITAL SIGNS:  Her vital signs are stable. She is afebrile. Her oxygen  saturation is normal on room air. HEENT:  Head:  Normocephalic and atraumatic. Eyes:  PERRLA. Extraocular movements intact without nystagmus. Throat:  Oral and  buccal mucosa are moist without oral ulcer, exudate, or lesion. NECK:  No goiter, bruits, or lymphadenopathy. CHEST:  Symmetrical excursions with inspiration. She does have some  mild tenderness over her right lateral chest wall. She does have a  small bruise noted over her right lateral breast, suggesting possible  traumatic injury at some point. HEART:  Has a regular rate and rhythm without murmur, rub, gallop, or  JVD. LUNGS:  Clear to auscultation and percussion bilaterally with no  wheezes, rubs, rales, or use of accessory respiratory muscles. ABDOMEN:  Soft and nontender. Positive bowel sounds in all four  quadrants with no hepatosplenomegaly or other masses appreciated. EXTREMITIES:  No clubbing, cyanosis, or edema. 2+ pulses in all  peripheral sites. She appears to have full range of motion of all  extremities. Her gait was unable to be assessed and her strength was  unable to be assessed due to inability to cooperate with exam.    ASSESSMENT AND PLAN:  1. Acute hypoxic respiratory failure. I guess this is the reason why  she was admitted, as she did have a saturation of 85% at some point  during her ER stay. This appears to be resolved without any acute  etiology being determined.   She is presently breathing quite comfortably  with a normal chest x-ray and no fever. No purulent sputum. No  evidence of pulmonary embolism on CT of her chest and at this point no  need for oxygen therapy. 2.  Right rib pain. The patient is tender to palpation over her right  lateral chest wall. She did report some discomfort with deep  inspiration. She does have a small bruise over her right breast,  suggesting that she might have had some sort of traumatic injury. She  denies being aware of any falls or any obvious injury that she is able  to recount. Tylenol will be used for pain. 3.  Hypertension. She does appear to have hypertension. It is unclear  to me whether she is on lisinopril or losartan. We will need to clarify  this and resume her usual blood pressure medications. The patient is 80 years old and appears weak and apparently lives on her own. I am not  confident that she is able to go back home and safely take care of  herself at this point. I will ask PT and OT to evaluate her and we will  consult social work for discussion of discharge planning and possible  subacute rehab.         Juliet Hewitt MD    D: 01/28/2023 9:04:49       T: 01/28/2023 9:09:26     [email or web address]  Job#: [id number]     Doc#: [id number]    CC:

## 2023-01-28 NOTE — ED NOTES
Patient placed on 2L nasal cannula 02 satuaration 85%, educated on shallow breathing. 02 saturation increased to 96% on 2L, patient reported improved comfort. Dr. Poonam Feliciano notified.      Sanjeev Coles RN  01/28/23 [address] [place] [address], RN  01/28/23 0240

## 2023-01-28 NOTE — PROGRESS NOTES
Physical Therapy  Facility/Department: [address] INTERNAL MEDICINE 2  Physical Therapy Initial Assessment  Name: Marissa Harmon  : 1931  MRN: [id number]  Date of Service: 2023    Attempted to see pt for PT evaluation, pt declined secondary to not feeling well.  Lidya Wallace PT [id number]

## 2023-01-28 NOTE — ED PROVIDER NOTES
[address]        Pt Name: Jomar Coronado  MRN: [id number]  [place] 12/19/1931  Date of evaluation: 1/27/2023  Provider: Noble Rey DO  PCP: Rafa Chavez DO  Note Started: 11:45 PM EST 1/27/23    CHIEF COMPLAINT       Chief Complaint   Patient presents with    Nausea    Rib Pain (injury)     Right rib pain, sharp 7/10, intermittent, increasing with inhalation          Fatigue       HISTORY OF PRESENT ILLNESS: 1 or more Elements   History From: Patient    Limitations to history : None    Jomar Coronado is a 80 y.o. female who presents to the emergency department with complaint of nausea, right-sided chest pain, and chills that started today. Patient states her pain is worse with deep inspiration. She is unsure if she fell. She denies any previous similar episodes. Patient has chronic pain from scoliosis and sciatica on her left side. She states she takes hydrocodone and follows with pain management. Her last hydrocodone was 2 hours prior to arrival.  She denies any episodes of emesis. Patient denies any cough or shortness of breath. She is not on any blood thinners. Nursing Notes were all reviewed and agreed with or any disagreements were addressed in the HPI. REVIEW OF EXTERNAL NOTE :           REVIEW OF SYSTEMS :           Positives and Pertinent negatives as per HPI. SURGICAL HISTORY     Past Surgical History:   Procedure Laterality Date    HERNIA REPAIR      HIP FRACTURE SURGERY Right 3/9/2022    RIGHT INTERTROCHANTERIC FEMUR FRACTURE  OPEN REDUCTION INTERNAL FIXATION WITH PHS---SYNTHES performed by Bharti Pena MD at [address]       Current Discharge Medication List        CONTINUE these medications which have NOT CHANGED    Details   HYDROcodone-acetaminophen ([address]) 7.5-325 MG per tablet Take 1 tablet by mouth 4 times daily as needed for Pain. Patient reports she takes 4 a day, everyday. lisinopril (PRINIVIL;ZESTRIL) 5 MG tablet Take 5 mg by mouth daily      cetirizine (ZYRTEC) 10 MG tablet Take 10 mg by mouth 2 times daily      erythromycin (ROMYCIN) 5 MG/GM ophthalmic ointment Place into both eyes nightly 1/4 inch ribbon to eyelids at base of eyelashes at bedtime. pantoprazole (PROTONIX) 40 MG tablet Take 1 tablet by mouth every morning (before breakfast)  Qty: 30 tablet, Refills: 0      levothyroxine (SYNTHROID) 75 MCG tablet Take 75 mcg by mouth Daily             ALLERGIES     Patient has no known allergies. FAMILYHISTORY     History reviewed. No pertinent family history. SOCIAL HISTORY       Social History     Tobacco Use    Smoking status: Former    Smokeless tobacco: Never   Vaping Use    Vaping Use: Never used   Substance Use Topics    Alcohol use: No    Drug use: No       SCREENINGS        Andres Coma Scale  Eye Opening: Spontaneous  Best Verbal Response: Oriented  Best Motor Response: Obeys commands  [place] Coma Scale Score: 15                CIWA Assessment  BP: (!) 146/58  Heart Rate: 85           PHYSICAL EXAM  1 or more Elements     ED Triage Vitals   BP Temp Temp src Pulse Resp SpO2 Height Weight   -- -- -- -- -- -- -- --            Constitutional/General: Alert and oriented x3  Head: Normocephalic and atraumatic  Eyes: PERRL, EOMI, conjunctiva normal, sclera non icteric  ENT:  Oropharynx clear, handling secretions, no trismus, no asymmetry of the posterior oropharynx or uvular edema  Neck: Supple, full ROM, no stridor, no meningeal signs  Respiratory: Bilateral basilar Rales, no wheezing. Not in respiratory distress  Cardiovascular:  Regular rate. Regular rhythm. No murmurs, no gallops, no rubs. 2+ distal pulses. Equal extremity pulses. GI:  Abdomen Soft, Non tender, Non distended. No rebound, guarding, or rigidity. No pulsatile masses. Musculoskeletal: Moves all extremities x 4. Warm and well perfused, no clubbing, no cyanosis, no edema.  Capillary refill <3 seconds. Scoliosis of the spine noted. Tenderness to palpation over the right lower, lateral ribs with no overlying swelling or bruising  Integument: skin warm and dry. No rashes. Neurologic: GCS 15, no focal deficits, symmetric strength 5/5 in the upper and lower extremities bilaterally  Psychiatric: Normal Affect            DIAGNOSTIC RESULTS   LABS:    Labs Reviewed   TROPONIN - Abnormal; Notable for the following components:       Result Value    Troponin, High Sensitivity 17 (*)     All other components within normal limits   CBC WITH AUTO DIFFERENTIAL - Abnormal; Notable for the following components:    WBC 11.6 (*)     Neutrophils % 85.2 (*)     Lymphocytes % 7.4 (*)     Neutrophils Absolute 9.84 (*)     Lymphocytes Absolute 0.86 (*)     Eosinophils Absolute 0.01 (*)     All other components within normal limits   COMPREHENSIVE METABOLIC PANEL - Abnormal; Notable for the following components:    Glucose 121 (*)     All other components within normal limits   TROPONIN - Abnormal; Notable for the following components:    Troponin, High Sensitivity 15 (*)     All other components within normal limits   COVID-19, RAPID   RAPID INFLUENZA A/B ANTIGENS   D-DIMER, QUANTITATIVE       As interpreted by me, the above displayed labs are abnormal. All other labs obtained during this visit were within normal range or not returned as of this dictation. RADIOLOGY:   Non-plain film images such as CT, Ultrasound and MRI are read by the radiologist. Plain radiographic images are visualized and preliminarily interpreted by the ED Provider with the below findings:    Chest x-ray showed bilateral patchy infiltrates    Interpretation per the Radiologist below, if available at the time of this note:    CTA PULMONARY W CONTRAST   Final Result   1. No evidence of pulmonary embolism or acute pulmonary abnormality. 2. Basilar atelectasis and large retrocardiac/left diaphragmatic hernia.       RECOMMENDATIONS:   Careful clinical correlation and follow up recommended. XR CHEST PORTABLE   Final Result   Patchy left mid lung opacity. RECOMMENDATIONS:   Follow-up recommended. No results found. No results found. PROCEDURES   Unless otherwise noted below, none          CRITICAL CARE TIME (.cct)   none    PAST MEDICAL HISTORY/Chronic Conditions Affecting Care      has a past medical history of Hyperlipidemia, Hypertension, Osteoporosis, Spinal stenosis, and Thyroid disease.      EMERGENCY DEPARTMENT COURSE    Vitals:    Vitals:    01/28/23 0822 01/28/23 1533 01/28/23 1535 01/28/23 1758   BP: (!) 131/93 (!) 146/58     Pulse: 82 85     Resp: 18 18  18   Temp: 98.1 °F (36.7 °C) 100 °F (37.8 °C)     TempSrc: Temporal Oral     SpO2: 95% (!) 85% 95%    Weight:       Height:           Patient was given the following medications:  Medications   HYDROcodone-acetaminophen (NORCO) 5-325 MG per tablet 1 tablet (1 tablet Oral Given 1/28/23 1758)   losartan (COZAAR) tablet 100 mg (100 mg Oral Given 1/28/23 1450)   levothyroxine (SYNTHROID) tablet 100 mcg (100 mcg Oral Given 1/28/23 1450)   gabapentin (NEURONTIN) capsule 100 mg (100 mg Oral Given 1/28/23 1953)   sodium chloride flush 0.9 % injection 5-40 mL (10 mLs IntraVENous Given 1/28/23 1953)   sodium chloride flush 0.9 % injection 5-40 mL (has no administration in time range)   0.9 % sodium chloride infusion (has no administration in time range)   enoxaparin Sodium (LOVENOX) injection 30 mg (30 mg SubCUTAneous Not Given 1/28/23 0930)   ondansetron (ZOFRAN-ODT) disintegrating tablet 4 mg (has no administration in time range)     Or   ondansetron (ZOFRAN) injection 4 mg (has no administration in time range)   polyethylene glycol (GLYCOLAX) packet 17 g (has no administration in time range)   acetaminophen (TYLENOL) tablet 650 mg (has no administration in time range)     Or   acetaminophen (TYLENOL) suppository 650 mg (has no administration in time range)   aspirin chewable tablet 81 mg (81 mg Oral Given 1/28/23 2000)   ondansetron (ZOFRAN) injection 4 mg (4 mg IntraVENous Given 1/28/23 0107)   iopamidol (ISOVUE-370) 76 % injection 75 mL (75 mLs IntraVENous Given 1/28/23 0129)   HYDROcodone-acetaminophen (NORCO) 5-325 MG per tablet 1 tablet (1 tablet Oral Given 1/28/23 0153)   cefTRIAXone (ROCEPHIN) 2,000 mg in sterile water 20 mL IV syringe (2,000 mg IntraVENous Given 1/28/23 0210)   doxycycline (VIBRAMYCIN) 100 mg in sodium chloride 0.9 % 100 mL IVPB (0 mg IntraVENous Stopped 1/28/23 0351)   HYDROcodone-acetaminophen ([place]) 7.5-325 MG per tablet 1 tablet (1 tablet Oral Given 1/28/23 0407)           Medical Decision Making/Differential Diagnosis:    CC/HPI Summary, Social Determinants of health, Records Reviewed, DDx, testing done/not done, ED Course, Reassessment, disposition considerations/shared decision making with patient, consults, disposition:      ED Course as of 01/28/23 2018   Sat Jan 28, 2023   0040 EKG read and interpreted by me. Rate 81, normal sinus rhythm, left axis deviation, baseline artifact, no ST elevation or depression, stable compared to previous EKG. [TO]   7743 I spoke with Dr. Nickolas Baum who agrees to accept the patient for admission under Dr. Won Nayak. [TO]      ED Course User Index  [TO] Natanael Contreras, DO        Medical Decision Making  Amount and/or Complexity of Data Reviewed  Labs: ordered. Radiology: ordered. ECG/medicine tests: ordered. Risk  Prescription drug management. Decision regarding hospitalization. This is a 80-year-old female presenting with nausea, fatigue, and right-sided rib pain. Differential includes musculoskeletal pain, ACS, PE, pneumonia among others. Patient was given oral Norco for chronic pain. She was placed on supplemental oxygen after desaturating to 85%. CBC showed a mild leukocytosis. Her troponin was mildly elevated at 15 with a negative delta. COVID and influenza were negative. CMP was unremarkable.   With D-dimer was found to be mildly elevated so a CTA was obtained to rule out PE. No PE was noted, however patient was noted to have bilateral basilar infiltrates concerning for possible pneumonia. Patient was given IV Rocephin and doxycycline for community-acquired pneumonia. Case was discussed with hospitalist who agreed to accept the patient for admission for pneumonia and acute respiratory failure with hypoxia. Patient noted to be hemodynamically stable on reevaluation. CONSULTS: (Who and What was discussed)  IP CONSULT TO INTERNAL MEDICINE  IP CONSULT TO SOCIAL WORK        I am the Primary Clinician of Record. FINAL IMPRESSION      1. Pneumonia due to infectious organism, unspecified laterality, unspecified part of lung    2. Acute respiratory failure with hypoxia (HCC)    3. Nausea          DISPOSITION/PLAN     DISPOSITION Admitted 01/28/2023 02:43:39 AM      PATIENT REFERRED TO:  No follow-up provider specified.     DISCHARGE MEDICATIONS:  Current Discharge Medication List          DISCONTINUED MEDICATIONS:  Current Discharge Medication List        STOP taking these medications       HYDROcodone-acetaminophen (NORCO) 5-325 MG per tablet Comments:   Reason for Stopping:         gabapentin (NEURONTIN) 100 MG capsule Comments:   Reason for Stopping:         Ascorbic Acid (VITAMIN C) 500 MG CAPS Comments:   Reason for Stopping:         oxyCODONE 5 MG capsule Comments:   Reason for Stopping:         losartan (COZAAR) 100 MG tablet Comments:   Reason for Stopping:         aspirin 325 MG EC tablet Comments:   Reason for Stopping:         tiZANidine (ZANAFLEX) 2 MG tablet Comments:   Reason for Stopping:         ondansetron (ZOFRAN) 4 MG tablet Comments:   Reason for Stopping:         albuterol sulfate HFA (VENTOLIN HFA) 108 (90 Base) MCG/ACT inhaler Comments:   Reason for Stopping:         valsartan (DIOVAN) 160 MG tablet Comments:   Reason for Stopping:         amphetamine-dextroamphetamine (ADDERALL XR) 10 MG extended release capsule Comments:   Reason for Stopping:         Coenzyme Q10 (COQ10 PO) Comments:   Reason for Stopping:         B Complex Vitamins (VITAMIN B COMPLEX PO) Comments:   Reason for Stopping:         Cholecalciferol (VITAMIN D3) 25 MCG (1000 UT) CAPS Comments:   Reason for Stopping:                      (Please note that portions of this note were completed with a voice recognition program.  Efforts were made to edit the dictations but occasionally words are mis-transcribed.)    Asim Aldana DO (electronically signed)           Estela Whitman DO  Resident  01/28/23 2018

## 2023-01-28 NOTE — CARE COORDINATION
Social Work discharge planning    SHERLY Dobbs called ANDREW and said Dr Portillo Hercules wants hhc RN PT OT set up for pt's discharge today. Vicky with [facility] said their next available is Thursday 2/2. ANDREW called [facility] [phone] Nelly took  info to have on call call SW back. ANDREW received call back from Jaime Gupta. He said  to fax pt's face sheet, hhc order and H&P. They will be able to accept pt since she her insurance does NOT require auth first. ANDREW notified charge SHERLY Dobbs that pt needs hhc order and then ANDREW will fax referral to Jaime Gupta at [facility] [phone]. ANDREW also asked SHERLY Dobbs to see IF pt will need new home 02 or not. Electronically signed by Grant Ridley on 1/28/2023 at 12:04 PM     Addendum-   SW spoke to Desmond Vargas again about pt declining PT per note because she \"was not feeling well\". Then Jessika Dobbs advised they are not sure Dr is going to discharge pt today, which changed since first contact with RN about patient. RN staff will need to call [facility] once they have hhc order and once discharge date is known. Also will need to address home 02 need or not. Electronically signed by Grant Ridley on 1/28/2023 at 12:21 PM     Addendum-    Chart checked for hhc order for [facility]. SHERLY Dobbs said Dr Portillo Hercules said he would add hhc order. Electronically signed by Grant Ridley on 1/28/2023 at 2:14 PM         The Plan for Transition of Care is related to the following treatment goals: home    The Patient was provided with a choice of provider and agrees   with the discharge plan. [x] Yes [] No    Freedom of choice list was provided with basic dialogue that supports the patient's individualized plan of care/goals, treatment preferences and shares the quality data associated with the providers.  [x] Yes [] No

## 2023-01-29 VITALS
RESPIRATION RATE: 18 BRPM | HEIGHT: 63 IN | BODY MASS INDEX: 17.72 KG/M2 | WEIGHT: 100 LBS | TEMPERATURE: 98.6 F | SYSTOLIC BLOOD PRESSURE: 144 MMHG | HEART RATE: 78 BPM | OXYGEN SATURATION: 93 % | DIASTOLIC BLOOD PRESSURE: 74 MMHG

## 2023-01-29 PROCEDURE — 6360000002 HC RX W HCPCS: Performed by: INTERNAL MEDICINE

## 2023-01-29 PROCEDURE — 6370000000 HC RX 637 (ALT 250 FOR IP): Performed by: INTERNAL MEDICINE

## 2023-01-29 PROCEDURE — 96372 THER/PROPH/DIAG INJ SC/IM: CPT

## 2023-01-29 PROCEDURE — 2700000000 HC OXYGEN THERAPY PER DAY

## 2023-01-29 PROCEDURE — G0378 HOSPITAL OBSERVATION PER HR: HCPCS

## 2023-01-29 RX ADMIN — LEVOTHYROXINE SODIUM 100 MCG: 100 TABLET ORAL at 08:07

## 2023-01-29 RX ADMIN — ENOXAPARIN SODIUM 30 MG: 100 INJECTION SUBCUTANEOUS at 08:06

## 2023-01-29 RX ADMIN — GABAPENTIN 100 MG: 100 CAPSULE ORAL at 08:07

## 2023-01-29 RX ADMIN — LOSARTAN POTASSIUM 100 MG: 50 TABLET, FILM COATED ORAL at 08:06

## 2023-01-29 RX ADMIN — HYDROCODONE BITARTRATE AND ACETAMINOPHEN 1 TABLET: 5; 325 TABLET ORAL at 09:38

## 2023-01-29 ASSESSMENT — PAIN DESCRIPTION - LOCATION: LOCATION: RIB CAGE

## 2023-01-29 ASSESSMENT — PAIN DESCRIPTION - ORIENTATION: ORIENTATION: RIGHT

## 2023-01-29 ASSESSMENT — PAIN DESCRIPTION - DESCRIPTORS: DESCRIPTORS: ACHING

## 2023-01-29 ASSESSMENT — PAIN SCALES - GENERAL: PAINLEVEL_OUTOF10: 7

## 2023-01-29 NOTE — PROGRESS NOTES
Subjective: The patient is awake and alert. No problems overnight. Denies chest pain, angina, and dyspnea. Denies abdominal pain. Tolerating diet. No nausea or vomiting. Does c/o right mid lateral rib pain    Objective:    BP (!) 179/79   Pulse 74   Temp 98.9 °F (37.2 °C) (Axillary)   Resp 18   Ht 5' 3\" (1.6 m)   Wt 100 lb (45.4 kg)   SpO2 92%   BMI 17.71 kg/m²   Neck: No goiter, bruit, or LA  Heart:  RRR, no murmurs, gallops, or rubs.   Lungs:  CTA bilaterally, no wheeze, rales or rhonchi  Abd: bowel sounds present, nontender, nondistended, no masses  Extrem:  No clubbing, cyanosis, or edema, 2+ peripheral pulses, FROM    CBC:   Lab Results   Component Value Date/Time    WBC 11.6 01/28/2023 12:30 AM    RBC 4.19 01/28/2023 12:30 AM    HGB 12.4 01/28/2023 12:30 AM    HCT 38.6 01/28/2023 12:30 AM    MCV 92.1 01/28/2023 12:30 AM    MCH 29.6 01/28/2023 12:30 AM    MCHC 32.1 01/28/2023 12:30 AM    RDW 14.8 01/28/2023 12:30 AM     01/28/2023 12:30 AM    MPV 8.9 01/28/2023 12:30 AM     CMP:    Lab Results   Component Value Date/Time     01/28/2023 12:30 AM    K 4.6 01/28/2023 12:30 AM    K 3.9 09/23/2022 10:57 AM     01/28/2023 12:30 AM    CO2 26 01/28/2023 12:30 AM    BUN 17 01/28/2023 12:30 AM    CREATININE 0.6 01/28/2023 12:30 AM    GFRAA >60 09/23/2022 10:57 AM    LABGLOM >60 01/28/2023 12:30 AM    GLUCOSE 121 01/28/2023 12:30 AM    PROT 7.4 01/28/2023 12:30 AM    LABALBU 4.4 01/28/2023 12:30 AM    CALCIUM 9.9 01/28/2023 12:30 AM    BILITOT 0.4 01/28/2023 12:30 AM    ALKPHOS 52 01/28/2023 12:30 AM    AST 14 01/28/2023 12:30 AM    ALT 10 01/28/2023 12:30 AM          Current Facility-Administered Medications:     HYDROcodone-acetaminophen (NORCO) 5-325 MG per tablet 1 tablet, 1 tablet, Oral, Q8H PRN, Bo Adams MD, 1 tablet at 01/28/23 1758    losartan (COZAAR) tablet 100 mg, 100 mg, Oral, Daily, Bo Adams MD, 100 mg at 01/28/23 1450    levothyroxine (SYNTHROID) tablet 100 mcg, 100 mcg, Oral, Daily, Eulogio Prakash MD, 100 mcg at 01/28/23 1450    gabapentin (NEURONTIN) capsule 100 mg, 100 mg, Oral, TID, Eulogio Prakash MD, 100 mg at 01/28/23 1953    sodium chloride flush 0.9 % injection 5-40 mL, 5-40 mL, IntraVENous, 2 times per day, Eulogio Prakash MD, 10 mL at 01/28/23 1953    sodium chloride flush 0.9 % injection 5-40 mL, 5-40 mL, IntraVENous, PRN, Eulogio Prakash MD    0.9 % sodium chloride infusion, , IntraVENous, PRN, Eulogio Prakash MD    enoxaparin Sodium (LOVENOX) injection 30 mg, 30 mg, SubCUTAneous, Daily, Eulogio Prakash MD    ondansetron (ZOFRAN-ODT) disintegrating tablet 4 mg, 4 mg, Oral, Q8H PRN **OR** ondansetron (ZOFRAN) injection 4 mg, 4 mg, IntraVENous, Q6H PRN, Eulogio Prakash MD    polyethylene glycol (GLYCOLAX) packet 17 g, 17 g, Oral, Daily PRN, Eulogio Prakash MD    acetaminophen (TYLENOL) tablet 650 mg, 650 mg, Oral, Q6H PRN **OR** acetaminophen (TYLENOL) suppository 650 mg, 650 mg, Rectal, Q6H PRN, Eulogio Prakash MD    aspirin chewable tablet 81 mg, 81 mg, Oral, QHS, Eulogio Prakash MD, 81 mg at 01/28/23 2000    Assessment:    Patient Active Problem List   Diagnosis    Near syncope    Other chronic pain    Chest pain    Hypertension    Hyperlipidemia    Nausea    Closed nondisplaced intertrochanteric fracture of right femur (HCC)    Gastroesophageal reflux disease without esophagitis    Primary hypertension    Hypothyroidism    Acute respiratory failure with hypoxia (HCC)    Rib pain       Plan:  Acute hypoxic resp failure- couple SaO2 decreased to 85%. Suspect atelectasis. Add incentive spirometry  Right rib pain- suspect rib injury. No fx.  Pain control  HTN- controlled  D/c home        Eulogio Prakash MD  7:31 AM  1/29/2023

## 2023-01-29 NOTE — PROGRESS NOTES
Pulse ox on room air sitting is 92%  Pulse ox on room air ambulating 87%. Pulse ox on room air ambulating recovery is 95% with deep breathing exercises. Pulse ox on room air sitting recovery 95%. Patient is recovering with deep breathing exercises. [place]

## 2023-01-29 NOTE — CARE COORDINATION
Discharge and [place] [id number] orders are noted. Face sheet, H&P, HHC orders are sent via fax to [facility] [phone]. Daughter at bedside and prepared to transport patient to home. Per nursing notes, O2 recovery adequate on room air. Call placed to [facility] to finalize referral.  Await call back from [place]. Claudia Weber, OSWLADO RN  [facility] Case Management  [phone]  Return call from [place] gwendolyn Whiting who confirmed receipt of referral and has placed patient on schedule for initial visit. Claudia Weber, OSWALDO RN  [facility] Case Management  [phone]

## 2024-06-12 ENCOUNTER — APPOINTMENT (OUTPATIENT)
Dept: CT IMAGING | Age: 89
End: 2024-06-12
Payer: MEDICARE

## 2024-06-12 ENCOUNTER — APPOINTMENT (OUTPATIENT)
Dept: GENERAL RADIOLOGY | Age: 89
End: 2024-06-12
Payer: MEDICARE

## 2024-06-12 ENCOUNTER — HOSPITAL ENCOUNTER (EMERGENCY)
Age: 89
Discharge: HOME | End: 2024-06-12
Attending: STUDENT IN AN ORGANIZED HEALTH CARE EDUCATION/TRAINING PROGRAM
Payer: MEDICARE

## 2024-06-12 VITALS
RESPIRATION RATE: 21 BRPM | HEART RATE: 71 BPM | WEIGHT: 103 LBS | DIASTOLIC BLOOD PRESSURE: 42 MMHG | TEMPERATURE: 97.8 F | BODY MASS INDEX: 18.95 KG/M2 | OXYGEN SATURATION: 100 % | HEIGHT: 62 IN | SYSTOLIC BLOOD PRESSURE: 180 MMHG

## 2024-06-12 DIAGNOSIS — I10 HYPERTENSION, UNSPECIFIED TYPE: Primary | ICD-10-CM

## 2024-06-12 LAB
ALBUMIN SERPL-MCNC: 4.4 G/DL (ref 3.5–5.2)
ALP SERPL-CCNC: 48 U/L (ref 35–104)
ANION GAP SERPL CALCULATED.3IONS-SCNC: 10 MMOL/L (ref 7–16)
AST SERPL-CCNC: 13 U/L (ref 0–31)
BASOPHILS # BLD: 0.05 K/UL (ref 0–0.2)
BASOPHILS NFR BLD: 1 % (ref 0–2)
BILIRUB SERPL-MCNC: 0.4 MG/DL (ref 0–1.2)
BUN SERPL-MCNC: 25 MG/DL (ref 6–23)
CALCIUM SERPL-MCNC: 9.3 MG/DL (ref 8.6–10.2)
CHLORIDE SERPL-SCNC: 105 MMOL/L (ref 98–107)
CO2 SERPL-SCNC: 25 MMOL/L (ref 22–29)
CREAT SERPL-MCNC: 0.6 MG/DL (ref 0.5–1)
EOSINOPHIL # BLD: 0.17 K/UL (ref 0.05–0.5)
EOSINOPHILS RELATIVE PERCENT: 3 % (ref 0–6)
ERYTHROCYTE [DISTWIDTH] IN BLOOD BY AUTOMATED COUNT: 13.4 % (ref 11.5–15)
GFR, ESTIMATED: 85 ML/MIN/1.73M2
GLUCOSE SERPL-MCNC: 96 MG/DL (ref 74–99)
HCT VFR BLD AUTO: 39.7 % (ref 34–48)
HGB BLD-MCNC: 12.6 G/DL (ref 11.5–15.5)
IMM GRANULOCYTES # BLD AUTO: 0.03 K/UL (ref 0–0.58)
LACTATE BLDV-SCNC: 0.8 MMOL/L (ref 0.5–2.2)
LIPASE SERPL-CCNC: 31 U/L (ref 13–60)
LYMPHOCYTES NFR BLD: 1.13 K/UL (ref 1.5–4)
LYMPHOCYTES RELATIVE PERCENT: 17 % (ref 20–42)
MCH RBC QN AUTO: 30.6 PG (ref 26–35)
MCHC RBC AUTO-ENTMCNC: 31.7 G/DL (ref 32–34.5)
MCV RBC AUTO: 96.4 FL (ref 80–99.9)
MONOCYTES NFR BLD: 0.56 K/UL (ref 0.1–0.95)
MONOCYTES NFR BLD: 8 % (ref 2–12)
NEUTROPHILS NFR BLD: 71 % (ref 43–80)
NEUTS SEG NFR BLD: 4.76 K/UL (ref 1.8–7.3)
PLATELET # BLD AUTO: 208 K/UL (ref 130–450)
PMV BLD AUTO: 8.9 FL (ref 7–12)
POTASSIUM SERPL-SCNC: 4 MMOL/L (ref 3.5–5)
PROT SERPL-MCNC: 7.2 G/DL (ref 6.4–8.3)
RBC # BLD AUTO: 4.12 M/UL (ref 3.5–5.5)
SODIUM SERPL-SCNC: 140 MMOL/L (ref 132–146)
TROPONIN I SERPL HS-MCNC: 18 NG/L (ref 0–9)
TROPONIN I SERPL HS-MCNC: 18 NG/L (ref 0–9)
WBC OTHER # BLD: 6.7 K/UL (ref 4.5–11.5)

## 2024-06-12 PROCEDURE — 70450 CT HEAD/BRAIN W/O DYE: CPT

## 2024-06-12 PROCEDURE — 6360000002 HC RX W HCPCS: Performed by: EMERGENCY MEDICINE

## 2024-06-12 PROCEDURE — 83605 ASSAY OF LACTIC ACID: CPT

## 2024-06-12 PROCEDURE — 80053 COMPREHEN METABOLIC PANEL: CPT

## 2024-06-12 PROCEDURE — 71045 X-RAY EXAM CHEST 1 VIEW: CPT

## 2024-06-12 PROCEDURE — 93005 ELECTROCARDIOGRAM TRACING: CPT | Performed by: EMERGENCY MEDICINE

## 2024-06-12 PROCEDURE — 96374 THER/PROPH/DIAG INJ IV PUSH: CPT

## 2024-06-12 PROCEDURE — 99285 EMERGENCY DEPT VISIT HI MDM: CPT

## 2024-06-12 PROCEDURE — 6370000000 HC RX 637 (ALT 250 FOR IP): Performed by: EMERGENCY MEDICINE

## 2024-06-12 PROCEDURE — 83690 ASSAY OF LIPASE: CPT

## 2024-06-12 PROCEDURE — 85025 COMPLETE CBC W/AUTO DIFF WBC: CPT

## 2024-06-12 PROCEDURE — 96375 TX/PRO/DX INJ NEW DRUG ADDON: CPT

## 2024-06-12 PROCEDURE — 84484 ASSAY OF TROPONIN QUANT: CPT

## 2024-06-12 RX ORDER — DIPHENHYDRAMINE HYDROCHLORIDE 50 MG/ML
25 INJECTION INTRAMUSCULAR; INTRAVENOUS ONCE
Status: COMPLETED | OUTPATIENT
Start: 2024-06-12 | End: 2024-06-12

## 2024-06-12 RX ORDER — ACETAMINOPHEN 325 MG/1
650 TABLET ORAL ONCE
Status: COMPLETED | OUTPATIENT
Start: 2024-06-12 | End: 2024-06-12

## 2024-06-12 RX ORDER — PROCHLORPERAZINE EDISYLATE 5 MG/ML
10 INJECTION INTRAMUSCULAR; INTRAVENOUS ONCE
Status: COMPLETED | OUTPATIENT
Start: 2024-06-12 | End: 2024-06-12

## 2024-06-12 RX ADMIN — DIPHENHYDRAMINE HYDROCHLORIDE 25 MG: 50 INJECTION INTRAMUSCULAR; INTRAVENOUS at 11:41

## 2024-06-12 RX ADMIN — PROCHLORPERAZINE EDISYLATE 10 MG: 5 INJECTION INTRAMUSCULAR; INTRAVENOUS at 11:41

## 2024-06-12 RX ADMIN — ACETAMINOPHEN 650 MG: 325 TABLET ORAL at 11:40

## 2024-06-12 ASSESSMENT — LIFESTYLE VARIABLES
HOW MANY STANDARD DRINKS CONTAINING ALCOHOL DO YOU HAVE ON A TYPICAL DAY: PATIENT DOES NOT DRINK
HOW OFTEN DO YOU HAVE A DRINK CONTAINING ALCOHOL: NEVER

## 2024-06-12 ASSESSMENT — PAIN - FUNCTIONAL ASSESSMENT
PAIN_FUNCTIONAL_ASSESSMENT: 0-10
PAIN_FUNCTIONAL_ASSESSMENT: NONE - DENIES PAIN
PAIN_FUNCTIONAL_ASSESSMENT: NONE - DENIES PAIN

## 2024-06-12 ASSESSMENT — PAIN SCALES - GENERAL
PAINLEVEL_OUTOF10: 5
PAINLEVEL_OUTOF10: 0
PAINLEVEL_OUTOF10: 5

## 2024-06-12 ASSESSMENT — PAIN DESCRIPTION - LOCATION
LOCATION: HEAD
LOCATION: HEAD

## 2024-06-12 NOTE — ED PROVIDER NOTES
[facility] EMERGENCY DEPARTMENT  EMERGENCY DEPARTMENT ENCOUNTER        Pt Name: Marissa Harmon  MRN: [id number]  Birthdate 12/19/1931  Date of evaluation: 6/12/2024  Provider: Megan Riggs DO  PCP: Kit Gale DO  Note Started: 9:00 AM EDT 6/12/24    CHIEF COMPLAINT       Chief Complaint   Patient presents with    Hypertension     Per /95; patient states she takes blood pressure medication but gets nervous when it is over 200       HISTORY OF PRESENT ILLNESS: 1 or more Elements   History From: Patient    Limitations to history : None    Marissa Harmon is a 89 y.o. female who presents with concern for high blood pressure.  She notes that over the past 3 days had blood pressure has been getting high to over 200.  She has been taking extra doses of her blood pressure medications for it.  She is not having headaches or vision changes, no chest pain or difficulty breathing, no changes with urination.  She does that she has had diverticulitis in the past and she always has left lower quadrant abdominal pain that has not changed at all from her normal.  No dysuria or hematuria or any other associated complaints.    REVIEW OF SYSTEMS :      Positives and Pertinent negatives as per HPI.     SURGICAL HISTORY     Past Surgical History:   Procedure Laterality Date    HERNIA REPAIR      HIP FRACTURE SURGERY Right 3/9/2022    RIGHT INTERTROCHANTERIC FEMUR FRACTURE  OPEN REDUCTION INTERNAL FIXATION WITH PHS---SYNTHES performed by Joni Patton MD at [facility]       CURRENTMEDICATIONS       Discharge Medication List as of 6/12/2024  1:45 PM        CONTINUE these medications which have NOT CHANGED    Details   HYDROcodone-acetaminophen (NORCO) 7.5-325 MG per tablet Take 1 tablet by mouth 4 times daily as needed for Pain. Patient reports she takes 4 a day, everyday.Historical Med      lisinopril (PRINIVIL;ZESTRIL) 5 MG tablet Take 5 mg by mouth dailyHistorical Med

## 2024-06-13 LAB
EKG ATRIAL RATE: 66 BPM
EKG P AXIS: 71 DEGREES
EKG P-R INTERVAL: 196 MS
EKG Q-T INTERVAL: 388 MS
EKG QRS DURATION: 74 MS
EKG QTC CALCULATION (BAZETT): 406 MS
EKG R AXIS: -20 DEGREES
EKG T AXIS: 41 DEGREES
EKG VENTRICULAR RATE: 66 BPM

## 2024-06-13 PROCEDURE — 93010 ELECTROCARDIOGRAM REPORT: CPT | Performed by: INTERNAL MEDICINE

## (undated) DEVICE — COVER,TABLE,60X90,STERILE: Brand: MEDLINE

## (undated) DEVICE — GUIDEWIRE ORTH L230MM DIA2.5MM S STL SPADE PNT TIP

## (undated) DEVICE — SET ORTHO STD STORTSTD2

## (undated) DEVICE — INTENDED FOR TISSUE SEPARATION, AND OTHER PROCEDURES THAT REQUIRE A SHARP SURGICAL BLADE TO PUNCTURE OR CUT.: Brand: BARD-PARKER ® STAINLESS STEEL BLADES

## (undated) DEVICE — GLOVE ORANGE PI 8   MSG9080

## (undated) DEVICE — PADDING CAST W6INXL4YD COT LO LINTING WYTEX

## (undated) DEVICE — Device

## (undated) DEVICE — FRACTURE TABLE: Brand: MEDLINE INDUSTRIES, INC.

## (undated) DEVICE — GAMMEX® NON-LATEX PI ORTHO SIZE 8, STERILE POLYISOPRENE POWDER-FREE SURGICAL GLOVE: Brand: GAMMEX

## (undated) DEVICE — DRILL SYSTEM 7

## (undated) DEVICE — APPLICATOR PREP 26ML 0.7% IOD POVACRYLEX 74% ISO ALC ST

## (undated) DEVICE — SET ORTHO STD STORTSTD1

## (undated) DEVICE — Z DISCONTINUED PER MEDLINE USE 2741943 DRESSING AQUACEL 10 IN ALG W9XL25CM SIL CVR WTRPRF VIR BACT BARR ANTIMIC